# Patient Record
Sex: FEMALE | Race: WHITE | Employment: UNEMPLOYED | URBAN - METROPOLITAN AREA
[De-identification: names, ages, dates, MRNs, and addresses within clinical notes are randomized per-mention and may not be internally consistent; named-entity substitution may affect disease eponyms.]

---

## 2017-01-03 ENCOUNTER — TELEPHONE (OUTPATIENT)
Dept: CARDIOLOGY CLINIC | Age: 42
End: 2017-01-03

## 2017-01-12 ENCOUNTER — HOSPITAL ENCOUNTER (OUTPATIENT)
Dept: CARDIOLOGY | Facility: CLINIC | Age: 42
Discharge: HOME | End: 2017-01-12
Attending: INTERNAL MEDICINE | Admitting: INTERNAL MEDICINE

## 2017-01-12 LAB
LV EF: 55 %
LVEF MODALITY: NORMAL

## 2017-01-13 ENCOUNTER — TELEPHONE (OUTPATIENT)
Dept: CARDIOLOGY CLINIC | Age: 42
End: 2017-01-13

## 2017-01-16 ENCOUNTER — OFFICE VISIT (OUTPATIENT)
Dept: FAMILY MEDICINE CLINIC | Age: 42
End: 2017-01-16

## 2017-01-16 VITALS
WEIGHT: 239.2 LBS | HEIGHT: 65 IN | TEMPERATURE: 99.2 F | SYSTOLIC BLOOD PRESSURE: 122 MMHG | OXYGEN SATURATION: 97 % | BODY MASS INDEX: 39.85 KG/M2 | DIASTOLIC BLOOD PRESSURE: 80 MMHG | HEART RATE: 80 BPM

## 2017-01-16 DIAGNOSIS — M54.6 ACUTE MIDLINE THORACIC BACK PAIN: ICD-10-CM

## 2017-01-16 DIAGNOSIS — Z01.818 PREOPERATIVE CLEARANCE: Primary | ICD-10-CM

## 2017-01-16 DIAGNOSIS — E66.01 MORBID OBESITY WITH BMI OF 40.0-44.9, ADULT (HCC): ICD-10-CM

## 2017-01-16 PROCEDURE — 99244 OFF/OP CNSLTJ NEW/EST MOD 40: CPT | Performed by: FAMILY MEDICINE

## 2017-01-16 RX ORDER — VORTIOXETINE 10 MG/1
TABLET, FILM COATED ORAL
Refills: 0 | COMMUNITY
Start: 2016-12-02 | End: 2017-05-12 | Stop reason: ALTCHOICE

## 2017-01-16 ASSESSMENT — ENCOUNTER SYMPTOMS
VISUAL CHANGE: 0
SWOLLEN GLANDS: 0
SORE THROAT: 0
ABDOMINAL PAIN: 0

## 2017-01-17 RX ORDER — ALPRAZOLAM 1 MG/1
TABLET ORAL
Qty: 120 TABLET | Refills: 0 | Status: SHIPPED | OUTPATIENT
Start: 2017-01-17 | End: 2017-02-24 | Stop reason: SDUPTHER

## 2017-01-18 RX ORDER — DICYCLOMINE HCL 20 MG
TABLET ORAL
Qty: 180 TABLET | Refills: 3 | Status: SHIPPED | OUTPATIENT
Start: 2017-01-18

## 2017-01-18 RX ORDER — RABEPRAZOLE SODIUM 20 MG/1
TABLET, DELAYED RELEASE ORAL
Qty: 60 TABLET | Refills: 3 | Status: SHIPPED | OUTPATIENT
Start: 2017-01-18 | End: 2018-12-16 | Stop reason: SDUPTHER

## 2017-01-18 RX ORDER — HYDROCHLOROTHIAZIDE 25 MG/1
TABLET ORAL
Qty: 30 TABLET | Refills: 3 | Status: SHIPPED | OUTPATIENT
Start: 2017-01-18 | End: 2018-07-16

## 2017-01-19 ENCOUNTER — TELEPHONE (OUTPATIENT)
Dept: ORTHOPEDIC SURGERY | Age: 42
End: 2017-01-19

## 2017-01-20 ENCOUNTER — HOSPITAL ENCOUNTER (OUTPATIENT)
Dept: OTHER | Age: 42
Discharge: HOME | End: 2017-01-20
Attending: FAMILY MEDICINE | Admitting: FAMILY MEDICINE

## 2017-01-20 DIAGNOSIS — M54.6 ACUTE MIDLINE THORACIC BACK PAIN: ICD-10-CM

## 2017-01-30 ENCOUNTER — HOSPITAL ENCOUNTER (OUTPATIENT)
Dept: SURGERY | Age: 42
Discharge: HOME | End: 2017-01-30
Attending: ORTHOPAEDIC SURGERY | Admitting: ORTHOPAEDIC SURGERY

## 2017-01-30 VITALS
WEIGHT: 239 LBS | RESPIRATION RATE: 20 BRPM | BODY MASS INDEX: 39.82 KG/M2 | TEMPERATURE: 97.8 F | OXYGEN SATURATION: 96 % | HEIGHT: 65 IN | HEART RATE: 66 BPM | SYSTOLIC BLOOD PRESSURE: 119 MMHG | DIASTOLIC BLOOD PRESSURE: 58 MMHG

## 2017-01-30 RX ORDER — MORPHINE SULFATE 10 MG/ML
2 INJECTION, SOLUTION INTRAMUSCULAR; INTRAVENOUS EVERY 5 MIN PRN
Status: DISCONTINUED | OUTPATIENT
Start: 2017-01-30 | End: 2017-01-31 | Stop reason: HOSPADM

## 2017-01-30 RX ORDER — LIDOCAINE HYDROCHLORIDE 10 MG/ML
1 INJECTION, SOLUTION EPIDURAL; INFILTRATION; INTRACAUDAL; PERINEURAL
Status: ACTIVE | OUTPATIENT
Start: 2017-01-30 | End: 2017-01-30

## 2017-01-30 RX ORDER — OXYCODONE HYDROCHLORIDE AND ACETAMINOPHEN 5; 325 MG/1; MG/1
2 TABLET ORAL PRN
Status: ACTIVE | OUTPATIENT
Start: 2017-01-30 | End: 2017-01-30

## 2017-01-30 RX ORDER — OXYCODONE HYDROCHLORIDE AND ACETAMINOPHEN 5; 325 MG/1; MG/1
1 TABLET ORAL PRN
Status: ACTIVE | OUTPATIENT
Start: 2017-01-30 | End: 2017-01-30

## 2017-01-30 RX ORDER — LABETALOL HYDROCHLORIDE 5 MG/ML
5 INJECTION, SOLUTION INTRAVENOUS EVERY 10 MIN PRN
Status: DISCONTINUED | OUTPATIENT
Start: 2017-01-30 | End: 2017-01-31 | Stop reason: HOSPADM

## 2017-01-30 RX ORDER — SODIUM CHLORIDE, SODIUM LACTATE, POTASSIUM CHLORIDE, CALCIUM CHLORIDE 600; 310; 30; 20 MG/100ML; MG/100ML; MG/100ML; MG/100ML
INJECTION, SOLUTION INTRAVENOUS CONTINUOUS
Status: DISCONTINUED | OUTPATIENT
Start: 2017-01-30 | End: 2017-01-31 | Stop reason: HOSPADM

## 2017-01-30 RX ORDER — ONDANSETRON 2 MG/ML
4 INJECTION INTRAMUSCULAR; INTRAVENOUS
Status: ACTIVE | OUTPATIENT
Start: 2017-01-30 | End: 2017-01-30

## 2017-01-30 RX ORDER — SODIUM CHLORIDE 0.9 % (FLUSH) 0.9 %
10 SYRINGE (ML) INJECTION PRN
Status: DISCONTINUED | OUTPATIENT
Start: 2017-01-30 | End: 2017-01-31 | Stop reason: HOSPADM

## 2017-01-30 RX ORDER — MEPERIDINE HYDROCHLORIDE 50 MG/ML
12.5 INJECTION INTRAMUSCULAR; INTRAVENOUS; SUBCUTANEOUS EVERY 5 MIN PRN
Status: DISCONTINUED | OUTPATIENT
Start: 2017-01-30 | End: 2017-01-31 | Stop reason: HOSPADM

## 2017-01-30 RX ORDER — HYDRALAZINE HYDROCHLORIDE 20 MG/ML
5 INJECTION INTRAMUSCULAR; INTRAVENOUS
Status: DISCONTINUED | OUTPATIENT
Start: 2017-01-30 | End: 2017-01-31 | Stop reason: HOSPADM

## 2017-01-30 RX ORDER — MORPHINE SULFATE 2 MG/ML
1 INJECTION, SOLUTION INTRAMUSCULAR; INTRAVENOUS EVERY 5 MIN PRN
Status: DISCONTINUED | OUTPATIENT
Start: 2017-01-30 | End: 2017-01-31 | Stop reason: HOSPADM

## 2017-01-30 RX ORDER — CEPHALEXIN 500 MG/1
500 CAPSULE ORAL 4 TIMES DAILY
Qty: 8 CAPSULE | Refills: 0 | Status: SHIPPED | OUTPATIENT
Start: 2017-01-30 | End: 2017-05-12 | Stop reason: ALTCHOICE

## 2017-01-30 RX ORDER — SODIUM CHLORIDE 0.9 % (FLUSH) 0.9 %
10 SYRINGE (ML) INJECTION EVERY 12 HOURS SCHEDULED
Status: DISCONTINUED | OUTPATIENT
Start: 2017-01-30 | End: 2017-01-31 | Stop reason: HOSPADM

## 2017-01-30 RX ADMIN — SODIUM CHLORIDE, SODIUM LACTATE, POTASSIUM CHLORIDE, CALCIUM CHLORIDE: 600; 310; 30; 20 INJECTION, SOLUTION INTRAVENOUS at 06:52

## 2017-01-30 RX ADMIN — Medication 0.5 MG: at 08:20

## 2017-01-30 RX ADMIN — Medication 0.5 MG: at 08:28

## 2017-01-30 ASSESSMENT — PAIN SCALES - GENERAL
PAINLEVEL_OUTOF10: 3
PAINLEVEL_OUTOF10: 7
PAINLEVEL_OUTOF10: 5
PAINLEVEL_OUTOF10: 6
PAINLEVEL_OUTOF10: 8

## 2017-01-30 ASSESSMENT — PAIN - FUNCTIONAL ASSESSMENT: PAIN_FUNCTIONAL_ASSESSMENT: 0-10

## 2017-01-30 ASSESSMENT — PAIN DESCRIPTION - DESCRIPTORS: DESCRIPTORS: CONSTANT

## 2017-01-31 ENCOUNTER — TELEPHONE (OUTPATIENT)
Dept: ORTHOPEDIC SURGERY | Age: 42
End: 2017-01-31

## 2017-02-01 ENCOUNTER — TELEPHONE (OUTPATIENT)
Dept: ORTHOPEDIC SURGERY | Age: 42
End: 2017-02-01

## 2017-02-03 ENCOUNTER — TELEPHONE (OUTPATIENT)
Dept: BARIATRICS/WEIGHT MGMT | Age: 42
End: 2017-02-03

## 2017-02-10 ENCOUNTER — OFFICE VISIT (OUTPATIENT)
Dept: ORTHOPEDIC SURGERY | Age: 42
End: 2017-02-10

## 2017-02-10 VITALS
DIASTOLIC BLOOD PRESSURE: 73 MMHG | WEIGHT: 239 LBS | HEART RATE: 99 BPM | SYSTOLIC BLOOD PRESSURE: 107 MMHG | HEIGHT: 65 IN | BODY MASS INDEX: 39.82 KG/M2

## 2017-02-10 DIAGNOSIS — G57.61 MORTON'S NEUROMA OF RIGHT FOOT: Primary | ICD-10-CM

## 2017-02-10 PROCEDURE — 99024 POSTOP FOLLOW-UP VISIT: CPT | Performed by: ORTHOPAEDIC SURGERY

## 2017-02-13 DIAGNOSIS — E55.9 VITAMIN D DEFICIENCY: Primary | ICD-10-CM

## 2017-02-13 RX ORDER — DULOXETIN HYDROCHLORIDE 60 MG/1
CAPSULE, DELAYED RELEASE ORAL
Qty: 60 CAPSULE | Refills: 0 | Status: SHIPPED | OUTPATIENT
Start: 2017-02-13 | End: 2019-12-05

## 2017-02-17 ENCOUNTER — TELEPHONE (OUTPATIENT)
Dept: ORTHOPEDIC SURGERY | Age: 42
End: 2017-02-17

## 2017-02-24 ENCOUNTER — OFFICE VISIT (OUTPATIENT)
Dept: ORTHOPEDIC SURGERY | Age: 42
End: 2017-02-24

## 2017-02-24 VITALS
BODY MASS INDEX: 39.82 KG/M2 | HEIGHT: 65 IN | HEART RATE: 71 BPM | WEIGHT: 239 LBS | SYSTOLIC BLOOD PRESSURE: 114 MMHG | DIASTOLIC BLOOD PRESSURE: 72 MMHG

## 2017-02-24 DIAGNOSIS — G57.61 MORTON'S NEUROMA OF RIGHT FOOT: Primary | ICD-10-CM

## 2017-02-24 PROCEDURE — 99024 POSTOP FOLLOW-UP VISIT: CPT | Performed by: ORTHOPAEDIC SURGERY

## 2017-03-14 ENCOUNTER — OFFICE VISIT (OUTPATIENT)
Dept: ORTHOPEDIC SURGERY | Age: 42
End: 2017-03-14

## 2017-03-14 VITALS
SYSTOLIC BLOOD PRESSURE: 108 MMHG | WEIGHT: 238.98 LBS | HEIGHT: 65 IN | DIASTOLIC BLOOD PRESSURE: 69 MMHG | HEART RATE: 102 BPM | BODY MASS INDEX: 39.82 KG/M2

## 2017-03-14 DIAGNOSIS — G57.61 MORTON'S NEUROMA OF RIGHT FOOT: Primary | ICD-10-CM

## 2017-03-14 PROCEDURE — 99024 POSTOP FOLLOW-UP VISIT: CPT | Performed by: ORTHOPAEDIC SURGERY

## 2017-03-28 ENCOUNTER — TELEPHONE (OUTPATIENT)
Dept: BARIATRICS/WEIGHT MGMT | Age: 42
End: 2017-03-28

## 2017-04-05 ENCOUNTER — HOSPITAL ENCOUNTER (OUTPATIENT)
Dept: OTHER | Age: 42
Discharge: HOME | End: 2017-04-05
Attending: PHYSICIAN ASSISTANT | Admitting: PHYSICIAN ASSISTANT

## 2017-04-05 DIAGNOSIS — R19.7 DIARRHEA, UNSPECIFIED TYPE: ICD-10-CM

## 2017-04-05 LAB — C DIFFICILE TOXIN, EIA: ABNORMAL

## 2017-04-06 ENCOUNTER — TELEPHONE (OUTPATIENT)
Dept: FAMILY MEDICINE CLINIC | Age: 42
End: 2017-04-06

## 2017-04-06 DIAGNOSIS — A04.72 C. DIFFICILE COLITIS: Primary | ICD-10-CM

## 2017-04-06 LAB — GI BACTERIAL PATHOGENS BY PCR: NORMAL

## 2017-04-25 ENCOUNTER — OFFICE VISIT (OUTPATIENT)
Dept: ORTHOPEDIC SURGERY | Age: 42
End: 2017-04-25

## 2017-04-25 ENCOUNTER — TELEPHONE (OUTPATIENT)
Dept: BARIATRICS/WEIGHT MGMT | Age: 42
End: 2017-04-25

## 2017-04-25 VITALS
BODY MASS INDEX: 39.82 KG/M2 | SYSTOLIC BLOOD PRESSURE: 96 MMHG | HEIGHT: 65 IN | HEART RATE: 71 BPM | WEIGHT: 238.98 LBS | DIASTOLIC BLOOD PRESSURE: 65 MMHG

## 2017-04-25 DIAGNOSIS — G57.61 MORTON'S NEUROMA OF RIGHT FOOT: Primary | ICD-10-CM

## 2017-04-25 PROCEDURE — 99024 POSTOP FOLLOW-UP VISIT: CPT | Performed by: ORTHOPAEDIC SURGERY

## 2017-05-09 ENCOUNTER — OFFICE VISIT (OUTPATIENT)
Dept: PULMONOLOGY | Age: 42
End: 2017-05-09

## 2017-05-09 VITALS
TEMPERATURE: 97.4 F | SYSTOLIC BLOOD PRESSURE: 97 MMHG | DIASTOLIC BLOOD PRESSURE: 64 MMHG | RESPIRATION RATE: 16 BRPM | HEIGHT: 65 IN | BODY MASS INDEX: 40.35 KG/M2 | OXYGEN SATURATION: 97 % | HEART RATE: 64 BPM | WEIGHT: 242.2 LBS

## 2017-05-09 DIAGNOSIS — R06.83 SNORING: ICD-10-CM

## 2017-05-09 DIAGNOSIS — F17.200 CURRENT SMOKER: ICD-10-CM

## 2017-05-09 DIAGNOSIS — Z01.818 PRE-OPERATIVE CLEARANCE: ICD-10-CM

## 2017-05-09 DIAGNOSIS — J44.9 MODERATE COPD (CHRONIC OBSTRUCTIVE PULMONARY DISEASE) (HCC): ICD-10-CM

## 2017-05-09 DIAGNOSIS — E66.01 MORBID OBESITY, UNSPECIFIED OBESITY TYPE (HCC): ICD-10-CM

## 2017-05-09 DIAGNOSIS — R53.83 OTHER FATIGUE: Primary | ICD-10-CM

## 2017-05-09 PROCEDURE — 99215 OFFICE O/P EST HI 40 MIN: CPT | Performed by: INTERNAL MEDICINE

## 2017-05-09 RX ORDER — ALBUTEROL SULFATE 90 UG/1
2 AEROSOL, METERED RESPIRATORY (INHALATION) EVERY 6 HOURS PRN
Qty: 1 INHALER | Refills: 6 | Status: SHIPPED | OUTPATIENT
Start: 2017-05-09 | End: 2018-07-13 | Stop reason: SDUPTHER

## 2017-05-09 ASSESSMENT — SLEEP AND FATIGUE QUESTIONNAIRES
HOW LIKELY ARE YOU TO NOD OFF OR FALL ASLEEP WHILE SITTING AND TALKING TO SOMEONE: 0
HOW LIKELY ARE YOU TO NOD OFF OR FALL ASLEEP WHEN YOU ARE A PASSENGER IN A CAR FOR AN HOUR WITHOUT A BREAK: 0
NECK CIRCUMFERENCE (INCHES): 14.75
HOW LIKELY ARE YOU TO NOD OFF OR FALL ASLEEP IN A CAR, WHILE STOPPED FOR A FEW MINUTES IN TRAFFIC: 0
HOW LIKELY ARE YOU TO NOD OFF OR FALL ASLEEP WHILE WATCHING TV: 3
HOW LIKELY ARE YOU TO NOD OFF OR FALL ASLEEP WHILE SITTING INACTIVE IN A PUBLIC PLACE: 2
HOW LIKELY ARE YOU TO NOD OFF OR FALL ASLEEP WHILE LYING DOWN TO REST IN THE AFTERNOON WHEN CIRCUMSTANCES PERMIT: 3
HOW LIKELY ARE YOU TO NOD OFF OR FALL ASLEEP WHILE SITTING QUIETLY AFTER LUNCH WITHOUT ALCOHOL: 0
HOW LIKELY ARE YOU TO NOD OFF OR FALL ASLEEP WHILE SITTING AND READING: 0
ESS TOTAL SCORE: 8

## 2017-05-12 ENCOUNTER — HOSPITAL ENCOUNTER (OUTPATIENT)
Dept: ENDOSCOPY | Age: 42
Discharge: HOME | End: 2017-05-12
Attending: SURGERY | Admitting: SURGERY

## 2017-05-12 VITALS
RESPIRATION RATE: 16 BRPM | OXYGEN SATURATION: 97 % | TEMPERATURE: 98 F | WEIGHT: 238.06 LBS | BODY MASS INDEX: 38.26 KG/M2 | HEIGHT: 66 IN | DIASTOLIC BLOOD PRESSURE: 72 MMHG | HEART RATE: 61 BPM | SYSTOLIC BLOOD PRESSURE: 122 MMHG

## 2017-05-12 DIAGNOSIS — K44.9 HIATAL HERNIA: ICD-10-CM

## 2017-05-12 PROCEDURE — 43239 EGD BIOPSY SINGLE/MULTIPLE: CPT | Performed by: SURGERY

## 2017-05-12 RX ORDER — SODIUM CHLORIDE 9 MG/ML
INJECTION, SOLUTION INTRAVENOUS CONTINUOUS
Status: DISCONTINUED | OUTPATIENT
Start: 2017-05-12 | End: 2017-05-13 | Stop reason: HOSPADM

## 2017-05-12 RX ORDER — LIDOCAINE HYDROCHLORIDE 10 MG/ML
1 INJECTION, SOLUTION EPIDURAL; INFILTRATION; INTRACAUDAL; PERINEURAL
Status: ACTIVE | OUTPATIENT
Start: 2017-05-12 | End: 2017-05-12

## 2017-05-12 RX ADMIN — SODIUM CHLORIDE: 9 INJECTION, SOLUTION INTRAVENOUS at 11:09

## 2017-05-12 ASSESSMENT — PAIN - FUNCTIONAL ASSESSMENT: PAIN_FUNCTIONAL_ASSESSMENT: 0-10

## 2017-05-15 ENCOUNTER — HOSPITAL ENCOUNTER (OUTPATIENT)
Dept: PULMONOLOGY | Age: 42
Discharge: HOME | End: 2017-05-15
Attending: INTERNAL MEDICINE | Admitting: INTERNAL MEDICINE

## 2017-05-15 DIAGNOSIS — Z01.818 PRE-OPERATIVE CLEARANCE: ICD-10-CM

## 2017-05-15 DIAGNOSIS — J44.9 CHRONIC OBSTRUCTIVE PULMONARY DISEASE (HCC): ICD-10-CM

## 2017-05-15 DIAGNOSIS — J44.9 CHRONIC OBSTRUCTIVE PULMONARY DISEASE, UNSPECIFIED COPD TYPE (HCC): ICD-10-CM

## 2017-05-15 RX ORDER — ALBUTEROL SULFATE 90 UG/1
6 AEROSOL, METERED RESPIRATORY (INHALATION) ONCE
Status: COMPLETED | OUTPATIENT
Start: 2017-05-15 | End: 2017-05-15

## 2017-05-15 RX ORDER — ALBUTEROL SULFATE 90 UG/1
2 AEROSOL, METERED RESPIRATORY (INHALATION) EVERY 6 HOURS PRN
Status: DISCONTINUED | OUTPATIENT
Start: 2017-05-15 | End: 2017-05-15

## 2017-05-15 RX ADMIN — ALBUTEROL SULFATE 6 PUFF: 90 AEROSOL, METERED RESPIRATORY (INHALATION) at 15:27

## 2017-05-16 ENCOUNTER — TELEPHONE (OUTPATIENT)
Dept: BARIATRICS/WEIGHT MGMT | Age: 42
End: 2017-05-16

## 2017-05-16 PROBLEM — K85.90 ACUTE PANCREATITIS: Status: ACTIVE | Noted: 2017-05-16

## 2017-05-23 ENCOUNTER — OFFICE VISIT (OUTPATIENT)
Dept: BARIATRICS/WEIGHT MGMT | Age: 42
End: 2017-05-23

## 2017-05-23 VITALS
SYSTOLIC BLOOD PRESSURE: 104 MMHG | DIASTOLIC BLOOD PRESSURE: 65 MMHG | BODY MASS INDEX: 40.15 KG/M2 | HEART RATE: 78 BPM | WEIGHT: 241 LBS | HEIGHT: 65 IN | RESPIRATION RATE: 15 BRPM

## 2017-05-23 DIAGNOSIS — K21.9 CHRONIC GERD: ICD-10-CM

## 2017-05-23 DIAGNOSIS — E66.01 MORBID OBESITY WITH BMI OF 40.0-44.9, ADULT (HCC): Primary | ICD-10-CM

## 2017-05-23 DIAGNOSIS — K44.9 HIATAL HERNIA: ICD-10-CM

## 2017-05-23 PROCEDURE — 99213 OFFICE O/P EST LOW 20 MIN: CPT | Performed by: SURGERY

## 2017-05-23 RX ORDER — HYDROCODONE BITARTRATE AND ACETAMINOPHEN 5; 325 MG/1; MG/1
1 TABLET ORAL EVERY 6 HOURS PRN
COMMUNITY
End: 2018-10-01 | Stop reason: ALTCHOICE

## 2017-06-02 ENCOUNTER — HOSPITAL ENCOUNTER (OUTPATIENT)
Dept: SLEEP MEDICINE | Age: 42
Discharge: HOME | End: 2017-06-03
Attending: INTERNAL MEDICINE | Admitting: INTERNAL MEDICINE

## 2017-06-02 DIAGNOSIS — R06.83 SNORING: ICD-10-CM

## 2017-06-02 DIAGNOSIS — R53.83 OTHER FATIGUE: ICD-10-CM

## 2017-06-06 ENCOUNTER — OFFICE VISIT (OUTPATIENT)
Dept: ORTHOPEDIC SURGERY | Age: 42
End: 2017-06-06

## 2017-06-06 VITALS
SYSTOLIC BLOOD PRESSURE: 121 MMHG | DIASTOLIC BLOOD PRESSURE: 75 MMHG | WEIGHT: 234.35 LBS | BODY MASS INDEX: 39.04 KG/M2 | HEIGHT: 65 IN | HEART RATE: 75 BPM

## 2017-06-06 DIAGNOSIS — G57.61 MORTON'S NEUROMA OF RIGHT FOOT: ICD-10-CM

## 2017-06-06 DIAGNOSIS — M79.671 FOOT PAIN, RIGHT: Primary | ICD-10-CM

## 2017-06-06 PROCEDURE — 73630 X-RAY EXAM OF FOOT: CPT | Performed by: ORTHOPAEDIC SURGERY

## 2017-06-06 PROCEDURE — 99212 OFFICE O/P EST SF 10 MIN: CPT | Performed by: ORTHOPAEDIC SURGERY

## 2017-06-06 RX ORDER — DEXAMETHASONE SODIUM PHOSPHATE 4 MG/ML
4 INJECTION, SOLUTION INTRA-ARTICULAR; INTRALESIONAL; INTRAMUSCULAR; INTRAVENOUS; SOFT TISSUE SEE ADMIN INSTRUCTIONS
Qty: 30 ML | Refills: 0 | Status: SHIPPED | OUTPATIENT
Start: 2017-06-06 | End: 2017-08-15 | Stop reason: ALTCHOICE

## 2017-06-13 ENCOUNTER — HOSPITAL ENCOUNTER (OUTPATIENT)
Dept: PHYSICAL THERAPY | Age: 42
Discharge: HOME | End: 2017-06-30
Admitting: ORTHOPAEDIC SURGERY

## 2017-06-16 ENCOUNTER — HOSPITAL ENCOUNTER (OUTPATIENT)
Dept: PHYSICAL THERAPY | Age: 42
Discharge: HOME | End: 2017-06-16
Admitting: ORTHOPAEDIC SURGERY

## 2017-06-19 ENCOUNTER — HOSPITAL ENCOUNTER (OUTPATIENT)
Dept: PHYSICAL THERAPY | Age: 42
Discharge: HOME | End: 2017-06-19
Admitting: ORTHOPAEDIC SURGERY

## 2017-06-21 ENCOUNTER — HOSPITAL ENCOUNTER (OUTPATIENT)
Dept: PHYSICAL THERAPY | Age: 42
Discharge: HOME | End: 2017-06-21
Admitting: ORTHOPAEDIC SURGERY

## 2017-06-23 ENCOUNTER — HOSPITAL ENCOUNTER (OUTPATIENT)
Dept: PHYSICAL THERAPY | Age: 42
Discharge: HOME | End: 2017-06-23
Admitting: ORTHOPAEDIC SURGERY

## 2017-06-30 ENCOUNTER — HOSPITAL ENCOUNTER (OUTPATIENT)
Dept: PHYSICAL THERAPY | Age: 42
Discharge: HOME | End: 2017-06-30
Admitting: ORTHOPAEDIC SURGERY

## 2017-07-07 ENCOUNTER — OFFICE VISIT (OUTPATIENT)
Dept: ORTHOPEDIC SURGERY | Age: 42
End: 2017-07-07

## 2017-07-07 DIAGNOSIS — G57.81 NEUROMA DIGITAL NERVE, RIGHT: Primary | ICD-10-CM

## 2017-07-07 PROCEDURE — 99212 OFFICE O/P EST SF 10 MIN: CPT | Performed by: ORTHOPAEDIC SURGERY

## 2017-07-24 ENCOUNTER — OFFICE VISIT (OUTPATIENT)
Dept: BARIATRICS/WEIGHT MGMT | Age: 42
End: 2017-07-24

## 2017-07-24 VITALS
HEART RATE: 70 BPM | BODY MASS INDEX: 39.22 KG/M2 | WEIGHT: 235.4 LBS | HEIGHT: 65 IN | DIASTOLIC BLOOD PRESSURE: 78 MMHG | SYSTOLIC BLOOD PRESSURE: 112 MMHG

## 2017-07-24 DIAGNOSIS — K21.9 CHRONIC GERD: Primary | ICD-10-CM

## 2017-07-24 DIAGNOSIS — E66.9 OBESITY (BMI 30-39.9): ICD-10-CM

## 2017-07-24 DIAGNOSIS — R06.83 SNORING: ICD-10-CM

## 2017-07-24 PROCEDURE — 99213 OFFICE O/P EST LOW 20 MIN: CPT | Performed by: NURSE PRACTITIONER

## 2017-07-24 RX ORDER — VILAZODONE HYDROCHLORIDE 40 MG/1
TABLET ORAL
Refills: 0 | COMMUNITY
Start: 2017-07-18 | End: 2019-03-07

## 2017-07-24 ASSESSMENT — ENCOUNTER SYMPTOMS
BACK PAIN: 1
GASTROINTESTINAL NEGATIVE: 1
ALLERGIC/IMMUNOLOGIC NEGATIVE: 1
RESPIRATORY NEGATIVE: 1
EYES NEGATIVE: 1

## 2017-07-26 ENCOUNTER — OFFICE VISIT (OUTPATIENT)
Dept: ORTHOPEDIC SURGERY | Age: 42
End: 2017-07-26

## 2017-07-26 VITALS
DIASTOLIC BLOOD PRESSURE: 62 MMHG | HEIGHT: 65 IN | BODY MASS INDEX: 39.23 KG/M2 | WEIGHT: 235.45 LBS | HEART RATE: 67 BPM | SYSTOLIC BLOOD PRESSURE: 110 MMHG

## 2017-07-26 DIAGNOSIS — M25.562 ACUTE PAIN OF BOTH KNEES: Primary | ICD-10-CM

## 2017-07-26 DIAGNOSIS — M22.8X2 PATELLAR MALTRACKING, LEFT: ICD-10-CM

## 2017-07-26 DIAGNOSIS — M22.42 CHONDROMALACIA OF PATELLOFEMORAL JOINT, LEFT: ICD-10-CM

## 2017-07-26 DIAGNOSIS — M22.41 CHONDROMALACIA OF PATELLOFEMORAL JOINT, RIGHT: ICD-10-CM

## 2017-07-26 DIAGNOSIS — M22.8X1 PATELLAR MALTRACKING, RIGHT: ICD-10-CM

## 2017-07-26 DIAGNOSIS — M25.561 ACUTE PAIN OF BOTH KNEES: Primary | ICD-10-CM

## 2017-07-26 PROBLEM — M22.8X9 PATELLAR MALTRACKING: Status: ACTIVE | Noted: 2017-07-26

## 2017-07-26 PROBLEM — M22.40 CHONDROMALACIA OF PATELLOFEMORAL JOINT: Status: ACTIVE | Noted: 2017-07-26

## 2017-07-26 PROCEDURE — 20610 DRAIN/INJ JOINT/BURSA W/O US: CPT | Performed by: ORTHOPAEDIC SURGERY

## 2017-07-26 PROCEDURE — 99213 OFFICE O/P EST LOW 20 MIN: CPT | Performed by: ORTHOPAEDIC SURGERY

## 2017-07-26 PROCEDURE — 73562 X-RAY EXAM OF KNEE 3: CPT | Performed by: ORTHOPAEDIC SURGERY

## 2017-07-28 ENCOUNTER — HOSPITAL ENCOUNTER (OUTPATIENT)
Dept: MRI IMAGING | Age: 42
Discharge: HOME | End: 2017-07-28

## 2017-07-28 DIAGNOSIS — M54.16 LUMBAR RADICULOPATHY: ICD-10-CM

## 2017-07-28 DIAGNOSIS — M79.671 RIGHT FOOT PAIN: ICD-10-CM

## 2017-08-02 ENCOUNTER — HOSPITAL ENCOUNTER (OUTPATIENT)
Dept: PHYSICAL THERAPY | Age: 42
Discharge: HOME | End: 2017-07-31
Admitting: ORTHOPAEDIC SURGERY

## 2017-08-02 ENCOUNTER — HOSPITAL ENCOUNTER (OUTPATIENT)
Dept: PHYSICAL THERAPY | Age: 42
Discharge: HOME | End: 2017-08-02
Admitting: ORTHOPAEDIC SURGERY

## 2017-08-11 ENCOUNTER — HOSPITAL ENCOUNTER (OUTPATIENT)
Dept: PHYSICAL THERAPY | Age: 42
Discharge: HOME | End: 2017-08-11
Admitting: ORTHOPAEDIC SURGERY

## 2017-08-14 ENCOUNTER — HOSPITAL ENCOUNTER (OUTPATIENT)
Dept: PHYSICAL THERAPY | Age: 42
Discharge: HOME | End: 2017-08-14
Admitting: ORTHOPAEDIC SURGERY

## 2017-08-15 ENCOUNTER — OFFICE VISIT (OUTPATIENT)
Dept: PULMONOLOGY | Age: 42
End: 2017-08-15

## 2017-08-15 VITALS
OXYGEN SATURATION: 98 % | DIASTOLIC BLOOD PRESSURE: 72 MMHG | HEIGHT: 65 IN | HEART RATE: 76 BPM | WEIGHT: 234 LBS | BODY MASS INDEX: 38.99 KG/M2 | SYSTOLIC BLOOD PRESSURE: 106 MMHG

## 2017-08-15 DIAGNOSIS — Z01.818 PRE-OPERATIVE CLEARANCE: ICD-10-CM

## 2017-08-15 DIAGNOSIS — J44.9 MODERATE COPD (CHRONIC OBSTRUCTIVE PULMONARY DISEASE) (HCC): ICD-10-CM

## 2017-08-15 DIAGNOSIS — F17.200 CURRENT SMOKER: ICD-10-CM

## 2017-08-15 DIAGNOSIS — R53.83 FATIGUE, UNSPECIFIED TYPE: Primary | ICD-10-CM

## 2017-08-15 DIAGNOSIS — R06.83 SNORING: ICD-10-CM

## 2017-08-15 PROCEDURE — 99214 OFFICE O/P EST MOD 30 MIN: CPT | Performed by: INTERNAL MEDICINE

## 2017-08-15 RX ORDER — ZALEPLON 10 MG/1
10 CAPSULE ORAL ONCE
Qty: 1 CAPSULE | Refills: 0 | Status: SHIPPED | OUTPATIENT
Start: 2017-08-15 | End: 2017-08-15

## 2017-08-15 RX ORDER — NICOTINE 21 MG/24HR
1 PATCH, TRANSDERMAL 24 HOURS TRANSDERMAL EVERY 24 HOURS
Qty: 42 PATCH | Refills: 0 | Status: ON HOLD | OUTPATIENT
Start: 2017-08-15 | End: 2017-12-31 | Stop reason: HOSPADM

## 2017-08-15 RX ORDER — NICOTINE 21 MG/24HR
1 PATCH, TRANSDERMAL 24 HOURS TRANSDERMAL EVERY 24 HOURS
Qty: 14 PATCH | Refills: 0 | Status: SHIPPED | OUTPATIENT
Start: 2017-08-15 | End: 2018-06-05 | Stop reason: ALTCHOICE

## 2017-08-15 ASSESSMENT — SLEEP AND FATIGUE QUESTIONNAIRES
HOW LIKELY ARE YOU TO NOD OFF OR FALL ASLEEP WHILE SITTING QUIETLY AFTER LUNCH WITHOUT ALCOHOL: 3
ESS TOTAL SCORE: 13
HOW LIKELY ARE YOU TO NOD OFF OR FALL ASLEEP IN A CAR, WHILE STOPPED FOR A FEW MINUTES IN TRAFFIC: 0
HOW LIKELY ARE YOU TO NOD OFF OR FALL ASLEEP WHILE SITTING INACTIVE IN A PUBLIC PLACE: 0
NECK CIRCUMFERENCE (INCHES): 15
HOW LIKELY ARE YOU TO NOD OFF OR FALL ASLEEP WHILE WATCHING TV: 1
HOW LIKELY ARE YOU TO NOD OFF OR FALL ASLEEP WHEN YOU ARE A PASSENGER IN A CAR FOR AN HOUR WITHOUT A BREAK: 3
HOW LIKELY ARE YOU TO NOD OFF OR FALL ASLEEP WHILE LYING DOWN TO REST IN THE AFTERNOON WHEN CIRCUMSTANCES PERMIT: 3
HOW LIKELY ARE YOU TO NOD OFF OR FALL ASLEEP WHILE SITTING AND READING: 3
HOW LIKELY ARE YOU TO NOD OFF OR FALL ASLEEP WHILE SITTING AND TALKING TO SOMEONE: 0

## 2017-08-17 ENCOUNTER — HOSPITAL ENCOUNTER (OUTPATIENT)
Dept: PHYSICAL THERAPY | Age: 42
Discharge: HOME | End: 2017-08-17
Admitting: ORTHOPAEDIC SURGERY

## 2017-08-24 ENCOUNTER — OFFICE VISIT (OUTPATIENT)
Dept: ORTHOPEDIC SURGERY | Age: 42
End: 2017-08-24

## 2017-08-24 VITALS
HEART RATE: 69 BPM | HEIGHT: 65 IN | DIASTOLIC BLOOD PRESSURE: 66 MMHG | BODY MASS INDEX: 38.97 KG/M2 | SYSTOLIC BLOOD PRESSURE: 102 MMHG | WEIGHT: 233.91 LBS

## 2017-08-24 DIAGNOSIS — M17.10 LOCALIZED OSTEOARTHROSIS, LOWER LEG: Primary | ICD-10-CM

## 2017-08-24 PROCEDURE — 99213 OFFICE O/P EST LOW 20 MIN: CPT | Performed by: ORTHOPAEDIC SURGERY

## 2017-08-28 ENCOUNTER — HOSPITAL ENCOUNTER (OUTPATIENT)
Dept: MAMMOGRAPHY | Age: 42
Discharge: HOME | End: 2017-08-28
Attending: FAMILY MEDICINE | Admitting: FAMILY MEDICINE

## 2017-08-28 DIAGNOSIS — Z12.31 ENCOUNTER FOR SCREENING MAMMOGRAM FOR BREAST CANCER: ICD-10-CM

## 2017-09-07 ENCOUNTER — HOSPITAL ENCOUNTER (OUTPATIENT)
Dept: PHYSICAL THERAPY | Age: 42
Discharge: HOME | End: 2017-09-30
Admitting: ORTHOPAEDIC SURGERY

## 2017-09-07 NOTE — PROGRESS NOTES
Physical Therapy  Initial Assessment  Date: 2017  Patient Name: Nicole Hart  MRN: [id number]  : 1975             Subjective   General  Chart Reviewed: Yes  Patient assessed for rehabilitation services?: Yes  Additional Pertinent Hx: obesity, FM, anxiety, neuroma with Sx in , 2016, LBP with B radiculopathy, latex allergy. Currently in PT at [facility] for R foot neuroma. Family / Caregiver Present: No  Referring Practitioner: Kadi Munoz MD  Referral Date : 17  Diagnosis: M17.10 localized OA, lower leg (B knee CMP)  General Comment  Comments: PLOF:  chronic B knee and R foot pain  PT Visit Information  Onset Date: 16  PT Insurance Information: [employer] -- 12 of 30 visits used this year, with 2 additional visits scheduled at [facility] next week  Subjective  Subjective: Pt c/o several years of B knee pain with exacerbation last November. Rates pain as 5/10 at best and 9/10 at worst.  States her standing tolerance is 20-30 mins. Not working but is looking into disability. Lives in a quad-level home and tries to avoid using stairs, but is able to amb with reciprocating gt. Had clinic-based PT until a week ago without change in pain.        Objective     Observation/Palpation  Palpation: tender to B patellar ligament and medial patella border    AROM RLE (degrees)  R Hip External Rotation 0-45: 20  R Hip Internal Rotation 0-45: 20  R Knee Flexion 0-145: WNL  R Knee Extension 0: 5  AROM LLE (degrees)  L Hip External Rotation 0-45: 20  L Hip Internal Rotation 0-45: 33  L Knee Flexion 0-145: WNL  L Knee Extension 0: 3    Strength RLE  R Hip Flexion: 4+/5  R Hip ABduction: 4+/5  R Hip Internal Rotation: 5/5  R Hip External Rotation: 5/5  R Knee Flexion: 5/5  R Knee Extension: 5/5  R Ankle Dorsiflexion: 4+/5  R Ankle Plantar flexion: 5/5  Strength LLE  L Hip Flexion: 4+/5  L Hip ABduction: 4+/5  L Hip Internal Rotation: 5/5  L Hip External Rotation: 5/5  L Knee Flexion: 5/5  L Knee Extension: 5/5  L Ankle Dorsiflexion: 5/5  L Ankle Plantar Flexion: 5/5  Strength Other  Other: PGM 4/5 B     Additional Measures  Special Tests: (-) Trell  Other: B genu valgus        Transfers  Sit to Stand: Independent  Stand to sit: Independent  Ambulation  Ambulation?: Yes  Ambulation 1  Device: No Device  Quality of Gait: normal stride length, no limp, no arm swing, femoral ambulator                            Assessment   Conditions Requiring Skilled Therapeutic Intervention  Body structures, Functions, Activity limitations: Decreased functional mobility ; Decreased ADL status; Decreased ROM; Decreased strength  Assessment: chronic B knee pain limiting ADLs, land-based PT without relief. Prognosis: Fair  Decision Making: Low Complexity  REQUIRES PT FOLLOW UP: Yes  Activity Tolerance  Activity Tolerance: Patient Tolerated treatment well         Plan   Plan  Times per week: 2x/wk x4 wks  Current Treatment Recommendations: Strengthening, Gait Training, Home Exercise Program  Plan Comment: aquatic PT    G-Code  PT G-Codes  Functional Assessment Tool Used: LEFS  Score: 12/80 (85% disability)  Functional Limitation: Mobility: Walking and moving around  Mobility: Walking and Moving Around Current Status (): At least 80 percent but less than 100 percent impaired, limited or restricted  Mobility: Walking and Moving Around Goal Status ():  At least 60 percent but less than 80 percent impaired, limited or restricted                                                     Goals  Short term goals  Time Frame for Short term goals: 2 wks  Short term goal 1: Pt will decrease pain by 50% in frequency or intensity  Short term goal 2: Pt will efren 30 mins of aquatic PT without increased pain  Long term goals  Time Frame for Long term goals : 4 wks  Long term goal 1: Pt will decrease pain by % in frequency or intensity  Long term goal 2: Pt will amb with normal gt pattern  Long term goal 3: Pt will increase knee ext to 0 degrees B  Long term goal 4: Pt will increase BLE strength to 5/5 grossly             Felizardo Blood, PT

## 2017-09-07 NOTE — FLOWSHEET NOTE
Goals:    Short term goals  Time Frame for Short term goals: 2 wks  Short term goal 1: Pt will decrease pain by 50% in frequency or intensity  Short term goal 2: Pt will efren 30 mins of aquatic PT without increased pain           Plan: [] Continue per plan of care [] Alter current plan (see comments)   [x] Plan of care initiated [] Hold pending MD visit [] Discharge      Plan for Next Session:  Aquatic PT    Re-Certification Due Date:         Signature:  Freida Fernandez PT

## 2017-09-07 NOTE — FLOWSHEET NOTE
initiated [] Hold pending MD visit [] Discharge    See Weekly Progress Note: [] Yes  [x] No  Next due:

## 2017-09-15 ENCOUNTER — HOSPITAL ENCOUNTER (OUTPATIENT)
Dept: OTHER | Age: 42
Discharge: HOME | End: 2017-09-16
Attending: INTERNAL MEDICINE | Admitting: INTERNAL MEDICINE

## 2017-09-21 ENCOUNTER — OFFICE VISIT (OUTPATIENT)
Dept: BARIATRICS/WEIGHT MGMT | Age: 42
End: 2017-09-21

## 2017-09-21 VITALS
SYSTOLIC BLOOD PRESSURE: 105 MMHG | HEART RATE: 69 BPM | WEIGHT: 239 LBS | RESPIRATION RATE: 16 BRPM | HEIGHT: 65 IN | DIASTOLIC BLOOD PRESSURE: 69 MMHG | BODY MASS INDEX: 39.82 KG/M2

## 2017-09-21 DIAGNOSIS — E66.01 MORBID OBESITY WITH BMI OF 40.0-44.9, ADULT (HCC): Primary | ICD-10-CM

## 2017-09-21 DIAGNOSIS — K44.9 HIATAL HERNIA: ICD-10-CM

## 2017-09-21 DIAGNOSIS — K21.9 CHRONIC GERD: ICD-10-CM

## 2017-09-21 PROCEDURE — 99213 OFFICE O/P EST LOW 20 MIN: CPT | Performed by: SURGERY

## 2017-10-06 ENCOUNTER — TELEPHONE (OUTPATIENT)
Dept: PULMONOLOGY | Age: 42
End: 2017-10-06

## 2017-10-06 DIAGNOSIS — G47.33 OSA (OBSTRUCTIVE SLEEP APNEA): Primary | ICD-10-CM

## 2017-10-26 ENCOUNTER — OFFICE VISIT (OUTPATIENT)
Dept: BARIATRICS/WEIGHT MGMT | Age: 42
End: 2017-10-26

## 2017-10-26 VITALS
BODY MASS INDEX: 40.48 KG/M2 | HEART RATE: 64 BPM | HEIGHT: 65 IN | DIASTOLIC BLOOD PRESSURE: 82 MMHG | WEIGHT: 243 LBS | SYSTOLIC BLOOD PRESSURE: 126 MMHG

## 2017-10-26 DIAGNOSIS — E66.01 MORBID OBESITY WITH BMI OF 40.0-44.9, ADULT (HCC): Primary | ICD-10-CM

## 2017-10-26 DIAGNOSIS — Z71.3 DIETARY COUNSELING AND SURVEILLANCE: ICD-10-CM

## 2017-10-26 PROCEDURE — G8417 CALC BMI ABV UP PARAM F/U: HCPCS | Performed by: FAMILY MEDICINE

## 2017-10-26 PROCEDURE — G8484 FLU IMMUNIZE NO ADMIN: HCPCS | Performed by: FAMILY MEDICINE

## 2017-10-26 PROCEDURE — 99214 OFFICE O/P EST MOD 30 MIN: CPT | Performed by: FAMILY MEDICINE

## 2017-10-26 PROCEDURE — 4004F PT TOBACCO SCREEN RCVD TLK: CPT | Performed by: FAMILY MEDICINE

## 2017-10-26 PROCEDURE — G8427 DOCREV CUR MEDS BY ELIG CLIN: HCPCS | Performed by: FAMILY MEDICINE

## 2017-10-26 RX ORDER — RANITIDINE 300 MG/1
300 TABLET ORAL DAILY
Refills: 3 | COMMUNITY
Start: 2017-10-20 | End: 2021-07-18 | Stop reason: ALTCHOICE

## 2017-10-26 RX ORDER — HYDROCODONE BITARTRATE 40 MG/1
1 TABLET, EXTENDED RELEASE ORAL DAILY
Refills: 0 | COMMUNITY
Start: 2017-09-26 | End: 2019-02-07

## 2017-10-26 NOTE — PROGRESS NOTES
Disorder Father     Depression Father     Other Father      PTSD    Cancer Father      Throat        Review of Systems   HENT: Negative. Eyes: Negative. Respiratory: Negative. Cardiovascular: Negative. Gastrointestinal: Negative. Endocrine: Negative. Musculoskeletal: Negative. Neurological: Negative. Psychiatric/Behavioral: Negative. Physical Exam   Constitutional: She is oriented to person, place, and time. She appears well-developed and well-nourished. Neck: Neck supple. No thyromegaly present. Cardiovascular: Normal rate, regular rhythm and normal heart sounds. Exam reveals no gallop and no friction rub. No murmur heard. Pulmonary/Chest: Effort normal and breath sounds normal. No respiratory distress. She has no wheezes. She has no rales. Abdominal: Soft. There is no tenderness. Musculoskeletal: She exhibits no edema. Lymphadenopathy:     She has no cervical adenopathy. Neurological: She is alert and oriented to person, place, and time. Skin: Skin is warm and dry. Psychiatric: She has a normal mood and affect.  Her behavior is normal.       Admission on 05/16/2017, Discharged on 05/18/2017   Component Date Value Ref Range Status    WBC 05/16/2017 8.2  4.0 - 11.0 K/uL Final    RBC 05/16/2017 4.84  4.00 - 5.20 M/uL Final    Hemoglobin 05/16/2017 12.4  12.0 - 16.0 g/dL Final    Hematocrit 05/16/2017 37.3  36.0 - 48.0 % Final    MCV 05/16/2017 77.0* 80.0 - 100.0 fL Final    MCH 05/16/2017 25.7* 26.0 - 34.0 pg Final    MCHC 05/16/2017 33.4  31.0 - 36.0 g/dL Final    RDW 05/16/2017 17.1* 12.4 - 15.4 % Final    Platelets 39/26/0216 318  135 - 450 K/uL Final    MPV 05/16/2017 8.7  5.0 - 10.5 fL Final    Neutrophils % 05/16/2017 64.9  % Final    Lymphocytes % 05/16/2017 28.6  % Final    Monocytes % 05/16/2017 4.2  % Final    Eosinophils % 05/16/2017 1.8  % Final    Basophils % 05/16/2017 0.5  % Final    Neutrophils # 05/16/2017 5.3  1.7 - 7.7 K/uL Final  Lymphocytes # 05/16/2017 2.3  1.0 - 5.1 K/uL Final    Monocytes # 05/16/2017 0.3  0.0 - 1.3 K/uL Final    Eosinophils # 05/16/2017 0.2  0.0 - 0.6 K/uL Final    Basophils # 05/16/2017 0.0  0.0 - 0.2 K/uL Final    Lipase 05/16/2017 169.0* 13.0 - 60.0 U/L Final    Sodium 05/16/2017 139  136 - 145 mmol/L Final    Potassium 05/16/2017 3.4* 3.5 - 5.1 mmol/L Final    Chloride 05/16/2017 102  99 - 110 mmol/L Final    CO2 05/16/2017 20* 21 - 32 mmol/L Final    Anion Gap 05/16/2017 17* 3 - 16 Final    Glucose 05/16/2017 107* 70 - 99 mg/dL Final    BUN 05/16/2017 7  7 - 20 mg/dL Final    CREATININE 05/16/2017 0.8  0.6 - 1.1 mg/dL Final    GFR Non- 05/16/2017 >60  >60 Final    Comment: >60 mL/min/1.73m2 EGFR, calc. for ages 25 and older using the  MDRD formula (not corrected for weight), is valid for stable  renal function.  GFR  05/16/2017 >60  >60 Final    Comment: Chronic Kidney Disease: less than 60 ml/min/1.73 sq.m. Kidney Failure: less than 15 ml/min/1.73 sq.m. Results valid for patients 18 years and older.       Calcium 05/16/2017 9.2  8.3 - 10.6 mg/dL Final    Total Protein 05/16/2017 7.1  6.4 - 8.2 g/dL Final    Alb 05/16/2017 4.1  3.4 - 5.0 g/dL Final    Albumin/Globulin Ratio 05/16/2017 1.4  1.1 - 2.2 Final    Total Bilirubin 05/16/2017 0.3  0.0 - 1.0 mg/dL Final    Alkaline Phosphatase 05/16/2017 75  40 - 129 U/L Final    ALT 05/16/2017 8* 10 - 40 U/L Final    AST 05/16/2017 10* 15 - 37 U/L Final    Globulin 05/16/2017 3.0  g/dL Final    Color, UA 05/16/2017 Straw  Straw/Yellow Final    Clarity, UA 05/16/2017 Clear  Clear Final    Glucose, Ur 05/16/2017 Negative  Negative mg/dL Final    Bilirubin Urine 05/16/2017 Negative  Negative Final    Ketones, Urine 05/16/2017 Negative  Negative mg/dL Final    Specific Gravity, UA 05/16/2017 <=1.005  1.005 - 1.030 Final    Blood, Urine 05/16/2017 Negative  Negative Final    pH, UA 05/16/2017 6.5 5.0 - 8.0 Final    Protein, UA 05/16/2017 Negative  Negative mg/dL Final    Urobilinogen, Urine 05/16/2017 0.2  <2.0 E.U./dL Final    Nitrite, Urine 05/16/2017 Negative  Negative Final    Leukocyte Esterase, Urine 05/16/2017 Negative  Negative Final    Microscopic Examination 05/16/2017 Not Indicated   Final    Urine Type 05/16/2017 Not Specified   Final    C difficile Toxin, EIA 05/16/2017 Negative for Clostridium difficile antigen and toxinNormal Range: Negative   Final    Ethanol Lvl 05/16/2017 None Detected  mg/dL Final    Comment:    None Detected  Conversion factor:  100 mg/dl = .100 g/dl  For Medical Purposes Only      Sodium 05/17/2017 143  136 - 145 mmol/L Final    Potassium 05/17/2017 3.7  3.5 - 5.1 mmol/L Final    Chloride 05/17/2017 108  99 - 110 mmol/L Final    CO2 05/17/2017 25  21 - 32 mmol/L Final    Anion Gap 05/17/2017 10  3 - 16 Final    Glucose 05/17/2017 87  70 - 99 mg/dL Final    BUN 05/17/2017 6* 7 - 20 mg/dL Final    CREATININE 05/17/2017 0.9  0.6 - 1.1 mg/dL Final    GFR Non- 05/17/2017 >60  >60 Final    Comment: >60 mL/min/1.73m2 EGFR, calc. for ages 25 and older using the  MDRD formula (not corrected for weight), is valid for stable  renal function.  GFR  05/17/2017 >60  >60 Final    Comment: Chronic Kidney Disease: less than 60 ml/min/1.73 sq.m. Kidney Failure: less than 15 ml/min/1.73 sq.m. Results valid for patients 18 years and older.       Calcium 05/17/2017 8.4  8.3 - 10.6 mg/dL Final    Phosphorus 05/17/2017 3.7  2.5 - 4.9 mg/dL Final    Alb 05/17/2017 3.4  3.4 - 5.0 g/dL Final    Magnesium 05/17/2017 1.80  1.80 - 2.40 mg/dL Final    WBC 05/17/2017 5.7  4.0 - 11.0 K/uL Final    RBC 05/17/2017 4.12  4.00 - 5.20 M/uL Final    Hemoglobin 05/17/2017 10.5* 12.0 - 16.0 g/dL Final    Hematocrit 05/17/2017 31.8* 36.0 - 48.0 % Final    MCV 05/17/2017 77.1* 80.0 - 100.0 fL Final    MCH 05/17/2017 25.4* 26.0 - 34.0 pg Final    MCHC 05/17/2017 32.9  31.0 - 36.0 g/dL Final    RDW 05/17/2017 17.5* 12.4 - 15.4 % Final    Platelets 70/08/4238 239  135 - 450 K/uL Final    MPV 05/17/2017 9.1  5.0 - 10.5 fL Final    Neutrophils % 05/17/2017 51.8  % Final    Lymphocytes % 05/17/2017 34.0  % Final    Monocytes % 05/17/2017 7.8  % Final    Eosinophils % 05/17/2017 5.0  % Final    Basophils % 05/17/2017 1.4  % Final    Neutrophils # 05/17/2017 2.9  1.7 - 7.7 K/uL Final    Lymphocytes # 05/17/2017 1.9  1.0 - 5.1 K/uL Final    Monocytes # 05/17/2017 0.4  0.0 - 1.3 K/uL Final    Eosinophils # 05/17/2017 0.3  0.0 - 0.6 K/uL Final    Basophils # 05/17/2017 0.1  0.0 - 0.2 K/uL Final    Total Protein 05/17/2017 5.6* 6.4 - 8.2 g/dL Final    Alkaline Phosphatase 05/17/2017 61  40 - 129 U/L Final    ALT 05/17/2017 <5* 10 - 40 U/L Final    AST 05/17/2017 8* 15 - 37 U/L Final    Total Bilirubin 05/17/2017 0.3  0.0 - 1.0 mg/dL Final    Bilirubin, Direct 05/17/2017 <0.2  0.0 - 0.3 mg/dL Final    Bilirubin, Indirect 05/17/2017 see below  0.0 - 1.0 mg/dL Final    Comment: Indirect Bilirubin cannot be calculated since Total Bilirubin  and/or Direct Bilirubin is below measurable range.  Lipase 05/17/2017 97.0* 13.0 - 60.0 U/L Final    Lactic Acid 05/17/2017 0.7  0.4 - 2.0 mmol/L Final    Magnesium 05/17/2017 1.80  1.80 - 2.40 mg/dL Final    Troponin 05/17/2017 <0.01  <0.01 ng/mL Final    aPTT 05/17/2017 27.0  21.0 - 31.8 sec Final    Comment: Therapeutic range: 41.8 - 80.2 sec    Effective 02-15-17 9:00am EST  Please note reference ranges have  changed for PTT Testing.  Protime 05/17/2017 12.6  9.6 - 13.0 sec Final    Comment: Effective 02-15-17 9:00am EST  Please note reference ranges have  changed for PT and INR Testing.  INR 05/17/2017 1.09  0.85 - 1.15 Final    Comment: Effective 02/15/2017 at 9am EST    Normal: 0.85 - 1.15  Therapeutic: 2.0 - 3.0  Pros.  Valve: 2.5 - 3.5  AMI: 2.0 - 3.0      Color, UA 05/17/2017

## 2017-10-26 NOTE — PATIENT INSTRUCTIONS
Patient Education        Learning About Obesity  What is obesity? Obesity means having so much body fat that your health is in danger. Having too much body fat can lead to type 2 diabetes, heart disease, high blood pressure, arthritis, sleep apnea, and stroke. Even if you don't feel bad now, think about these health risks. Do they seem like a good reason to start on a new path toward a healthier weight? Or do you have another personal, powerful reason for wanting to lose weight? Whatever it is, keep it in mind. It can be hard to change eating habits and exercise habits. But with your own reason and plan, you can do it. How do you know if your weight is in the obesity range? To know if your weight is in the obesity range, your doctor looks at your body mass index (BMI) and waist size. Your BMI is a number that is calculated from your weight and your height. To figure your BMI for yourself, get a BMI table from your doctor or use an online tool, such as http://www.matson.com/ on the [employer]. What causes obesity? When you take in more calories than you burn off, you gain weight. How you eat, how active you are, and other things affect how your body uses calories and whether you gain weight. If you have family members who have too much body fat, you may have inherited a tendency to gain weight. And your family also helps form your eating and lifestyle habits, which can lead to obesity. Also, our busy lives make it harder to plan and cook healthy meals. For many of us, it's easier to reach for prepared foods, go out to eat, or go to the drive-through. But these foods are often high in saturated fat and calories. Portions are often too large. What can you do to reach a healthy weight? Focus on health, not diets. Diets are hard to stay on and don't work in the long run.  It is very hard to stay with a diet that includes lots of big changes in your eating habits. Instead of a diet, focus on lifestyle changes that will improve your health and achieve the right balance of energy and calories. To lose weight, you need to burn more calories than you take in. You can do it by eating healthy foods in reasonable amounts and becoming more active, even a little bit every day. Making small changes over time can add up to a lot. Make a plan for change. Many people have found that naming their reasons for change and staying focused on their plan can make a big difference. Work with your doctor to create a plan that is right for you. · Ask yourself: Ivan Dart are my personal, most powerful reasons for wanting this change? What will my life look like when I've made the change? \"  · Set your long-term goal. Make it specific, such as \"I will lose x pounds. \"  · Break your long-term goal into smaller, short-term goals. Make these small steps specific and within your reach, things you know you can do. These steps are what keep you going from day to day. How can you stay on your plan for change? Be ready. Choose to start during a time when there are few events that might trigger slip-ups, like holidays, social events, and high-stress periods. Decide on your first few steps. Most people have more success when they make small changes, one step at a time. For example, you might switch a daily candy bar to a piece of fruit, walk 10 minutes more, or add more vegetables to a meal.  Line up your support people. Make sure you're not going to be alone as you make this change. Connect with people who understand how important it is to you. Ask family members and friends for help in keeping with your plan. And think about who could make it harder for you, and how to handle them. Try tracking. People who keep track of what they eat, feel, and do are better at losing weight. Try writing down things like:  · What and how much you eat.   · How you feel before and after each

## 2017-10-27 ASSESSMENT — ENCOUNTER SYMPTOMS
RESPIRATORY NEGATIVE: 1
EYES NEGATIVE: 1
GASTROINTESTINAL NEGATIVE: 1

## 2017-11-07 ENCOUNTER — TELEPHONE (OUTPATIENT)
Dept: PULMONOLOGY | Age: 42
End: 2017-11-07

## 2017-11-07 NOTE — TELEPHONE ENCOUNTER
Patient cancelled appointment on 11/14/17 with Dr. Kiera Miguel for sleep f/u. Reason: pt having other health problems (c-diff) and is not able to have sleep study on 11/9/17. Patient did not reschedule appointment. Pt states that she will r/s her sleep study then call back to r/s appt with Dr. Kiera Miguel. Pt is not going to be able to get testing for a while due to current medical problems. Last OV 8/15/17:    Assessment:       · Snoring- negative PSG   · Fatigue   · Morbid obesity   · Moderate COPD   · Preoperative clearance for bariatric surgery   · 28 pack year smoking        Plan:       · Recent negative PSG could be falsely negative given hypersomnia symptoms and family complaints of her snoring. Will repeat PSG  · Sonata 10 mg at time of in lab sleep tetsing- patient is worried might not be able to fall a sleep during the study    · Sleep hygiene  · Avoid sedatives, alcohol and caffeinated drinks at bed time  · No driving motorized vehicles or operating heavy machinery while fatigue, drowsy or sleepy  · Albuterol 2 puffs Q4-6 hrs PRN  · Smoking cessation counseling  · Nicotine patches- declined chantix      Upon examination and discussion I have determined that, from a pulmonary standpoint, patient is clear for surgery. Patient has intermediate preoperative pulmonary risk with 50.8% pulmonary complication rate based on tobacco use, COPD and MARINA. Perioperative pulmonary complications were discussed with the patient including: Atelectasis, infection, prolonged mechanical ventilation, and exacerbation of underlying chronic lung disease.  To minimize the perioperative pulmonary complications I recommend:   · Smoking cessation for 6- 8 weeks before surgery   · Inhaled bronchodilators perioperatively    · Delay elective surgery and antibiotics if respiratory infection present    · Deep breathing exercises or incentive spirometry  · Minimize duration of anesthesia    · Early ambulation and DVT prophylaxis. · Airway extubation (if patient requires intubation for surgery) performed only when the patient is fully awake and alert. · Removal of the endotracheal tube should take place in the operating room or PACU.   · The patient should be maintained in the semiupright or lateral, not supine, position   · Systemic opioids should be used cautiously because of their ability to depress the respiratory drive  · Benzodiazepines should be avoided because of their negative effects on the respiratory control and upper airway musculature.

## 2017-11-29 NOTE — TELEPHONE ENCOUNTER
Spoke to patient offered to reschedule appointment and patient declined. States not feeling well at is time and health issue. Will follow up with patient again in a few weeks if does not reschedule.

## 2017-12-28 PROBLEM — I26.99 ACUTE MASSIVE PULMONARY EMBOLISM (HCC): Status: ACTIVE | Noted: 2017-12-28

## 2017-12-28 PROBLEM — I26.99 PULMONARY EMBOLISM (HCC): Status: ACTIVE | Noted: 2017-12-28

## 2018-01-03 ENCOUNTER — OFFICE VISIT (OUTPATIENT)
Dept: PULMONOLOGY | Age: 43
End: 2018-01-03

## 2018-01-03 VITALS
HEIGHT: 65 IN | WEIGHT: 253 LBS | OXYGEN SATURATION: 92 % | TEMPERATURE: 97.5 F | SYSTOLIC BLOOD PRESSURE: 106 MMHG | DIASTOLIC BLOOD PRESSURE: 68 MMHG | RESPIRATION RATE: 16 BRPM | BODY MASS INDEX: 42.15 KG/M2 | HEART RATE: 75 BPM

## 2018-01-03 DIAGNOSIS — I26.99 OTHER ACUTE PULMONARY EMBOLISM WITHOUT ACUTE COR PULMONALE (HCC): ICD-10-CM

## 2018-01-03 DIAGNOSIS — G47.33 OSA (OBSTRUCTIVE SLEEP APNEA): Primary | ICD-10-CM

## 2018-01-03 DIAGNOSIS — I27.20 PULMONARY HYPERTENSION (HCC): ICD-10-CM

## 2018-01-03 PROCEDURE — G8427 DOCREV CUR MEDS BY ELIG CLIN: HCPCS | Performed by: INTERNAL MEDICINE

## 2018-01-03 PROCEDURE — 1111F DSCHRG MED/CURRENT MED MERGE: CPT | Performed by: INTERNAL MEDICINE

## 2018-01-03 PROCEDURE — 1036F TOBACCO NON-USER: CPT | Performed by: INTERNAL MEDICINE

## 2018-01-03 PROCEDURE — 99214 OFFICE O/P EST MOD 30 MIN: CPT | Performed by: INTERNAL MEDICINE

## 2018-01-03 PROCEDURE — G8417 CALC BMI ABV UP PARAM F/U: HCPCS | Performed by: INTERNAL MEDICINE

## 2018-01-03 PROCEDURE — G8484 FLU IMMUNIZE NO ADMIN: HCPCS | Performed by: INTERNAL MEDICINE

## 2018-01-03 NOTE — PATIENT INSTRUCTIONS
Please keep all of your future appointments scheduled by [facility] Imer GARCIA [facility] Pulmonary office. Sleep Hygiene. .. Tips for better sleep. .. Avoid naps. This will ensure you are sleepy at bedtime. If you have to take a nap, sleep less than 1 hour, before 3 pm.  Sleep only when sleepy; this reduces the time you are awake in bed. Regular exercise is recommended to help you deepen your sleep, but not within 4-6 hours of your bedtime. Timing of exercise is important, aim to exercise early in the morning or early afternoon. A light snack may help you fall asleep. Warm milk and foods high in the amino acid tryptophan, such as bananas, may help you to sleep  Be sure to avoid heavy, spicy or sugary foods 4-6 hours before bedtime and avoid at snack time. Stay away from stimulants such as caffeine and nicotine for at least 4-6 hours before bed. Stimulants can interfere with your ability to fall asleep. Caffeine is found in tea, cola, coffee, cocoa and chocolate and is best avoided at bedtime. Nicotine is found in tobacco products. Avoid alcohol 4-6 hours before bedtime. Alcohol has an immediate sleep-inducing effect, after a few hours when alcohol levels fall there is a stimulant or wake-up effect and will cause fragmented sleep. Sleep rituals are important. Give your body clues it is time to slow down and sleep. Examples include; yoga, deep breathing, listen to relaxing music, a hot bath or a few minutes of reading. Have a fixed bedtime and awakening time, Even on weekends! You will feel better keeping a regular sleep cycle, even if you are retired or not working. Get into your favorite sleep position. If not asleep in 30 minutes, get up and do something boring until you feel sleepy. Remember not to expose yourself to bright lights such as TV, phone or tablet screens. Only use your bed for sleeping. Do not use your bed as an office, workroom or recreation room. Use comfortable bedding.

## 2018-01-03 NOTE — COMMUNICATION BODY
Assessment:       · Mild MARINA   · Moderate COPD   · 28 pack year smoking -quit last week   · Bilateral pulmonary embolism 12/29/2017-Likely provoked by hormonal therapy   · Severe pulmonary hypertension - likely due to acute PE.   Sleep apnea might be contributing        Plan:       · CPAP titration   · Sleep hygiene  · Avoid sedatives, alcohol and caffeinated drinks at bed time  · No driving motorized vehicles or operating heavy machinery while fatigue, drowsy or sleepy  · Albuterol 2 puffs Q4-6 hrs PRN  · Advised to continue with smoking cessation  · Anticoagulation for 3 months- Will need hypercoagulable workup once off Xarelto    · Off hormonal therapy   · Repeat Echo in 3 months- further testing will follow if not improved

## 2018-01-03 NOTE — LETTER
[facility] Pulmonary, Critical Care, and Sleep  [address], [address] [place] [address]  Phone: [phone]  Fax: [phone]    January 3, 2018     Patient: Tj Manuel   MR Number: [id number]   YOB: 1975   Date of Visit: 1/3/2018     Dear Dr. Bogdan Sterling: Thank you for the request for consultation for Loria Lesches to me for the evaluation of sleep apnea. Below are the relevant portions of my assessment and plan of care. Assessment:       · Mild MARINA   · Moderate COPD   · 28 pack year smoking -quit last week   · Bilateral pulmonary embolism 12/29/2017-Likely provoked by hormonal therapy   · Severe pulmonary hypertension - likely due to acute PE. Sleep apnea might be contributing    Plan:       · CPAP titration   · Sleep hygiene  · Avoid sedatives, alcohol and caffeinated drinks at bed time  · No driving motorized vehicles or operating heavy machinery while fatigue, drowsy or sleepy  · Albuterol 2 puffs Q4-6 hrs PRN  · Advised to continue with smoking cessation  · Anticoagulation for 3 months- Will need hypercoagulable workup once off Xarelto    · Off hormonal therapy   · Repeat Echo in 3 months- further testing will follow if not improved     If you have questions, please do not hesitate to call me. I look forward to following Tete Isbell along with you.     Sincerely,      Diana Mcfarlane MD

## 2018-01-03 NOTE — PROGRESS NOTES
extended release capsule, TAKE 1 CAPSULE BY MOUTH TWICE DAILY, Disp: 60 capsule, Rfl: 0    dicyclomine (BENTYL) 20 MG tablet, TAKE 2 TABLETS BY MOUTH THREE TIMES DAILY, Disp: 180 tablet, Rfl: 3    RABEprazole (ACIPHEX) 20 MG tablet, TAKE 2 TABLETS BY MOUTH DAILY (Patient taking differently: TAKE 1 TABLETS BY MOUTH BID), Disp: 60 tablet, Rfl: 3    calcium carbonate (TUMS) 500 MG chewable tablet, Take 2 tablets by mouth as needed Indications: for reflux , Disp: , Rfl:     cetirizine (ZYRTEC) 10 MG tablet, TAKE 1 TABLET BY MOUTH DAILY, Disp: 30 tablet, Rfl: 5    metoprolol tartrate (LOPRESSOR) 50 MG tablet, Take 50 mg by mouth daily, Disp: , Rfl:     hydrOXYzine (ATARAX) 25 MG tablet, Take 25-50 mg by mouth 2 times daily , Disp: , Rfl:     varenicline (CHANTIX STARTING MONTH JOSE) 0.5 MG X 11 & 1 MG X 42 tablet, By mouth., Disp: 1 each, Rfl: 0    scopolamine (TRANSDERM-SCOP) transdermal patch, Place 1 patch onto the skin every 72 hours (Patient taking differently: Place 1 patch onto the skin every 72 hours as needed ), Disp: 4 patch, Rfl: 3    promethazine (PHENERGAN) 25 MG tablet, TAKE 1 TABLET BY MOUTH EVERY 8 HOURS AS NEEDED FOR NAUSEA, Disp: 60 tablet, Rfl: 6    pregabalin (LYRICA) 25 MG capsule, Take 150 mg by mouth 2 times daily , Disp: , Rfl:     baclofen (LIORESAL) 10 MG tablet, Take 20 mg by mouth 3 times daily , Disp: , Rfl:     tamsulosin (FLOMAX) 0.4 MG capsule, Take 0.4 mg by mouth daily, Disp: , Rfl:     topiramate (TOPAMAX) 25 MG tablet, Take 100 mg by mouth 2 times daily , Disp: , Rfl:     diphenhydrAMINE (BENADRYL) 50 MG capsule, Take 50 mg by mouth nightly as needed for Allergies. , Disp: , Rfl:     [START ON 1/21/2018] rivaroxaban (XARELTO) 20 MG TABS tablet, Take 1 tablet by mouth daily (with breakfast), Disp: 30 tablet, Rfl: 3    HYDROcodone-acetaminophen (NORCO) 5-325 MG per tablet, Take 1 tablet by mouth every 6 hours as needed for Pain ., Disp: , Rfl:     hydrochlorothiazide (HYDRODIURIL) 25 MG tablet, TAKE 1 TABLET BY MOUTH DAILY, Disp: 30 tablet, Rfl: 3      Objective:   PHYSICAL EXAM:    Blood pressure 106/68, pulse 75, temperature 97.5 °F (36.4 °C), temperature source Oral, resp. rate 16, height 5' 5\" (1.651 m), weight 253 lb (114.8 kg), last menstrual period 07/31/2011, SpO2 92 %, not currently breastfeeding.' on RA  Gen: No distress. Obese. BMI of 42.10  Eyes: PERRL. No sclera icterus. No conjunctival injection. ENT: No discharge. Pharynx clear. Mallampati class IV. Neck: Trachea midline. No obvious mass. Neck circumference 15\"  Resp: No accessory muscle use. No crackles. No wheezes. No rhonchi. No dullness on percussion. Good air entry. CV: Regular rate. Regular rhythm. No murmur or rub. No edema. GI: Non-tender. Non-distended. No hernia. Skin: Warm and dry. No nodule on exposed extremities. Lymph: No cervical LAD. No supraclavicular LAD. M/S: No cyanosis. No joint deformity. No clubbing. Neuro: Awake. Alert. Moves all four extremities. Psych: Oriented x 3. No anxiety. DATA reviewed by me:   PFTs 05/15/2017 FEV1 1.82(59%) FEV1/FVC 55% TLC 4.94(91%) DLCO 15.55(61%)   PFTs 11/21/2016 FEV1 1.74(57%) FEV1/FVC 51% TLC 4.97(91%) DLCO 16.13(63%)   TSH 1.31     PSG 6/2/2017 AHI 3.5   PSG 9/14/2017 AHI 6.7   CXR 5/15/2017 no acute cardiopulmonary disease       CTPA 12/28/2017  images reviewed by me and showed:   Bilateral pulmonary emboli as detailed above. Mild flattening of the intraventricular septum appears to be present suggesting possible right heart strain. Echo 12/29/17  Left ventricular systolic function is normal with the EF 55%. Systolic and diastolic septal flattening consistent with right ventricular pressure and volume overload. Right ventricle is enlarged and hypokinetic. Right atrial size is mildly dilated . Moderate tricuspid regurgitation.   Systolic pulmonary artery pressure (SPAP) estimated at 64 mmHg consistent  with severe pulmonary hypertension (RA pressure 3 mmHg). Assessment:       · Mild MARINA   · Moderate COPD   · 28 pack year smoking -quit last week   · Bilateral pulmonary embolism 12/29/2017-Likely provoked by hormonal therapy   · Severe pulmonary hypertension - likely due to acute PE.   Sleep apnea might be contributing        Plan:       · CPAP titration   · Sleep hygiene  · Avoid sedatives, alcohol and caffeinated drinks at bed time  · No driving motorized vehicles or operating heavy machinery while fatigue, drowsy or sleepy  · Albuterol 2 puffs Q4-6 hrs PRN  · Advised to continue with smoking cessation  · Anticoagulation for 3 months- Will need hypercoagulable workup once off Xarelto    · Off hormonal therapy   · Repeat Echo in 3 months- further testing will follow if not improved

## 2018-01-17 ENCOUNTER — HOSPITAL ENCOUNTER (OUTPATIENT)
Dept: SLEEP MEDICINE | Age: 43
Discharge: HOME | End: 2018-01-17
Attending: INTERNAL MEDICINE | Admitting: INTERNAL MEDICINE

## 2018-01-17 DIAGNOSIS — G47.33 OSA (OBSTRUCTIVE SLEEP APNEA): ICD-10-CM

## 2018-01-22 DIAGNOSIS — G47.33 OSA (OBSTRUCTIVE SLEEP APNEA): Primary | ICD-10-CM

## 2018-03-22 ENCOUNTER — OFFICE VISIT (OUTPATIENT)
Dept: BARIATRICS/WEIGHT MGMT | Age: 43
End: 2018-03-22

## 2018-03-22 ENCOUNTER — OFFICE VISIT (OUTPATIENT)
Dept: CARDIOLOGY CLINIC | Age: 43
End: 2018-03-22

## 2018-03-22 VITALS
SYSTOLIC BLOOD PRESSURE: 90 MMHG | HEIGHT: 65 IN | DIASTOLIC BLOOD PRESSURE: 60 MMHG | WEIGHT: 243 LBS | HEART RATE: 74 BPM | BODY MASS INDEX: 40.48 KG/M2

## 2018-03-22 VITALS
OXYGEN SATURATION: 97 % | HEART RATE: 68 BPM | SYSTOLIC BLOOD PRESSURE: 86 MMHG | DIASTOLIC BLOOD PRESSURE: 62 MMHG | WEIGHT: 243 LBS | HEIGHT: 65 IN | BODY MASS INDEX: 40.48 KG/M2

## 2018-03-22 DIAGNOSIS — E66.01 MORBID OBESITY WITH BMI OF 40.0-44.9, ADULT (HCC): Primary | ICD-10-CM

## 2018-03-22 DIAGNOSIS — I26.09 OTHER CHRONIC PULMONARY EMBOLISM WITH ACUTE COR PULMONALE (HCC): Primary | ICD-10-CM

## 2018-03-22 DIAGNOSIS — I26.99 ACUTE MASSIVE PULMONARY EMBOLISM (HCC): ICD-10-CM

## 2018-03-22 DIAGNOSIS — I27.82 OTHER CHRONIC PULMONARY EMBOLISM WITH ACUTE COR PULMONALE (HCC): Primary | ICD-10-CM

## 2018-03-22 DIAGNOSIS — G47.33 OSA (OBSTRUCTIVE SLEEP APNEA): ICD-10-CM

## 2018-03-22 DIAGNOSIS — E66.01 MORBID OBESITY (HCC): ICD-10-CM

## 2018-03-22 DIAGNOSIS — I27.20 PULMONARY HYPERTENSION (HCC): ICD-10-CM

## 2018-03-22 PROCEDURE — G8417 CALC BMI ABV UP PARAM F/U: HCPCS | Performed by: INTERNAL MEDICINE

## 2018-03-22 PROCEDURE — 1036F TOBACCO NON-USER: CPT | Performed by: INTERNAL MEDICINE

## 2018-03-22 PROCEDURE — G8427 DOCREV CUR MEDS BY ELIG CLIN: HCPCS | Performed by: NURSE PRACTITIONER

## 2018-03-22 PROCEDURE — 99213 OFFICE O/P EST LOW 20 MIN: CPT | Performed by: NURSE PRACTITIONER

## 2018-03-22 PROCEDURE — G8484 FLU IMMUNIZE NO ADMIN: HCPCS | Performed by: NURSE PRACTITIONER

## 2018-03-22 PROCEDURE — G8484 FLU IMMUNIZE NO ADMIN: HCPCS | Performed by: INTERNAL MEDICINE

## 2018-03-22 PROCEDURE — G8427 DOCREV CUR MEDS BY ELIG CLIN: HCPCS | Performed by: INTERNAL MEDICINE

## 2018-03-22 PROCEDURE — G8417 CALC BMI ABV UP PARAM F/U: HCPCS | Performed by: NURSE PRACTITIONER

## 2018-03-22 PROCEDURE — 99214 OFFICE O/P EST MOD 30 MIN: CPT | Performed by: INTERNAL MEDICINE

## 2018-03-22 PROCEDURE — 1036F TOBACCO NON-USER: CPT | Performed by: NURSE PRACTITIONER

## 2018-03-22 ASSESSMENT — ENCOUNTER SYMPTOMS
RESPIRATORY NEGATIVE: 1
GASTROINTESTINAL NEGATIVE: 1
EYES NEGATIVE: 1

## 2018-03-22 NOTE — PROGRESS NOTES
[address] [facility]   Cardiovascular Evaluation    PATIENT: Pauline Juarez  DATE: 3/22/2018  MRN: [id number]  CSN: [id number]  : 1975      Primary Care Doctor: Pamella Pascal MD  Reason for evaluation:   Follow-Up from Hospital      Subjective:   History of present illness on initial date of evaluation:   Pauline Juarez is a 38 y.o. patient who presents for hospital follow up of PE, right heart strain and severe pulmonary hypertension. She presented to the hospital on 18 with complaints of chest pain. She was found to have bilateral PE's. PAP pressure of 64mmHg suggests some degree of chronic Right heart dysfunction as acute PE would not generate pressures to that degree   Today she reports feeling well overall. She denies chest pain, edema, SOB or syncope. Patient Active Problem List   Diagnosis    Depression with anxiety    SIRENA (generalized anxiety disorder)    Hypothyroidism    Vitamin D deficiency    IBS (irritable bowel syndrome)    Rebound headache    Low back pain radiating to both legs    Low back pain    Hip pain    Insomnia    Arthralgia    C. difficile diarrhea    Abdominal pain, other specified site    Myalgia    Pelvic pain in female    Chronic abdominal pain    Breast nodule    Chronic nausea    Recurrent Clostridium difficile diarrhea    Primary osteoarthritis of right knee    Smoking    Morbid obesity with BMI of 40.0-44.9, adult (HCC)    Chronic GERD    Snoring    Localized osteoarthrosis, lower leg    Neuroma digital nerve    Pre-op exam    Abnormal EKG    Mitchell's neuroma of right foot    Hiatal hernia    Acute pancreatitis    Obesity (BMI 30-39. 9)    Chondromalacia of patellofemoral joint    Patellar maltracking    Pulmonary embolus (HCC)    Acute massive pulmonary embolism (HCC)    Pulmonary hypertension         Past Medical History:   has a past medical history of Abdominal pain, other specified site;  Anemia; Anxiety; Chronic GERD; Chronic nausea; Clostridium difficile diarrhea; Depression; Fibromyalgia; GERD (gastroesophageal reflux disease); Morbid obesity with BMI of 40.0-44.9, adult ([facility] [address] Neuroma digital nerve; PONV (postoperative nausea and vomiting); Primary osteoarthritis of right knee; and Thyroid disease. Surgical History:   has a past surgical history that includes  section; Cholecystectomy; hernia repair; laparoscopy; Hysterectomy; Colonoscopy (13); Dilation and curettage of uterus; other surgical history; Bunionectomy (Right); Cystoscopy; Foot surgery; Hemorrhoid surgery; Foot surgery (Right, 2016); and Upper gastrointestinal endoscopy (2017). Social History:   reports that she has quit smoking. Her smoking use included Cigarettes. She quit after 28.00 years of use. She has never used smokeless tobacco. She reports that she does not drink alcohol or use drugs. Family History:  No evidence for sudden cardiac death or premature CAD    Home Medications:  Reviewed and are listed in nursing record. and/or listed below  Current Outpatient Prescriptions   Medication Sig Dispense Refill    rivaroxaban (XARELTO) 20 MG TABS tablet Take 1 tablet by mouth daily (with breakfast) 30 tablet 3    HYSINGLA ER 40 MG extended release tablet Take 1 tablet by mouth daily . 0    ranitidine (ZANTAC) 300 MG tablet Take 300 mg by mouth daily  3    VILAZODONE HCL 40 MG Tablet TK 1 T PO  QD  0    HYDROcodone-acetaminophen (NORCO) 5-325 MG per tablet Take 1 tablet by mouth every 6 hours as needed for Pain .       albuterol sulfate HFA (VENTOLIN HFA) 108 (90 BASE) MCG/ACT inhaler Inhale 2 puffs into the lungs every 6 hours as needed for Wheezing or Shortness of Breath 1 Inhaler 6    ALPRAZolam (XANAX) 1 MG tablet TAKE 1 TABLET BY MOUTH UP TO FOUR TIMES DAILY AS NEEDED (Patient taking differently: Take 1 tablet bid) 120 tablet 0    ondansetron (ZOFRAN ODT) 4 MG disintegrating tablet Take 1 tablet by mouth every 8 hours as needed for Nausea or Vomiting 15 tablet 0    DULoxetine (CYMBALTA) 60 MG extended release capsule TAKE 1 CAPSULE BY MOUTH TWICE DAILY 60 capsule 0    dicyclomine (BENTYL) 20 MG tablet TAKE 2 TABLETS BY MOUTH THREE TIMES DAILY 180 tablet 3    RABEprazole (ACIPHEX) 20 MG tablet TAKE 2 TABLETS BY MOUTH DAILY (Patient taking differently: TAKE 1 TABLETS BY MOUTH BID) 60 tablet 3    calcium carbonate (TUMS) 500 MG chewable tablet Take 2 tablets by mouth as needed Indications: for reflux       cetirizine (ZYRTEC) 10 MG tablet TAKE 1 TABLET BY MOUTH DAILY 30 tablet 5    metoprolol tartrate (LOPRESSOR) 50 MG tablet Take 50 mg by mouth daily      hydrOXYzine (ATARAX) 25 MG tablet Take 50 mg by mouth 3 times daily       scopolamine (TRANSDERM-SCOP) transdermal patch Place 1 patch onto the skin every 72 hours (Patient taking differently: Place 1 patch onto the skin every 72 hours as needed ) 4 patch 3    promethazine (PHENERGAN) 25 MG tablet TAKE 1 TABLET BY MOUTH EVERY 8 HOURS AS NEEDED FOR NAUSEA 60 tablet 6    pregabalin (LYRICA) 25 MG capsule Take 150 mg by mouth 2 times daily       baclofen (LIORESAL) 10 MG tablet Take 20 mg by mouth 3 times daily       tamsulosin (FLOMAX) 0.4 MG capsule Take 0.4 mg by mouth daily      topiramate (TOPAMAX) 25 MG tablet Take 100 mg by mouth 2 times daily       diphenhydrAMINE (BENADRYL) 50 MG capsule Take 50 mg by mouth nightly as needed for Allergies.  rivaroxaban (XARELTO) 15 MG TABS tablet Take 1 tablet by mouth 2 times daily (with meals) for 20 days 40 tablet 0    nicotine (NICODERM CQ) 14 MG/24HR Place 1 patch onto the skin every 24 hours 14 patch 0    brexpiprazole (REXULTI) 3 MG TABS tablet Take 4 mg by mouth daily       hydrochlorothiazide (HYDRODIURIL) 25 MG tablet TAKE 1 TABLET BY MOUTH DAILY 30 tablet 3    varenicline (CHANTIX STARTING MONTH PAK) 0.5 MG X 11 & 1 MG X 42 tablet By mouth.  1 each 0     No current Results   Component Value Date     2017    K 4.3 2017     2017    CO2 24 2017    BUN 17 2017    CREATININE 1.0 2017    GFRAA >60 2017    GFRAA >60 2013    AGRATIO 1.3 2017    LABGLOM >60 2017    GLUCOSE 91 2017    PROT 7.7 2017    PROT 6.6 2012    CALCIUM 8.4 2017    BILITOT 0.6 2017    ALKPHOS 113 2017    AST 15 2017    ALT 11 2017     PT/INR:   No results found for: PTINR  Liver:  No components found for: CHLPL  Lab Results   Component Value Date    ALT 11 2017    AST 15 2017    ALKPHOS 113 2017    BILITOT 0.6 2017     Lab Results   Component Value Date    LABA1C 5.3 2017     Lipids:         Lab Results   Component Value Date    TRIG 266 (H) 2017    TRIG 161 (H) 2016    TRIG 114 2015            Lab Results   Component Value Date    HDL 38 (L) 2017    HDL 59 2016    HDL 63 (H) 2015            Lab Results   Component Value Date    LDLCALC 136 (H) 2017    LDLCALC 128 (H) 2016    LDLCALC 113 (H) 2015            Lab Results   Component Value Date    LABVLDL 53 2017    LABVLDL 32 2016    LABVLDL 23 2015         CARDIAC DATA   EK2017  Sinus tachycardia   Nonspecific T wave abnormality   Abnormal ECG        Echo 18  Left ventricular systolic function is normal with the EF 55%. Systolic and diastolic septal flattening consistent with right ventricular  pressure and volume overload. Right ventricle is enlarged and hypokinetic. Right atrial size is mildly dilated . Moderate tricuspid regurgitation. Systolic pulmonary artery pressure (SPAP) estimated at 64 mmHg consistent  with severe pulmonary hypertension (RA pressure 3 mmHg).       STRESS TEST:    CARDIAC CATH:    VASCULAR/OTHER IMAGING:      Assessment and Plan   Javier Barber is a 38 y.o. female who presents today for the

## 2018-03-22 NOTE — PATIENT INSTRUCTIONS
Key Low Carb, High Healthy Fat Dietary Points:    - Meats (preferably organic or grass fed) are great sources of protein and are low in carbohydrates. Ahmed [place] with coconut, olive, avocado, or almond oils. - When buying dairy, choose regular or full fat options. - Choose vegetables that grow above ground as they are generally lower in carbohydrates. - Avoid bread, rice, potatoes, pasta and all sources of simple sugars (desserts, soda, breakfast cereals). - Choose beverages that are calorie and sugar free, such as water or flavored sosa. - When eating fruit, choose berries as a snack option a few times a week.

## 2018-03-22 NOTE — PROGRESS NOTES
[Pseudoephedrine Hcl]     Sulfa Antibiotics     Ultram [Tramadol]      Can take percocet , morphine     Influenza Vaccines     Vancomycin Rash         Current Outpatient Prescriptions:     rivaroxaban (XARELTO) 20 MG TABS tablet, Take 1 tablet by mouth daily (with breakfast), Disp: 30 tablet, Rfl: 3    HYSINGLA ER 40 MG extended release tablet, Take 1 tablet by mouth daily . , Disp: , Rfl: 0    ranitidine (ZANTAC) 300 MG tablet, Take 300 mg by mouth daily, Disp: , Rfl: 3    nicotine (NICODERM CQ) 14 MG/24HR, Place 1 patch onto the skin every 24 hours, Disp: 14 patch, Rfl: 0    VILAZODONE HCL 40 MG Tablet, TK 1 T PO  QD, Disp: , Rfl: 0    HYDROcodone-acetaminophen (NORCO) 5-325 MG per tablet, Take 1 tablet by mouth every 6 hours as needed for Pain ., Disp: , Rfl:     brexpiprazole (REXULTI) 3 MG TABS tablet, Take 4 mg by mouth daily , Disp: , Rfl:     albuterol sulfate HFA (VENTOLIN HFA) 108 (90 BASE) MCG/ACT inhaler, Inhale 2 puffs into the lungs every 6 hours as needed for Wheezing or Shortness of Breath, Disp: 1 Inhaler, Rfl: 6    ALPRAZolam (XANAX) 1 MG tablet, TAKE 1 TABLET BY MOUTH UP TO FOUR TIMES DAILY AS NEEDED (Patient taking differently: Take 1 tablet bid), Disp: 120 tablet, Rfl: 0    ondansetron (ZOFRAN ODT) 4 MG disintegrating tablet, Take 1 tablet by mouth every 8 hours as needed for Nausea or Vomiting, Disp: 15 tablet, Rfl: 0    DULoxetine (CYMBALTA) 60 MG extended release capsule, TAKE 1 CAPSULE BY MOUTH TWICE DAILY, Disp: 60 capsule, Rfl: 0    hydrochlorothiazide (HYDRODIURIL) 25 MG tablet, TAKE 1 TABLET BY MOUTH DAILY, Disp: 30 tablet, Rfl: 3    dicyclomine (BENTYL) 20 MG tablet, TAKE 2 TABLETS BY MOUTH THREE TIMES DAILY, Disp: 180 tablet, Rfl: 3    RABEprazole (ACIPHEX) 20 MG tablet, TAKE 2 TABLETS BY MOUTH DAILY (Patient taking differently: TAKE 1 TABLETS BY MOUTH BID), Disp: 60 tablet, Rfl: 3    calcium carbonate (TUMS) 500 MG chewable tablet, Take 2 tablets by mouth as needed Indications: for reflux , Disp: , Rfl:     cetirizine (ZYRTEC) 10 MG tablet, TAKE 1 TABLET BY MOUTH DAILY, Disp: 30 tablet, Rfl: 5    metoprolol tartrate (LOPRESSOR) 50 MG tablet, Take 50 mg by mouth daily, Disp: , Rfl:     hydrOXYzine (ATARAX) 25 MG tablet, Take 50 mg by mouth 3 times daily , Disp: , Rfl:     varenicline (CHANTIX STARTING MONTH PAK) 0.5 MG X 11 & 1 MG X 42 tablet, By mouth., Disp: 1 each, Rfl: 0    scopolamine (TRANSDERM-SCOP) transdermal patch, Place 1 patch onto the skin every 72 hours (Patient taking differently: Place 1 patch onto the skin every 72 hours as needed ), Disp: 4 patch, Rfl: 3    promethazine (PHENERGAN) 25 MG tablet, TAKE 1 TABLET BY MOUTH EVERY 8 HOURS AS NEEDED FOR NAUSEA, Disp: 60 tablet, Rfl: 6    pregabalin (LYRICA) 25 MG capsule, Take 150 mg by mouth 2 times daily , Disp: , Rfl:     baclofen (LIORESAL) 10 MG tablet, Take 20 mg by mouth 3 times daily , Disp: , Rfl:     tamsulosin (FLOMAX) 0.4 MG capsule, Take 0.4 mg by mouth daily, Disp: , Rfl:     topiramate (TOPAMAX) 25 MG tablet, Take 100 mg by mouth 2 times daily , Disp: , Rfl:     diphenhydrAMINE (BENADRYL) 50 MG capsule, Take 50 mg by mouth nightly as needed for Allergies. , Disp: , Rfl:     rivaroxaban (XARELTO) 15 MG TABS tablet, Take 1 tablet by mouth 2 times daily (with meals) for 20 days, Disp: 40 tablet, Rfl: 0    Patient Active Problem List   Diagnosis    Depression with anxiety    SIRENA (generalized anxiety disorder)    Hypothyroidism    Vitamin D deficiency    IBS (irritable bowel syndrome)    Rebound headache    Low back pain radiating to both legs    Low back pain    Hip pain    Insomnia    Arthralgia    C. difficile diarrhea    Abdominal pain, other specified site    Myalgia    Pelvic pain in female    Chronic abdominal pain    Breast nodule    Chronic nausea    Recurrent Clostridium difficile diarrhea    Primary osteoarthritis of right knee    Smoking    Morbid obesity with BMI of 40.0-44.9, adult ([facility] [address]    Chronic GERD    Snoring    Localized osteoarthrosis, lower leg    Neuroma digital nerve    Pre-op exam    Abnormal EKG    Mitchell's neuroma of right foot    Hiatal hernia    Acute pancreatitis    Obesity (BMI 30-39. 9)    Chondromalacia of patellofemoral joint    Patellar maltracking    Pulmonary embolus (HCC)    Acute massive pulmonary embolism (HCC)    Pulmonary hypertension       Review of Systems   Constitutional: Negative. HENT: Negative. Eyes: Negative. Respiratory: Negative. Cardiovascular: Negative. Gastrointestinal: Negative. Skin: Negative. Neurological: Negative. Physical Exam   Constitutional: She is oriented to person, place, and time. She appears well-developed and well-nourished. HENT:   Head: Normocephalic and atraumatic. Eyes: Pupils are equal, round, and reactive to light. Neck: Normal range of motion. Cardiovascular: Normal rate. Pulmonary/Chest: Effort normal.   Musculoskeletal: Normal range of motion. Neurological: She is alert and oriented to person, place, and time. Psychiatric: She has a normal mood and affect.  Her behavior is normal. Judgment and thought content normal.       Admission on 12/28/2017, Discharged on 12/31/2017   Component Date Value Ref Range Status    WBC 12/28/2017 11.6* 4.0 - 11.0 K/uL Final    RBC 12/28/2017 5.19  4.00 - 5.20 M/uL Final    Hemoglobin 12/28/2017 12.6  12.0 - 16.0 g/dL Final    Hematocrit 12/28/2017 40.0  36.0 - 48.0 % Final    MCV 12/28/2017 77.1* 80.0 - 100.0 fL Final    MCH 12/28/2017 24.4* 26.0 - 34.0 pg Final    MCHC 12/28/2017 31.6  31.0 - 36.0 g/dL Final    RDW 12/28/2017 15.8* 12.4 - 15.4 % Final    Platelets 66/46/1153 329  135 - 450 K/uL Final    MPV 12/28/2017 8.5  5.0 - 10.5 fL Final    Neutrophils % 12/28/2017 73.4  % Final    Lymphocytes % 12/28/2017 18.8  % Final    Monocytes % 12/28/2017 5.4  % Final    Eosinophils % 12/28/2017 1.1  % Final    Basophils % 12/28/2017 1.3  % Final    Neutrophils # 12/28/2017 8.5* 1.7 - 7.7 K/uL Final    Lymphocytes # 12/28/2017 2.2  1.0 - 5.1 K/uL Final    Monocytes # 12/28/2017 0.6  0.0 - 1.3 K/uL Final    Eosinophils # 12/28/2017 0.1  0.0 - 0.6 K/uL Final    Basophils # 12/28/2017 0.2  0.0 - 0.2 K/uL Final    Sodium 12/28/2017 141  136 - 145 mmol/L Final    Potassium 12/28/2017 4.1  3.5 - 5.1 mmol/L Final    Chloride 12/28/2017 104  99 - 110 mmol/L Final    CO2 12/28/2017 21  21 - 32 mmol/L Final    Anion Gap 12/28/2017 16  3 - 16 Final    Glucose 12/28/2017 129* 70 - 99 mg/dL Final    BUN 12/28/2017 19  7 - 20 mg/dL Final    CREATININE 12/28/2017 1.2* 0.6 - 1.1 mg/dL Final    GFR Non- 12/28/2017 49* >60 Final    Comment: >60 mL/min/1.73m2 EGFR, calc. for ages 25 and older using the  MDRD formula (not corrected for weight), is valid for stable  renal function.  GFR  12/28/2017 59* >60 Final    Comment: Chronic Kidney Disease: less than 60 ml/min/1.73 sq.m. Kidney Failure: less than 15 ml/min/1.73 sq.m. Results valid for patients 18 years and older.  Calcium 12/28/2017 9.4  8.3 - 10.6 mg/dL Final    Total Protein 12/28/2017 7.7  6.4 - 8.2 g/dL Final    Alb 12/28/2017 4.3  3.4 - 5.0 g/dL Final    Albumin/Globulin Ratio 12/28/2017 1.3  1.1 - 2.2 Final    Total Bilirubin 12/28/2017 0.6  0.0 - 1.0 mg/dL Final    Alkaline Phosphatase 12/28/2017 113  40 - 129 U/L Final    ALT 12/28/2017 11  10 - 40 U/L Final    AST 12/28/2017 15  15 - 37 U/L Final    Globulin 12/28/2017 3.4  g/dL Final    Protime 12/28/2017 13.6* 9.6 - 13.0 sec Final    Comment: Effective 02-15-17 9:00am EST  Please note reference ranges have  changed for PT and INR Testing.  INR 12/28/2017 1.20* 0.85 - 1.15 Final    Comment: Effective 08/01/2017 at 3pm EST    Normal: 0.86 - 1.14  Therapeutic: 2.0 - 3.0  Pros.  Valve: 2.5 - 3.5  AMI: 2.0 - 3.0      Troponin 12/28/2017 0.01  <0.01

## 2018-04-02 ENCOUNTER — HOSPITAL ENCOUNTER (OUTPATIENT)
Dept: NON INVASIVE DIAGNOSTICS | Age: 43
Discharge: HOME | End: 2018-04-02
Attending: INTERNAL MEDICINE | Admitting: INTERNAL MEDICINE

## 2018-04-02 DIAGNOSIS — I26.99 OTHER PULMONARY EMBOLISM WITHOUT ACUTE COR PULMONALE (HCC): ICD-10-CM

## 2018-04-02 LAB
LV EF: 58 %
LVEF MODALITY: NORMAL

## 2018-04-03 ENCOUNTER — TELEPHONE (OUTPATIENT)
Dept: CARDIOLOGY CLINIC | Age: 43
End: 2018-04-03

## 2018-04-24 ENCOUNTER — TELEPHONE (OUTPATIENT)
Dept: PULMONOLOGY | Age: 43
End: 2018-04-24

## 2018-05-03 ENCOUNTER — OFFICE VISIT (OUTPATIENT)
Dept: BARIATRICS/WEIGHT MGMT | Age: 43
End: 2018-05-03

## 2018-05-03 ENCOUNTER — HOSPITAL ENCOUNTER (OUTPATIENT)
Dept: MRI IMAGING | Age: 43
Discharge: HOME | End: 2018-05-03

## 2018-05-03 VITALS
HEART RATE: 68 BPM | RESPIRATION RATE: 16 BRPM | DIASTOLIC BLOOD PRESSURE: 70 MMHG | SYSTOLIC BLOOD PRESSURE: 106 MMHG | HEIGHT: 65 IN | WEIGHT: 249 LBS | BODY MASS INDEX: 41.48 KG/M2

## 2018-05-03 DIAGNOSIS — E66.01 MORBID OBESITY WITH BMI OF 40.0-44.9, ADULT (HCC): Primary | ICD-10-CM

## 2018-05-03 DIAGNOSIS — M54.16 LUMBAR RADICULOPATHY: ICD-10-CM

## 2018-05-03 PROCEDURE — 99213 OFFICE O/P EST LOW 20 MIN: CPT | Performed by: NURSE PRACTITIONER

## 2018-05-03 PROCEDURE — G8427 DOCREV CUR MEDS BY ELIG CLIN: HCPCS | Performed by: NURSE PRACTITIONER

## 2018-05-03 PROCEDURE — G8417 CALC BMI ABV UP PARAM F/U: HCPCS | Performed by: NURSE PRACTITIONER

## 2018-05-03 PROCEDURE — 1036F TOBACCO NON-USER: CPT | Performed by: NURSE PRACTITIONER

## 2018-05-03 RX ORDER — BUSPIRONE HYDROCHLORIDE 5 MG/1
TABLET ORAL
Refills: 0 | COMMUNITY
Start: 2018-04-06 | End: 2019-02-07

## 2018-05-03 ASSESSMENT — ENCOUNTER SYMPTOMS
RESPIRATORY NEGATIVE: 1
EYES NEGATIVE: 1
GASTROINTESTINAL NEGATIVE: 1

## 2018-06-05 ENCOUNTER — OFFICE VISIT (OUTPATIENT)
Dept: PULMONOLOGY | Age: 43
End: 2018-06-05

## 2018-06-05 VITALS
DIASTOLIC BLOOD PRESSURE: 68 MMHG | RESPIRATION RATE: 16 BRPM | WEIGHT: 250 LBS | BODY MASS INDEX: 41.65 KG/M2 | TEMPERATURE: 97.5 F | HEART RATE: 64 BPM | OXYGEN SATURATION: 98 % | SYSTOLIC BLOOD PRESSURE: 96 MMHG | HEIGHT: 65 IN

## 2018-06-05 DIAGNOSIS — I27.20 PULMONARY HTN (HCC): ICD-10-CM

## 2018-06-05 DIAGNOSIS — G47.33 MILD OBSTRUCTIVE SLEEP APNEA: Primary | ICD-10-CM

## 2018-06-05 DIAGNOSIS — J44.9 MODERATE COPD (CHRONIC OBSTRUCTIVE PULMONARY DISEASE) (HCC): ICD-10-CM

## 2018-06-05 DIAGNOSIS — R60.0 LEG EDEMA: ICD-10-CM

## 2018-06-05 DIAGNOSIS — Z78.9 INTOLERANCE OF CONTINUOUS POSITIVE AIRWAY PRESSURE (CPAP) VENTILATION: ICD-10-CM

## 2018-06-05 PROCEDURE — G8427 DOCREV CUR MEDS BY ELIG CLIN: HCPCS | Performed by: INTERNAL MEDICINE

## 2018-06-05 PROCEDURE — 1036F TOBACCO NON-USER: CPT | Performed by: INTERNAL MEDICINE

## 2018-06-05 PROCEDURE — 99214 OFFICE O/P EST MOD 30 MIN: CPT | Performed by: INTERNAL MEDICINE

## 2018-06-05 PROCEDURE — G8926 SPIRO NO PERF OR DOC: HCPCS | Performed by: INTERNAL MEDICINE

## 2018-06-05 PROCEDURE — G8417 CALC BMI ABV UP PARAM F/U: HCPCS | Performed by: INTERNAL MEDICINE

## 2018-06-05 PROCEDURE — 3023F SPIROM DOC REV: CPT | Performed by: INTERNAL MEDICINE

## 2018-06-05 ASSESSMENT — SLEEP AND FATIGUE QUESTIONNAIRES
HOW LIKELY ARE YOU TO NOD OFF OR FALL ASLEEP WHILE WATCHING TV: 0
HOW LIKELY ARE YOU TO NOD OFF OR FALL ASLEEP WHILE SITTING AND TALKING TO SOMEONE: 0
HOW LIKELY ARE YOU TO NOD OFF OR FALL ASLEEP WHILE SITTING AND READING: 3
HOW LIKELY ARE YOU TO NOD OFF OR FALL ASLEEP WHEN YOU ARE A PASSENGER IN A CAR FOR AN HOUR WITHOUT A BREAK: 0
HOW LIKELY ARE YOU TO NOD OFF OR FALL ASLEEP WHILE SITTING QUIETLY AFTER LUNCH WITHOUT ALCOHOL: 0
HOW LIKELY ARE YOU TO NOD OFF OR FALL ASLEEP WHILE SITTING INACTIVE IN A PUBLIC PLACE: 0
HOW LIKELY ARE YOU TO NOD OFF OR FALL ASLEEP IN A CAR, WHILE STOPPED FOR A FEW MINUTES IN TRAFFIC: 0
ESS TOTAL SCORE: 6
HOW LIKELY ARE YOU TO NOD OFF OR FALL ASLEEP WHILE LYING DOWN TO REST IN THE AFTERNOON WHEN CIRCUMSTANCES PERMIT: 3
NECK CIRCUMFERENCE (INCHES): 14.75

## 2018-06-06 ENCOUNTER — HOSPITAL ENCOUNTER (OUTPATIENT)
Dept: VASCULAR LAB | Age: 43
Discharge: HOME | End: 2018-06-06
Attending: INTERNAL MEDICINE | Admitting: INTERNAL MEDICINE

## 2018-06-06 ENCOUNTER — TELEPHONE (OUTPATIENT)
Dept: PULMONOLOGY | Age: 43
End: 2018-06-06

## 2018-06-06 DIAGNOSIS — R60.0 LOCALIZED EDEMA: ICD-10-CM

## 2018-06-26 ENCOUNTER — TELEPHONE (OUTPATIENT)
Dept: ORTHOPEDIC SURGERY | Age: 43
End: 2018-06-26

## 2018-07-12 ENCOUNTER — HOSPITAL ENCOUNTER (OUTPATIENT)
Dept: SLEEP MEDICINE | Age: 43
Discharge: HOME | End: 2018-07-13
Attending: INTERNAL MEDICINE | Admitting: INTERNAL MEDICINE

## 2018-07-12 DIAGNOSIS — Z78.9 INTOLERANCE OF CONTINUOUS POSITIVE AIRWAY PRESSURE (CPAP) VENTILATION: ICD-10-CM

## 2018-07-12 DIAGNOSIS — G47.33 MILD OBSTRUCTIVE SLEEP APNEA: ICD-10-CM

## 2018-07-13 ENCOUNTER — TELEPHONE (OUTPATIENT)
Dept: PULMONOLOGY | Age: 43
End: 2018-07-13

## 2018-07-13 NOTE — TELEPHONE ENCOUNTER
Patient called stating that during sleep study 7/12/18 she  was placed on a Bipap machine and now her lungs hurt. Denies SOB, Wheezing, or any other symptoms. Please advise. Assessment: 6/5/18      · Mild MARINA. CPAP 10 cmH2O. Poor compliance upon review today  · Intolerance to CPAP    · Led edema with mild calf tenderness- rule out DVT and Xolair failure  · Moderate COPD   · 28 pack year smoking - 12/2018  · Bilateral pulmonary embolism 12/29/2017-Likely provoked by hormonal therapy   · Severe pulmonary hypertension - likely due to acute PE. Sleep apnea might be contributing.  Much improved on repeat - normal PAP          Plan:       · Full night BiPAP titration   · Lower extremity Doppler rule out DVT  · Sleep hygiene  · Avoid sedatives, alcohol and caffeinated drinks at bed time  · No driving motorized vehicles or operating heavy machinery while fatigue, drowsy or sleepy  · Albuterol 2 puffs Q4-6 hrs PRN  · Advised to continue with smoking cessation  · Anticoagulation per PCP- consider hypercoagulable workup once off Xarelto    · Off hormonal therapy

## 2018-07-13 NOTE — TELEPHONE ENCOUNTER
Patient returned call and was informed of  message. Patient did not understand how the Bipap did not cause the pain in her lungs, since it started after the Bipap was put on, but Patient did say that she would go to ER with worsening symptoms.

## 2018-07-16 ENCOUNTER — OFFICE VISIT (OUTPATIENT)
Dept: FAMILY MEDICINE CLINIC | Age: 43
End: 2018-07-16

## 2018-07-16 VITALS
BODY MASS INDEX: 41.42 KG/M2 | DIASTOLIC BLOOD PRESSURE: 62 MMHG | OXYGEN SATURATION: 98 % | WEIGHT: 248.6 LBS | HEIGHT: 65 IN | SYSTOLIC BLOOD PRESSURE: 116 MMHG | HEART RATE: 58 BPM

## 2018-07-16 DIAGNOSIS — I26.99 OTHER ACUTE PULMONARY EMBOLISM WITHOUT ACUTE COR PULMONALE (HCC): ICD-10-CM

## 2018-07-16 DIAGNOSIS — G43.009 MIGRAINE WITHOUT AURA AND WITHOUT STATUS MIGRAINOSUS, NOT INTRACTABLE: ICD-10-CM

## 2018-07-16 DIAGNOSIS — G89.4 CHRONIC PAIN SYNDROME: ICD-10-CM

## 2018-07-16 DIAGNOSIS — E03.4 HYPOTHYROIDISM DUE TO ACQUIRED ATROPHY OF THYROID: Primary | ICD-10-CM

## 2018-07-16 DIAGNOSIS — F41.8 DEPRESSION WITH ANXIETY: ICD-10-CM

## 2018-07-16 DIAGNOSIS — M25.50 ARTHRALGIA, UNSPECIFIED JOINT: ICD-10-CM

## 2018-07-16 PROBLEM — G47.33 OSA TREATED WITH BIPAP: Status: ACTIVE | Noted: 2018-07-16

## 2018-07-16 PROBLEM — E66.9 OBESITY (BMI 30-39.9): Status: RESOLVED | Noted: 2017-07-24 | Resolved: 2018-07-16

## 2018-07-16 PROCEDURE — G8427 DOCREV CUR MEDS BY ELIG CLIN: HCPCS | Performed by: FAMILY MEDICINE

## 2018-07-16 PROCEDURE — 99203 OFFICE O/P NEW LOW 30 MIN: CPT | Performed by: FAMILY MEDICINE

## 2018-07-16 PROCEDURE — 36415 COLL VENOUS BLD VENIPUNCTURE: CPT | Performed by: FAMILY MEDICINE

## 2018-07-16 PROCEDURE — G8417 CALC BMI ABV UP PARAM F/U: HCPCS | Performed by: FAMILY MEDICINE

## 2018-07-16 PROCEDURE — 1036F TOBACCO NON-USER: CPT | Performed by: FAMILY MEDICINE

## 2018-07-16 RX ORDER — MEMANTINE HYDROCHLORIDE 10 MG/1
10 TABLET ORAL DAILY
COMMUNITY
Start: 2018-06-19 | End: 2019-04-12 | Stop reason: ALTCHOICE

## 2018-07-16 ASSESSMENT — ENCOUNTER SYMPTOMS
WHEEZING: 0
DIARRHEA: 0
VOMITING: 0
CONSTIPATION: 0
EYE DISCHARGE: 0
CHEST TIGHTNESS: 0
NAUSEA: 0
SHORTNESS OF BREATH: 0
RHINORRHEA: 0
BLOOD IN STOOL: 0
SINUS PRESSURE: 0
COUGH: 0
EYE REDNESS: 0
EYE PAIN: 0
ABDOMINAL PAIN: 0

## 2018-07-16 ASSESSMENT — PATIENT HEALTH QUESTIONNAIRE - PHQ9
7. TROUBLE CONCENTRATING ON THINGS, SUCH AS READING THE NEWSPAPER OR WATCHING TELEVISION: 3
2. FEELING DOWN, DEPRESSED OR HOPELESS: 2
3. TROUBLE FALLING OR STAYING ASLEEP: 3
1. LITTLE INTEREST OR PLEASURE IN DOING THINGS: 3
SUM OF ALL RESPONSES TO PHQ9 QUESTIONS 1 & 2: 5
SUM OF ALL RESPONSES TO PHQ QUESTIONS 1-9: 19
10. IF YOU CHECKED OFF ANY PROBLEMS, HOW DIFFICULT HAVE THESE PROBLEMS MADE IT FOR YOU TO DO YOUR WORK, TAKE CARE OF THINGS AT HOME, OR GET ALONG WITH OTHER PEOPLE: 2
6. FEELING BAD ABOUT YOURSELF - OR THAT YOU ARE A FAILURE OR HAVE LET YOURSELF OR YOUR FAMILY DOWN: 3
4. FEELING TIRED OR HAVING LITTLE ENERGY: 2
5. POOR APPETITE OR OVEREATING: 2
8. MOVING OR SPEAKING SO SLOWLY THAT OTHER PEOPLE COULD HAVE NOTICED. OR THE OPPOSITE, BEING SO FIGETY OR RESTLESS THAT YOU HAVE BEEN MOVING AROUND A LOT MORE THAN USUAL: 1
9. THOUGHTS THAT YOU WOULD BE BETTER OFF DEAD, OR OF HURTING YOURSELF: 0

## 2018-07-16 NOTE — PROGRESS NOTES
Subjective:      Patient ID: Alex Guzmán is a 37 y.o. female. HPI  Chief Complaint   Patient presents with    New Patient     Patient is here to establish care with Dr. Hector Cali as a new patient. She says a nurse practitioner at her pain managment office suggested to her she may have lupus.  Discuss Medications     She is still on xarelto from a PE back in December of 2017 and does not know how long she is to be on this. Here to establish care. Non smoker. Switched PCP due to insurance PMH includes hypothyroid, migraines, chronic pain syndrome, anxiety/depression and recent PE. Sees pain management and psychiatry. Was diagnosed with PE in dec 2017. Has been on xarelto since. Not sure when she will be able to stop. Has not seen hematology. Unclear what cause of the PE is.  Has seen her pain specialist and concerned may have lupus- has not been tested  Alex Guzmán is a 37 y.o. female with the following history as recorded in EpicCare:  Patient Active Problem List    Diagnosis Date Noted    Pulmonary hypertension      Priority: High    MARINA treated with BiPAP 07/16/2018    Migraine without aura 07/16/2018    Moderate COPD (chronic obstructive pulmonary disease) ([facility] [address] 06/05/2018    Acute massive pulmonary embolism ([address] 12/28/2017    Chondromalacia of patellofemoral joint 07/26/2017    Patellar maltracking 07/26/2017    Acute pancreatitis 05/16/2017    Hiatal hernia 05/12/2017    Mitchell's neuroma of right foot 02/10/2017    Pre-op exam 12/23/2016    Abnormal EKG 12/23/2016    Neuroma digital nerve 12/16/2016    Chronic GERD 11/17/2016    Morbid obesity with BMI of 40.0-44.9, adult ([address] 10/12/2016    Primary osteoarthritis of right knee 02/10/2016    Chronic nausea 02/12/2014    Breast nodule 10/02/2013    Pelvic pain in female 04/25/2013    Insomnia 07/30/2012    IBS (irritable bowel syndrome) 07/26/2012    Hypothyroidism 08/02/2011    Vitamin D deficiency 08/02/2011    UTERUS      FOOT SURGERY      neuroma and lesion excision    FOOT SURGERY Right 01/30/2016    HEMORRHOID SURGERY      HERNIA REPAIR      bilateral inguinal X3    HYSTERECTOMY      LAPAROSCOPY      OTHER SURGICAL HISTORY      Fecal transplant    UPPER GASTROINTESTINAL ENDOSCOPY  05/12/2017     Family History   Problem Relation Age of Onset    High Blood Pressure Mother     Elevated Lipids Mother     Diabetes Mother     Other Mother         Melanoma    Depression Mother     Anxiety Disorder Mother     Diabetes Father     Heart Disease Father     Anxiety Disorder Father     Depression Father     Other Father         PTSD    Cancer Father         Throat      Social History   Substance Use Topics    Smoking status: Former Smoker     Years: 28.00     Types: Cigarettes    Smokeless tobacco: Never Used    Alcohol use No      Comment: occasionally     Vitals:    07/16/18 1524   Weight: 248 lb 9.6 oz (112.8 kg)   Height: 5' 5\" (1.651 m)     Body mass index is 41.37 kg/m². Wt Readings from Last 3 Encounters:   07/16/18 248 lb 9.6 oz (112.8 kg)   06/05/18 250 lb (113.4 kg)   06/05/18 250 lb (113.4 kg)     BP Readings from Last 3 Encounters:   06/05/18 104/60   06/05/18 96/68   05/03/18 106/70        Review of Systems   Constitutional: Negative for chills, fatigue, fever and unexpected weight change. HENT: Negative for ear discharge, ear pain, hearing loss, rhinorrhea, sinus pressure and tinnitus. Eyes: Negative for pain, discharge, redness and visual disturbance. Respiratory: Negative for cough, chest tightness, shortness of breath and wheezing. Cardiovascular: Negative for chest pain and palpitations. Gastrointestinal: Negative for abdominal pain, blood in stool, constipation, diarrhea, nausea and vomiting. Genitourinary: Negative for difficulty urinating, dysuria and hematuria. Musculoskeletal: Positive for arthralgias and back pain. Skin: Negative for color change and rash. Neurological: Negative for dizziness, seizures, syncope and headaches. Psychiatric/Behavioral: Negative for dysphoric mood and sleep disturbance. The patient is nervous/anxious. Objective:   Physical Exam   Constitutional: She is oriented to person, place, and time. She appears well-developed and well-nourished. No distress. HENT:   Head: Normocephalic. Right Ear: External ear normal.   Nose: Nose normal.   Mouth/Throat: Oropharynx is clear and moist. No oropharyngeal exudate. Eyes: Conjunctivae and EOM are normal. Pupils are equal, round, and reactive to light. No scleral icterus. Neck: Neck supple. No thyromegaly present. Cardiovascular: Normal rate, regular rhythm, normal heart sounds and intact distal pulses. No murmur heard. Pulmonary/Chest: Effort normal and breath sounds normal. She has no wheezes. Abdominal: Soft. Bowel sounds are normal. She exhibits no distension. There is no tenderness. There is no rebound and no guarding. Musculoskeletal: Normal range of motion. She exhibits no edema or tenderness. Lymphadenopathy:     She has no cervical adenopathy. Neurological: She is alert and oriented to person, place, and time. No cranial nerve deficit. Coordination normal.   Skin: Skin is warm and dry. No erythema. Psychiatric: She has a normal mood and affect.  Her behavior is normal. Judgment and thought content normal.       Assessment:      Hypothyroid- check tsh  PE- on xarelto, has not seen hematology- referral given for   Arthralgia- check asia,tsh  Migraines- sees  neurology  Lumbar DDD- see pain management management  Anxiety- sees psych      Plan:       Records from previous PCP  Orders Placed This Encounter   Procedures    TSH with Reflex    ASIA     rto 3 months

## 2018-07-16 NOTE — PATIENT INSTRUCTIONS

## 2018-07-17 LAB
ANA INTERPRETATION: NORMAL
ANTI-NUCLEAR ANTIBODY (ANA): NEGATIVE
TSH REFLEX: 0.92 UIU/ML (ref 0.27–4.2)

## 2018-07-18 DIAGNOSIS — G47.33 OSA (OBSTRUCTIVE SLEEP APNEA): Primary | ICD-10-CM

## 2018-07-23 PROBLEM — G89.4 CHRONIC PAIN SYNDROME: Status: ACTIVE | Noted: 2018-07-23

## 2018-07-29 ASSESSMENT — ENCOUNTER SYMPTOMS
COLOR CHANGE: 0
BACK PAIN: 1

## 2018-08-03 ENCOUNTER — HOSPITAL ENCOUNTER (OUTPATIENT)
Dept: MRI IMAGING | Age: 43
Discharge: HOME | End: 2018-08-03
Payer: COMMERCIAL

## 2018-08-03 DIAGNOSIS — R93.7 MUSCULOSKELETAL SYSTEM IMAGING ABNORMALITY: ICD-10-CM

## 2018-08-03 PROCEDURE — 6360000004 HC RX CONTRAST MEDICATION: Performed by: NURSE PRACTITIONER

## 2018-08-03 PROCEDURE — A9579 GAD-BASE MR CONTRAST NOS,1ML: HCPCS | Performed by: NURSE PRACTITIONER

## 2018-08-03 PROCEDURE — 70553 MRI BRAIN STEM W/O & W/DYE: CPT

## 2018-08-03 RX ADMIN — GADOTERIDOL 23 ML: 279.3 INJECTION, SOLUTION INTRAVENOUS at 16:39

## 2018-08-06 ENCOUNTER — HOSPITAL ENCOUNTER (OUTPATIENT)
Dept: MRI IMAGING | Age: 43
Discharge: HOME | End: 2018-08-06
Payer: COMMERCIAL

## 2018-08-06 DIAGNOSIS — R93.7 MUSCULOSKELETAL SYSTEM IMAGING ABNORMALITY: ICD-10-CM

## 2018-08-06 PROCEDURE — 6360000004 HC RX CONTRAST MEDICATION: Performed by: NURSE PRACTITIONER

## 2018-08-06 PROCEDURE — 72156 MRI NECK SPINE W/O & W/DYE: CPT

## 2018-08-06 PROCEDURE — A9579 GAD-BASE MR CONTRAST NOS,1ML: HCPCS | Performed by: NURSE PRACTITIONER

## 2018-08-06 RX ADMIN — GADOTERIDOL 23 ML: 279.3 INJECTION, SOLUTION INTRAVENOUS at 16:03

## 2018-08-28 ENCOUNTER — HOSPITAL ENCOUNTER (OUTPATIENT)
Dept: WOMENS IMAGING | Age: 43
Discharge: HOME | End: 2018-08-28
Payer: COMMERCIAL

## 2018-08-28 DIAGNOSIS — Z12.31 VISIT FOR SCREENING MAMMOGRAM: ICD-10-CM

## 2018-08-28 PROCEDURE — 77063 BREAST TOMOSYNTHESIS BI: CPT

## 2018-08-30 ENCOUNTER — TELEPHONE (OUTPATIENT)
Dept: FAMILY MEDICINE CLINIC | Age: 43
End: 2018-08-30

## 2018-09-05 ENCOUNTER — TELEPHONE (OUTPATIENT)
Dept: PULMONOLOGY | Age: 43
End: 2018-09-05

## 2018-09-05 DIAGNOSIS — G47.33 OSA (OBSTRUCTIVE SLEEP APNEA): Primary | ICD-10-CM

## 2018-09-05 NOTE — TELEPHONE ENCOUNTER
Order faxed to [facility]. [place] with [facility] will have to bring to office modem is not working. Can bring to [facility] by 2pm can fix there. Patient called with message left for patient to call back to office.

## 2018-09-06 ENCOUNTER — OFFICE VISIT (OUTPATIENT)
Dept: BARIATRICS/WEIGHT MGMT | Age: 43
End: 2018-09-06

## 2018-09-06 VITALS
BODY MASS INDEX: 42.82 KG/M2 | DIASTOLIC BLOOD PRESSURE: 70 MMHG | HEART RATE: 71 BPM | HEIGHT: 65 IN | SYSTOLIC BLOOD PRESSURE: 112 MMHG | WEIGHT: 257 LBS

## 2018-09-06 DIAGNOSIS — E66.01 MORBID OBESITY WITH BMI OF 40.0-44.9, ADULT (HCC): Primary | ICD-10-CM

## 2018-09-06 PROCEDURE — G8427 DOCREV CUR MEDS BY ELIG CLIN: HCPCS | Performed by: NURSE PRACTITIONER

## 2018-09-06 PROCEDURE — 1036F TOBACCO NON-USER: CPT | Performed by: NURSE PRACTITIONER

## 2018-09-06 PROCEDURE — G8417 CALC BMI ABV UP PARAM F/U: HCPCS | Performed by: NURSE PRACTITIONER

## 2018-09-06 PROCEDURE — 99213 OFFICE O/P EST LOW 20 MIN: CPT | Performed by: NURSE PRACTITIONER

## 2018-09-06 ASSESSMENT — ENCOUNTER SYMPTOMS
RESPIRATORY NEGATIVE: 1
GASTROINTESTINAL NEGATIVE: 1
EYES NEGATIVE: 1
BACK PAIN: 1

## 2018-09-06 NOTE — PROGRESS NOTES
tartrate (LOPRESSOR) 50 MG tablet, Take 50 mg by mouth daily, Disp: , Rfl:     hydrOXYzine (ATARAX) 25 MG tablet, Take 50 mg by mouth 3 times daily , Disp: , Rfl:     scopolamine (TRANSDERM-SCOP) transdermal patch, Place 1 patch onto the skin every 72 hours (Patient taking differently: Place 1 patch onto the skin every 72 hours as needed ), Disp: 4 patch, Rfl: 3    promethazine (PHENERGAN) 25 MG tablet, TAKE 1 TABLET BY MOUTH EVERY 8 HOURS AS NEEDED FOR NAUSEA, Disp: 60 tablet, Rfl: 6    pregabalin (LYRICA) 25 MG capsule, Take 150 mg by mouth 2 times daily , Disp: , Rfl:     baclofen (LIORESAL) 10 MG tablet, Take 20 mg by mouth 3 times daily , Disp: , Rfl:     tamsulosin (FLOMAX) 0.4 MG capsule, Take 0.4 mg by mouth daily, Disp: , Rfl:     topiramate (TOPAMAX) 25 MG tablet, Take 100 mg by mouth 2 times daily , Disp: , Rfl:     diphenhydrAMINE (BENADRYL) 50 MG capsule, Take 50 mg by mouth nightly as needed for Allergies. , Disp: , Rfl:     Patient Active Problem List   Diagnosis    Depression with anxiety    Hypothyroidism    Vitamin D deficiency    IBS (irritable bowel syndrome)    Arthralgia    Morbid obesity with BMI of 40.0-44.9, adult ([address]    Chronic GERD    Neuroma digital nerve    Mitchell's neuroma of right foot    Hiatal hernia    Acute pancreatitis    Chondromalacia of patellofemoral joint    Patellar maltracking    Pulmonary embolus ([address]    Acute massive pulmonary embolism (HCC)    Pulmonary hypertension (HCC)    Moderate COPD (chronic obstructive pulmonary disease) ([address]    MARINA treated with BiPAP    Migraine without aura    Chronic pain syndrome       Review of Systems   Constitutional: Negative. HENT: Negative. Eyes: Negative. Respiratory: Negative. Cardiovascular: Negative. Gastrointestinal: Negative. Musculoskeletal: Positive for back pain. Chronic, generalized pain   Skin: Negative. Neurological: Negative.         Physical Exam   Constitutional: She is oriented to person, place, and time. She appears well-developed and well-nourished. HENT:   Head: Normocephalic and atraumatic. Eyes: Pupils are equal, round, and reactive to light. Neck: Normal range of motion. Cardiovascular: Normal rate. Pulmonary/Chest: Effort normal.   Musculoskeletal: Normal range of motion. Neurological: She is alert and oriented to person, place, and time. Psychiatric: She has a normal mood and affect. Her behavior is normal. Judgment and thought content normal.       Office Visit on 07/16/2018   Component Date Value Ref Range Status    TSH 07/16/2018 0.92  0.27 - 4.20 uIU/mL Final    DACIA 07/16/2018 Negative  Negative Final    DACIA Interpretation 07/16/2018 see below   Final    Comment: DACIA specimens are screened using enzyme-linked immunosorbent  assay (EMILY) methodology. All MEILY results reported as  positive are further tested by indirect fluorescent assay  (IFA) using Hep-2 substrate with an IgG-specific conjugate. The DACIA EMILY screen is designed to detect antibodies against  dsDNA, histone, SS-A (Ro), SS-B (La), Fenton (Sm), SmRNP,  Scl-70, Francisca-1, and centromere, along with sera positive for  IFA-Hep-2 ANAs. Assessment and Plan:    ICD-10-CM ICD-9-CM    1. Morbid obesity with BMI of 40.0-44.9, adult ([facility] E66.01 278.01 Discussed taking a break from the [facility] program and returning when she feels she can better focus on dietary change and her weight loss. She is agreeable to this plan. She does feel she will be more successful with her weight loss once her other medical conditions are more stabilized. She will try to maintain healthy food choices and stay as active as possible in the meantime.        Z68.41 V85.41        Nutrition plan: [] LCHF/Ketogenic   [] Modified low-calorie diet (low carb/low-amos)               [] Low-calorie diet    [x]Maintenance       []Other    Exercise: []Cardio     []Resistance/strength training                       []ACSM recommendations (150 minutes/week in active weight loss)                              Behavior: [x]Motivational interviewing performed    [] Referral for counseling                         []Discussed strategies to overcome habits/challenges for focus         [] Stress management   [] Stimulus control                    [] Sleep hygiene    Reviewed:  [x] Nutrition and the importance of regular protein intake  [x] Hidden carbohydrate sources  [x] Alcohol use  [x] Tobacco use   [x] Importance of exercise and reducing sedentary time      Total weight loss: +14.9 pounds      No orders of the defined types were placed in this encounter.       Follow up when she is ready to rejoin the [facility] program

## 2018-10-01 ENCOUNTER — OFFICE VISIT (OUTPATIENT)
Dept: FAMILY MEDICINE CLINIC | Age: 43
End: 2018-10-01
Payer: COMMERCIAL

## 2018-10-01 VITALS
RESPIRATION RATE: 20 BRPM | TEMPERATURE: 97.5 F | DIASTOLIC BLOOD PRESSURE: 64 MMHG | BODY MASS INDEX: 41.09 KG/M2 | WEIGHT: 246.6 LBS | HEART RATE: 74 BPM | HEIGHT: 65 IN | SYSTOLIC BLOOD PRESSURE: 110 MMHG | OXYGEN SATURATION: 97 %

## 2018-10-01 DIAGNOSIS — Z01.818 PREOP EXAMINATION: Primary | ICD-10-CM

## 2018-10-01 DIAGNOSIS — N32.81 OAB (OVERACTIVE BLADDER): ICD-10-CM

## 2018-10-01 PROCEDURE — G8484 FLU IMMUNIZE NO ADMIN: HCPCS | Performed by: FAMILY MEDICINE

## 2018-10-01 PROCEDURE — G8417 CALC BMI ABV UP PARAM F/U: HCPCS | Performed by: FAMILY MEDICINE

## 2018-10-01 PROCEDURE — 99242 OFF/OP CONSLTJ NEW/EST SF 20: CPT | Performed by: FAMILY MEDICINE

## 2018-10-01 PROCEDURE — G8427 DOCREV CUR MEDS BY ELIG CLIN: HCPCS | Performed by: FAMILY MEDICINE

## 2018-10-01 RX ORDER — ONDANSETRON 4 MG/1
4 TABLET, ORALLY DISINTEGRATING ORAL EVERY 8 HOURS PRN
Qty: 15 TABLET | Refills: 1 | Status: CANCELLED | OUTPATIENT
Start: 2018-10-01

## 2018-10-01 RX ORDER — PROMETHAZINE HYDROCHLORIDE 25 MG/1
TABLET ORAL
Qty: 60 TABLET | Refills: 1 | Status: CANCELLED | OUTPATIENT
Start: 2018-10-01

## 2018-10-01 ASSESSMENT — ENCOUNTER SYMPTOMS
COLOR CHANGE: 0
RHINORRHEA: 0
COUGH: 0
VOMITING: 0
EYE PAIN: 0
WHEEZING: 0
CHEST TIGHTNESS: 0
SINUS PRESSURE: 0
DIARRHEA: 0
EYE DISCHARGE: 0
CONSTIPATION: 0
EYE REDNESS: 0
SHORTNESS OF BREATH: 0
NAUSEA: 0
ABDOMINAL PAIN: 0
BLOOD IN STOOL: 0

## 2018-10-08 ENCOUNTER — ANESTHESIA EVENT (OUTPATIENT)
Dept: OPERATING ROOM | Age: 43
End: 2018-10-08
Payer: COMMERCIAL

## 2018-10-10 ENCOUNTER — ANESTHESIA (OUTPATIENT)
Dept: OPERATING ROOM | Age: 43
End: 2018-10-10
Payer: COMMERCIAL

## 2018-10-10 ENCOUNTER — HOSPITAL ENCOUNTER (OUTPATIENT)
Age: 43
Setting detail: OUTPATIENT SURGERY
Discharge: HOME | End: 2018-10-10
Attending: UROLOGY | Admitting: UROLOGY
Payer: COMMERCIAL

## 2018-10-10 VITALS
HEART RATE: 53 BPM | SYSTOLIC BLOOD PRESSURE: 132 MMHG | RESPIRATION RATE: 16 BRPM | OXYGEN SATURATION: 100 % | DIASTOLIC BLOOD PRESSURE: 79 MMHG | BODY MASS INDEX: 40.98 KG/M2 | HEIGHT: 65 IN | WEIGHT: 246 LBS | TEMPERATURE: 98 F

## 2018-10-10 VITALS — OXYGEN SATURATION: 97 % | SYSTOLIC BLOOD PRESSURE: 108 MMHG | DIASTOLIC BLOOD PRESSURE: 49 MMHG

## 2018-10-10 PROCEDURE — 3700000000 HC ANESTHESIA ATTENDED CARE: Performed by: UROLOGY

## 2018-10-10 PROCEDURE — 7100000011 HC PHASE II RECOVERY - ADDTL 15 MIN: Performed by: UROLOGY

## 2018-10-10 PROCEDURE — 6360000002 HC RX W HCPCS: Performed by: NURSE ANESTHETIST, CERTIFIED REGISTERED

## 2018-10-10 PROCEDURE — 7100000010 HC PHASE II RECOVERY - FIRST 15 MIN: Performed by: UROLOGY

## 2018-10-10 PROCEDURE — 3700000001 HC ADD 15 MINUTES (ANESTHESIA): Performed by: UROLOGY

## 2018-10-10 PROCEDURE — 2709999900 HC NON-CHARGEABLE SUPPLY: Performed by: UROLOGY

## 2018-10-10 PROCEDURE — 6370000000 HC RX 637 (ALT 250 FOR IP): Performed by: UROLOGY

## 2018-10-10 PROCEDURE — 3600000004 HC SURGERY LEVEL 4 BASE: Performed by: UROLOGY

## 2018-10-10 PROCEDURE — 6360000002 HC RX W HCPCS: Performed by: UROLOGY

## 2018-10-10 PROCEDURE — 2580000003 HC RX 258: Performed by: UROLOGY

## 2018-10-10 PROCEDURE — 3600000014 HC SURGERY LEVEL 4 ADDTL 15MIN: Performed by: UROLOGY

## 2018-10-10 RX ORDER — ONDANSETRON 2 MG/ML
4 INJECTION INTRAMUSCULAR; INTRAVENOUS EVERY 30 MIN PRN
Status: DISCONTINUED | OUTPATIENT
Start: 2018-10-10 | End: 2018-10-10 | Stop reason: HOSPADM

## 2018-10-10 RX ORDER — NITROFURANTOIN 25; 75 MG/1; MG/1
100 CAPSULE ORAL 2 TIMES DAILY
Qty: 8 CAPSULE | Refills: 0 | Status: SHIPPED | OUTPATIENT
Start: 2018-10-10 | End: 2018-10-14

## 2018-10-10 RX ORDER — PHENAZOPYRIDINE HYDROCHLORIDE 200 MG/1
200 TABLET, FILM COATED ORAL 3 TIMES DAILY PRN
Qty: 15 TABLET | Refills: 0 | Status: SHIPPED | OUTPATIENT
Start: 2018-10-10 | End: 2018-10-15

## 2018-10-10 RX ORDER — LIDOCAINE HYDROCHLORIDE 10 MG/ML
1 INJECTION, SOLUTION EPIDURAL; INFILTRATION; INTRACAUDAL; PERINEURAL
Status: DISCONTINUED | OUTPATIENT
Start: 2018-10-10 | End: 2018-10-10 | Stop reason: HOSPADM

## 2018-10-10 RX ORDER — SODIUM CHLORIDE, SODIUM LACTATE, POTASSIUM CHLORIDE, CALCIUM CHLORIDE 600; 310; 30; 20 MG/100ML; MG/100ML; MG/100ML; MG/100ML
INJECTION, SOLUTION INTRAVENOUS CONTINUOUS
Status: DISCONTINUED | OUTPATIENT
Start: 2018-10-10 | End: 2018-10-10 | Stop reason: HOSPADM

## 2018-10-10 RX ORDER — MEPERIDINE HYDROCHLORIDE 25 MG/ML
12.5 INJECTION INTRAMUSCULAR; INTRAVENOUS; SUBCUTANEOUS EVERY 5 MIN PRN
Status: DISCONTINUED | OUTPATIENT
Start: 2018-10-10 | End: 2018-10-10 | Stop reason: HOSPADM

## 2018-10-10 RX ORDER — HYDRALAZINE HYDROCHLORIDE 20 MG/ML
5 INJECTION INTRAMUSCULAR; INTRAVENOUS EVERY 30 MIN PRN
Status: DISCONTINUED | OUTPATIENT
Start: 2018-10-10 | End: 2018-10-10 | Stop reason: HOSPADM

## 2018-10-10 RX ORDER — LABETALOL HYDROCHLORIDE 5 MG/ML
5 INJECTION, SOLUTION INTRAVENOUS
Status: DISCONTINUED | OUTPATIENT
Start: 2018-10-10 | End: 2018-10-10 | Stop reason: HOSPADM

## 2018-10-10 RX ORDER — DIPHENHYDRAMINE HYDROCHLORIDE 50 MG/ML
6.25 INJECTION INTRAMUSCULAR; INTRAVENOUS
Status: DISCONTINUED | OUTPATIENT
Start: 2018-10-10 | End: 2018-10-10 | Stop reason: HOSPADM

## 2018-10-10 RX ORDER — FENTANYL CITRATE 50 UG/ML
INJECTION, SOLUTION INTRAMUSCULAR; INTRAVENOUS PRN
Status: DISCONTINUED | OUTPATIENT
Start: 2018-10-10 | End: 2018-10-10 | Stop reason: SDUPTHER

## 2018-10-10 RX ORDER — MAGNESIUM HYDROXIDE 1200 MG/15ML
LIQUID ORAL PRN
Status: DISCONTINUED | OUTPATIENT
Start: 2018-10-10 | End: 2018-10-10 | Stop reason: HOSPADM

## 2018-10-10 RX ORDER — PROPOFOL 10 MG/ML
INJECTION, EMULSION INTRAVENOUS PRN
Status: DISCONTINUED | OUTPATIENT
Start: 2018-10-10 | End: 2018-10-10 | Stop reason: SDUPTHER

## 2018-10-10 RX ADMIN — PROPOFOL 250 MG: 10 INJECTION, EMULSION INTRAVENOUS at 14:07

## 2018-10-10 RX ADMIN — FENTANYL CITRATE 100 MCG: 50 INJECTION INTRAMUSCULAR; INTRAVENOUS at 14:04

## 2018-10-10 RX ADMIN — Medication 2 G: at 13:58

## 2018-10-10 ASSESSMENT — PULMONARY FUNCTION TESTS
PIF_VALUE: 1

## 2018-10-10 ASSESSMENT — PAIN SCALES - GENERAL
PAINLEVEL_OUTOF10: 0
PAINLEVEL_OUTOF10: 0

## 2018-10-10 ASSESSMENT — PAIN - FUNCTIONAL ASSESSMENT: PAIN_FUNCTIONAL_ASSESSMENT: 0-10

## 2018-10-10 NOTE — BRIEF OP NOTE
Brief Postoperative Note  ______________________________________________________________    Patient: Bev Purvis  YOB: 1975  MRN: [id number]  Date of Procedure: 10/10/2018    Pre-Op Diagnosis: URETHRAL STRICTURE    Post-Op Diagnosis: Same       Procedure(s):  CYSTOSCOPY, URETHRAL DILATATION    Anesthesia: General    Surgeon(s):  Farideh Baker MD    Staff:  Scrub Person First: Dinora De La Paz     Estimated Blood Loss: * No values recorded between 10/10/2018  1:57 PM and 10/10/2018  2:12 PM * None    Complications: None    Specimens:   * No specimens in log *    Implants:  * No implants in log *      Drains:      Findings: urethral stenosis, no cancer    Gabi Mirza MD  Date: 10/10/2018  Time: 2:44 PM

## 2018-10-10 NOTE — PROGRESS NOTES
Pt is alert and oriented. Verbalized understanding of procedure, consent form signed, iv started, pt tolerated well. Will call family to bedside, call light within reach.
surgery. 14. If you have a Living Will and Durable Power of  for Healthcare, please bring in a copy. 13. Notify your Surgeon if you develop any illness between now and surgery  time, cough, cold, fever, sore throat, nausea, vomiting, etc.  Please notify your surgeon if you experience dizziness, shortness of breath or blurred vision between now & the time of your surgery   16. DO NOT shave your operative site 96 hours prior to surgery. For face & neck surgery, men may use an electric razor 48 hours prior to surgery. 17. Shower with _x__Antibacterial soap (x_chlorhexidine for total joint  Pt's) shower two times before surgery.(the morning of and the night before. 18. To provide excellent care visitors will be limited to one in the room at any given time.   Please call pre admission testing if you any further questions [phone] or [phone]

## 2018-10-10 NOTE — ANESTHESIA PRE PROCEDURE
[Chlorpheniramine-Pseudoeph]     Sudafed [Pseudoephedrine Hcl]     Sulfa Antibiotics     Ultram [Tramadol]      Can take percocet , morphine     Influenza Vaccines     Vancomycin Rash       Problem List:    Patient Active Problem List   Diagnosis Code    Depression with anxiety F41.8    Hypothyroidism E03.9    Vitamin D deficiency E55.9    IBS (irritable bowel syndrome) K58.9    Arthralgia M25.50    Morbid obesity with BMI of 40.0-44.9, adult ([facility]) E66.01, Z68.41    Chronic GERD K21.9    Neuroma digital nerve G57.60    Mitchell's neuroma of right foot G57.61    Hiatal hernia K44.9    Acute pancreatitis K85.90    Chondromalacia of patellofemoral joint M22.40    Patellar maltracking M22.8X9    Pulmonary embolus ([facility]) I26.99    Acute massive pulmonary embolism ([facility]) I26.99    Pulmonary hypertension ([facility]) I27.20    Moderate COPD (chronic obstructive pulmonary disease) ([facility]) J44.9    MARINA treated with BiPAP G47.33    Migraine without aura G43.009    Chronic pain syndrome G89.4       Past Medical History:        Diagnosis Date    Abdominal pain, other specified site 2013    Anemia     Anxiety     Chronic GERD 2016    Chronic nausea     Clostridium difficile diarrhea 2013    4 positive tests in     Depression     Fibromyalgia     GERD (gastroesophageal reflux disease)     Morbid obesity with BMI of 40.0-44.9, adult ([facility])     Neuroma digital nerve 2016    PONV (postoperative nausea and vomiting)     Primary osteoarthritis of right knee 2/10/2016    Sleep apnea     Thyroid disease     hypothyroidism       Past Surgical History:        Procedure Laterality Date    BUNIONECTOMY Right      SECTION      x 2    CHOLECYSTECTOMY      COLONOSCOPY  13    NOT COMPLETE    CYSTOSCOPY      DILATION AND CURETTAGE OF UTERUS      FOOT SURGERY      neuroma and lesion excision    FOOT SURGERY Right 2016    HEMORRHOID SURGERY      HERNIA REPAIR

## 2018-10-10 NOTE — ANESTHESIA POSTPROCEDURE EVALUATION
12                  06/05/2018 07:26 PM        CREATININE               0.9                 06/05/2018 07:26 PM        GLUCOSE                  104 (H)             06/05/2018 07:26 PM   COAGS  Lab Results       Component                Value               Date/Time                  PROTIME                  13.6 (H)            12/28/2017 12:43 PM        INR                      1.20 (H)            12/28/2017 12:43 PM        APTT                     38.7 (H)            12/31/2017 06:41 AM     Intake & Output: In: 600 (P.O.:50; I.V.:550)  Out: -     Nausea & Vomiting:  No    Level of Consciousness:  Awake    Pain Assessment:  Adequate analgesia    Anesthesia Complications:  No apparent anesthetic complications    SUMMARY      Vital signs stable  OK to discharge from Stage I post anesthesia care.   Care transferred from Anesthesiology department on discharge from perioperative area

## 2018-10-16 ENCOUNTER — TELEPHONE (OUTPATIENT)
Dept: ENDOCRINOLOGY | Age: 43
End: 2018-10-16

## 2018-10-18 ENCOUNTER — OFFICE VISIT (OUTPATIENT)
Dept: CARDIOLOGY CLINIC | Age: 43
End: 2018-10-18
Payer: COMMERCIAL

## 2018-10-18 VITALS
HEART RATE: 72 BPM | BODY MASS INDEX: 41.32 KG/M2 | SYSTOLIC BLOOD PRESSURE: 90 MMHG | OXYGEN SATURATION: 99 % | HEIGHT: 65 IN | WEIGHT: 248 LBS | DIASTOLIC BLOOD PRESSURE: 56 MMHG

## 2018-10-18 DIAGNOSIS — I27.20 PULMONARY HYPERTENSION (HCC): Primary | ICD-10-CM

## 2018-10-18 DIAGNOSIS — G47.33 OSA TREATED WITH BIPAP: ICD-10-CM

## 2018-10-18 PROCEDURE — G8417 CALC BMI ABV UP PARAM F/U: HCPCS | Performed by: INTERNAL MEDICINE

## 2018-10-18 PROCEDURE — G8427 DOCREV CUR MEDS BY ELIG CLIN: HCPCS | Performed by: INTERNAL MEDICINE

## 2018-10-18 PROCEDURE — 1036F TOBACCO NON-USER: CPT | Performed by: INTERNAL MEDICINE

## 2018-10-18 PROCEDURE — 99213 OFFICE O/P EST LOW 20 MIN: CPT | Performed by: INTERNAL MEDICINE

## 2018-10-18 PROCEDURE — G8484 FLU IMMUNIZE NO ADMIN: HCPCS | Performed by: INTERNAL MEDICINE

## 2018-10-18 RX ORDER — TIZANIDINE 4 MG/1
4 TABLET ORAL EVERY 8 HOURS PRN
COMMUNITY
End: 2019-07-12

## 2018-10-18 NOTE — PROGRESS NOTES
History:   has a past surgical history that includes  section; Cholecystectomy; hernia repair; laparoscopy; Hysterectomy; Colonoscopy (13); Dilation and curettage of uterus; other surgical history; Bunionectomy (Right); Cystoscopy; Foot surgery; Hemorrhoid surgery; Foot surgery (Right, 2016); Upper gastrointestinal endoscopy (2017); other surgical history (10/10/2018); and pr cystoscopy,dil urethral stricture (N/A, 10/10/2018). Social History:   reports that she quit smoking about 10 months ago. Her smoking use included Cigarettes. She has a 28.00 pack-year smoking history. She has never used smokeless tobacco. She reports that she does not drink alcohol or use drugs. Family History:  No evidence for sudden cardiac death or premature CAD    Home Medications:  Reviewed and are listed in nursing record. and/or listed below  Current Outpatient Prescriptions   Medication Sig Dispense Refill    tiZANidine (ZANAFLEX) 4 MG tablet Take 4 mg by mouth every 8 hours as needed      XARELTO 20 MG TABS tablet TAKE 1 TABLET BY MOUTH DAILY WITH BREAKFAST 30 tablet 5    VENTOLIN  (90 Base) MCG/ACT inhaler INHALE 2 PUFFS INTO THE LUNGS EVERY 6 HOURS AS NEEDED FOR WHEEZING OR SHORTNESS OF BREATH 1 Inhaler 6    memantine (NAMENDA) 10 MG tablet Take 10 mg by mouth daily      busPIRone (BUSPAR) 5 MG tablet 30mg TID  0    HYSINGLA ER 40 MG extended release tablet Take 1 tablet by mouth daily .   0    ranitidine (ZANTAC) 300 MG tablet Take 300 mg by mouth daily  3    VILAZODONE HCL 40 MG Tablet TK 1 T PO  QD  0    ondansetron (ZOFRAN ODT) 4 MG disintegrating tablet Take 1 tablet by mouth every 8 hours as needed for Nausea or Vomiting 15 tablet 0    DULoxetine (CYMBALTA) 60 MG extended release capsule TAKE 1 CAPSULE BY MOUTH TWICE DAILY 60 capsule 0    dicyclomine (BENTYL) 20 MG tablet TAKE 2 TABLETS BY MOUTH THREE TIMES DAILY (Patient taking differently: take 2 tablets four times daily) cooperative, no distress, appears stated age, obese   Head:  Normocephalic, atraumatic   Eyes:  PERRL, conjunctiva/corneas clear   Nose: Nares normal, no drainage or sinus tenderness   Throat: Lips, mucosa, and tongue normal   Neck: Supple, symmetrical, trachea midline, NL thyroid no carotid bruit or JVD   Lungs:   CTAB, respirations unlabored   Chest Wall:  No tenderness or deformity   Heart:  Regular rhythm and normal rate; S1, S2 are normal;   no murmur noted; no rub or gallop   Abdomen:   Soft, non-tender, +BS x 4, no masses, no organomegaly   Extremities: Extremities normal, atraumatic, no cyanosis or edema   Pulses: 2+ and symmetric   Skin: Skin color, texture, turgor normal, no rashes or lesions   Pysch: Normal mood and affect   Neurologic: Normal gross motor and sensory exam.         LABS   CBC:      Lab Results   Component Value Date    WBC 6.4 06/05/2018    RBC 4.62 06/05/2018    HGB 11.5 06/05/2018    HCT 34.9 06/05/2018    MCV 75.6 06/05/2018    RDW 15.3 06/05/2018     06/05/2018     CMP:  Lab Results   Component Value Date     06/05/2018    K 3.7 06/05/2018     06/05/2018    CO2 25 06/05/2018    BUN 12 06/05/2018    CREATININE 0.9 06/05/2018    GFRAA >60 06/05/2018    GFRAA >60 05/08/2013    AGRATIO 1.4 06/05/2018    LABGLOM >60 06/05/2018    GLUCOSE 104 06/05/2018    PROT 6.9 06/05/2018    PROT 6.6 11/14/2012    CALCIUM 8.9 06/05/2018    BILITOT 0.3 06/05/2018    ALKPHOS 82 06/05/2018    AST 14 06/05/2018    ALT 12 06/05/2018     PT/INR:   No results found for: PTINR  Liver:  No components found for: CHLPL  Lab Results   Component Value Date    ALT 12 06/05/2018    AST 14 (L) 06/05/2018    ALKPHOS 82 06/05/2018    BILITOT 0.3 06/05/2018     Lab Results   Component Value Date    LABA1C 5.3 05/17/2017     Lipids:         Lab Results   Component Value Date    TRIG 266 (H) 05/17/2017    TRIG 161 (H) 11/17/2016    TRIG 114 09/22/2015            Lab Results   Component Value Date    HDL 38

## 2018-10-18 NOTE — LETTER
 Chronic pain syndrome         Past Medical History:   has a past medical history of Abdominal pain, other specified site; Anemia; Anxiety; Chronic GERD; Chronic nausea; Clostridium difficile diarrhea; Depression; Fibromyalgia; GERD (gastroesophageal reflux disease); Morbid obesity with BMI of 40.0-44.9, adult ([facility] [address] Neuroma digital nerve; PONV (postoperative nausea and vomiting); Primary osteoarthritis of right knee; Sleep apnea; and Thyroid disease. Surgical History:   has a past surgical history that includes  section; Cholecystectomy; hernia repair; laparoscopy; Hysterectomy; Colonoscopy (13); Dilation and curettage of uterus; other surgical history; Bunionectomy (Right); Cystoscopy; Foot surgery; Hemorrhoid surgery; Foot surgery (Right, 2016); Upper gastrointestinal endoscopy (2017); other surgical history (10/10/2018); and pr cystoscopy,dil urethral stricture (N/A, 10/10/2018). Social History:   reports that she quit smoking about 10 months ago. Her smoking use included Cigarettes. She has a 28.00 pack-year smoking history. She has never used smokeless tobacco. She reports that she does not drink alcohol or use drugs. Family History:  No evidence for sudden cardiac death or premature CAD    Home Medications:  Reviewed and are listed in nursing record. and/or listed below  Current Outpatient Prescriptions   Medication Sig Dispense Refill    tiZANidine (ZANAFLEX) 4 MG tablet Take 4 mg by mouth every 8 hours as needed      XARELTO 20 MG TABS tablet TAKE 1 TABLET BY MOUTH DAILY WITH BREAKFAST 30 tablet 5    VENTOLIN  (90 Base) MCG/ACT inhaler INHALE 2 PUFFS INTO THE LUNGS EVERY 6 HOURS AS NEEDED FOR WHEEZING OR SHORTNESS OF BREATH 1 Inhaler 6    memantine (NAMENDA) 10 MG tablet Take 10 mg by mouth daily      busPIRone (BUSPAR) 5 MG tablet 30mg TID  0    HYSINGLA ER 40 MG extended release tablet Take 1 tablet by mouth daily .   0

## 2018-10-18 NOTE — COMMUNICATION BODY
180 tablet 3    RABEprazole (ACIPHEX) 20 MG tablet TAKE 2 TABLETS BY MOUTH DAILY (Patient taking differently: TAKE 1 TABLETS BY MOUTH BID) 60 tablet 3    calcium carbonate (TUMS) 500 MG chewable tablet Take 2 tablets by mouth as needed Indications: for reflux       cetirizine (ZYRTEC) 10 MG tablet TAKE 1 TABLET BY MOUTH DAILY 30 tablet 5    metoprolol tartrate (LOPRESSOR) 50 MG tablet Take 50 mg by mouth daily      hydrOXYzine (ATARAX) 25 MG tablet Take 50 mg by mouth 3 times daily       scopolamine (TRANSDERM-SCOP) transdermal patch Place 1 patch onto the skin every 72 hours (Patient taking differently: Place 1 patch onto the skin every 72 hours as needed ) 4 patch 3    pregabalin (LYRICA) 25 MG capsule Take 150 mg by mouth 2 times daily       tamsulosin (FLOMAX) 0.4 MG capsule Take 0.4 mg by mouth daily      topiramate (TOPAMAX) 25 MG tablet Take 100 mg by mouth       diphenhydrAMINE (BENADRYL) 50 MG capsule Take 50 mg by mouth nightly as needed for Allergies. No current facility-administered medications for this visit. Allergies:  Latex; Clindamycin; Serzone [nefazodone]; Butrans [buprenorphine]; Adhesive tape; Aripiprazole; Avelox [moxifloxacin hcl in nacl]; Flagyl [metronidazole]; Indocin [indometacin sodium]; Indomethacin; Ana Rosa Blase hcl]; Moxifloxacin; Neurontin [gabapentin]; Pseudephedrine plus [chlorpheniramine-pseudoeph]; Sudafed [pseudoephedrine hcl]; Sulfa antibiotics; Ultram [tramadol]; Influenza vaccines; and Vancomycin     Review of Systems:   A 14 point review of symptoms completed. Pertinent positives identified in the HPI, all other review of symptoms negative as below.     Objective:   PHYSICAL EXAM:    Vitals:    10/18/18 1304   BP: (!) 90/56   Pulse: 72   SpO2: 99%    Weight: 248 lb (112.5 kg)     Wt Readings from Last 3 Encounters:   10/18/18 248 lb (112.5 kg)   10/10/18 246 lb (111.6 kg)   10/01/18 246 lb 9.6 oz (111.9 kg)       General Appearance:  Alert, cooperative, no distress, appears stated age, obese   Head:  Normocephalic, atraumatic   Eyes:  PERRL, conjunctiva/corneas clear   Nose: Nares normal, no drainage or sinus tenderness   Throat: Lips, mucosa, and tongue normal   Neck: Supple, symmetrical, trachea midline, NL thyroid no carotid bruit or JVD   Lungs:   CTAB, respirations unlabored   Chest Wall:  No tenderness or deformity   Heart:  Regular rhythm and normal rate; S1, S2 are normal;   no murmur noted; no rub or gallop   Abdomen:   Soft, non-tender, +BS x 4, no masses, no organomegaly   Extremities: Extremities normal, atraumatic, no cyanosis or edema   Pulses: 2+ and symmetric   Skin: Skin color, texture, turgor normal, no rashes or lesions   Pysch: Normal mood and affect   Neurologic: Normal gross motor and sensory exam.         LABS   CBC:      Lab Results   Component Value Date    WBC 6.4 06/05/2018    RBC 4.62 06/05/2018    HGB 11.5 06/05/2018    HCT 34.9 06/05/2018    MCV 75.6 06/05/2018    RDW 15.3 06/05/2018     06/05/2018     CMP:  Lab Results   Component Value Date     06/05/2018    K 3.7 06/05/2018     06/05/2018    CO2 25 06/05/2018    BUN 12 06/05/2018    CREATININE 0.9 06/05/2018    GFRAA >60 06/05/2018    GFRAA >60 05/08/2013    AGRATIO 1.4 06/05/2018    LABGLOM >60 06/05/2018    GLUCOSE 104 06/05/2018    PROT 6.9 06/05/2018    PROT 6.6 11/14/2012    CALCIUM 8.9 06/05/2018    BILITOT 0.3 06/05/2018    ALKPHOS 82 06/05/2018    AST 14 06/05/2018    ALT 12 06/05/2018     PT/INR:   No results found for: PTINR  Liver:  No components found for: CHLPL  Lab Results   Component Value Date    ALT 12 06/05/2018    AST 14 (L) 06/05/2018    ALKPHOS 82 06/05/2018    BILITOT 0.3 06/05/2018     Lab Results   Component Value Date    LABA1C 5.3 05/17/2017     Lipids:         Lab Results   Component Value Date    TRIG 266 (H) 05/17/2017    TRIG 161 (H) 11/17/2016    TRIG 114 09/22/2015            Lab Results   Component Value Date    HDL 38

## 2018-10-26 ENCOUNTER — OFFICE VISIT (OUTPATIENT)
Dept: PULMONOLOGY | Age: 43
End: 2018-10-26
Payer: COMMERCIAL

## 2018-10-26 ENCOUNTER — TELEPHONE (OUTPATIENT)
Dept: PULMONOLOGY | Age: 43
End: 2018-10-26

## 2018-10-26 VITALS
WEIGHT: 250 LBS | BODY MASS INDEX: 41.65 KG/M2 | HEIGHT: 65 IN | SYSTOLIC BLOOD PRESSURE: 110 MMHG | RESPIRATION RATE: 16 BRPM | TEMPERATURE: 98.2 F | DIASTOLIC BLOOD PRESSURE: 72 MMHG | OXYGEN SATURATION: 97 % | HEART RATE: 63 BPM

## 2018-10-26 DIAGNOSIS — R53.83 OTHER FATIGUE: ICD-10-CM

## 2018-10-26 DIAGNOSIS — Z78.9 DIFFICULTY WITH BIPAP USE: ICD-10-CM

## 2018-10-26 DIAGNOSIS — Z72.821 POOR SLEEP HYGIENE: ICD-10-CM

## 2018-10-26 DIAGNOSIS — Z71.89 ENCOUNTER FOR BIPAP USE COUNSELING: ICD-10-CM

## 2018-10-26 DIAGNOSIS — E66.01 MORBID OBESITY WITH BMI OF 40.0-44.9, ADULT (HCC): ICD-10-CM

## 2018-10-26 DIAGNOSIS — G47.33 OSA TREATED WITH BIPAP: Primary | ICD-10-CM

## 2018-10-26 DIAGNOSIS — F51.04 PSYCHOPHYSIOLOGICAL INSOMNIA: ICD-10-CM

## 2018-10-26 PROCEDURE — G8484 FLU IMMUNIZE NO ADMIN: HCPCS | Performed by: NURSE PRACTITIONER

## 2018-10-26 PROCEDURE — G8427 DOCREV CUR MEDS BY ELIG CLIN: HCPCS | Performed by: NURSE PRACTITIONER

## 2018-10-26 PROCEDURE — 1036F TOBACCO NON-USER: CPT | Performed by: NURSE PRACTITIONER

## 2018-10-26 PROCEDURE — 99214 OFFICE O/P EST MOD 30 MIN: CPT | Performed by: NURSE PRACTITIONER

## 2018-10-26 PROCEDURE — G8417 CALC BMI ABV UP PARAM F/U: HCPCS | Performed by: NURSE PRACTITIONER

## 2018-10-26 ASSESSMENT — SLEEP AND FATIGUE QUESTIONNAIRES
HOW LIKELY ARE YOU TO NOD OFF OR FALL ASLEEP IN A CAR, WHILE STOPPED FOR A FEW MINUTES IN TRAFFIC: 0
HOW LIKELY ARE YOU TO NOD OFF OR FALL ASLEEP WHILE WATCHING TV: 0
HOW LIKELY ARE YOU TO NOD OFF OR FALL ASLEEP WHILE SITTING QUIETLY AFTER LUNCH WITHOUT ALCOHOL: 0
ESS TOTAL SCORE: 3
HOW LIKELY ARE YOU TO NOD OFF OR FALL ASLEEP WHILE SITTING AND TALKING TO SOMEONE: 0
HOW LIKELY ARE YOU TO NOD OFF OR FALL ASLEEP WHILE SITTING AND READING: 1
NECK CIRCUMFERENCE (INCHES): 14.75
HOW LIKELY ARE YOU TO NOD OFF OR FALL ASLEEP WHEN YOU ARE A PASSENGER IN A CAR FOR AN HOUR WITHOUT A BREAK: 0
HOW LIKELY ARE YOU TO NOD OFF OR FALL ASLEEP WHILE SITTING INACTIVE IN A PUBLIC PLACE: 0
HOW LIKELY ARE YOU TO NOD OFF OR FALL ASLEEP WHILE LYING DOWN TO REST IN THE AFTERNOON WHEN CIRCUMSTANCES PERMIT: 2

## 2018-10-26 NOTE — PATIENT INSTRUCTIONS
dryer  - Don't use any caustic or household cleaning solutions such as bleach on your CPAP   equipment.  - Do follow the recommended cleaning schedule. - Do change your disposable filter frequently. Adapted From: [email or web address]. shtm.   These are general suggestions for all models please follow specific s recommendations and specific instructions

## 2018-11-12 NOTE — TELEPHONE ENCOUNTER
I s/w patient and asked why she hadn't been using the CPAP. She said she just felt the pressure was to high. I encouraged her to try to wear the machine in the evening while she is awake as she watches TV to allow her body get used to it. I then said to try to use the machine at least 4 hours a night but ideally all night every night. She said she would give it a try.

## 2018-11-28 ENCOUNTER — HOSPITAL ENCOUNTER (EMERGENCY)
Age: 43
Discharge: HOME | End: 2018-11-28
Attending: EMERGENCY MEDICINE
Payer: COMMERCIAL

## 2018-11-28 VITALS
DIASTOLIC BLOOD PRESSURE: 79 MMHG | RESPIRATION RATE: 16 BRPM | SYSTOLIC BLOOD PRESSURE: 118 MMHG | WEIGHT: 255 LBS | BODY MASS INDEX: 42.43 KG/M2 | HEART RATE: 62 BPM | OXYGEN SATURATION: 100 % | TEMPERATURE: 98 F

## 2018-11-28 DIAGNOSIS — M54.50 ACUTE LEFT-SIDED LOW BACK PAIN WITHOUT SCIATICA: Primary | ICD-10-CM

## 2018-11-28 PROCEDURE — 6370000000 HC RX 637 (ALT 250 FOR IP): Performed by: EMERGENCY MEDICINE

## 2018-11-28 PROCEDURE — 99282 EMERGENCY DEPT VISIT SF MDM: CPT

## 2018-11-28 RX ORDER — LIDOCAINE 4 G/G
1 PATCH TOPICAL ONCE
Status: DISCONTINUED | OUTPATIENT
Start: 2018-11-28 | End: 2018-11-28 | Stop reason: HOSPADM

## 2018-11-28 RX ORDER — LIDOCAINE 50 MG/G
1 PATCH TOPICAL DAILY
Qty: 30 PATCH | Refills: 0 | Status: SHIPPED | OUTPATIENT
Start: 2018-11-28 | End: 2018-12-28

## 2018-11-28 RX ORDER — LIDOCAINE 50 MG/G
1 PATCH TOPICAL DAILY
Qty: 30 PATCH | Refills: 0 | Status: SHIPPED | OUTPATIENT
Start: 2018-11-28 | End: 2018-11-28

## 2018-11-28 ASSESSMENT — PAIN SCALES - GENERAL
PAINLEVEL_OUTOF10: 10
PAINLEVEL_OUTOF10: 10

## 2018-11-28 ASSESSMENT — ENCOUNTER SYMPTOMS
FACIAL SWELLING: 0
BOWEL INCONTINENCE: 0
BACK PAIN: 1
ABDOMINAL PAIN: 0
COLOR CHANGE: 0
VOMITING: 0
SHORTNESS OF BREATH: 0
VOICE CHANGE: 0
TROUBLE SWALLOWING: 0
NAUSEA: 0
STRIDOR: 0
WHEEZING: 0

## 2018-11-28 ASSESSMENT — PAIN SCALES - WONG BAKER: WONGBAKER_NUMERICALRESPONSE: 10

## 2018-11-28 ASSESSMENT — PAIN DESCRIPTION - LOCATION: LOCATION: BACK

## 2018-11-28 NOTE — ED PROVIDER NOTES
self-injury and suicidal ideas. Except as noted above the remainder of the review of systems was reviewed and negative. PAST MEDICAL HISTORY     Past Medical History:   Diagnosis Date    Abdominal pain, other specified site 2013    Anemia     Anxiety     Chronic GERD 2016    Chronic nausea     Clostridium difficile diarrhea 2013    4 positive tests in     Depression     Fibromyalgia     GERD (gastroesophageal reflux disease)     Morbid obesity with BMI of 40.0-44.9, adult ([facility] [address]     Neuroma digital nerve 2016    PONV (postoperative nausea and vomiting)     Primary osteoarthritis of right knee 2/10/2016    Sleep apnea     Thyroid disease     hypothyroidism         SURGICAL HISTORY       Past Surgical History:   Procedure Laterality Date    BUNIONECTOMY Right      SECTION      x 2    CHOLECYSTECTOMY      COLONOSCOPY  13    NOT COMPLETE    CYSTOSCOPY      DILATION AND CURETTAGE OF UTERUS      FOOT SURGERY      neuroma and lesion excision    FOOT SURGERY Right 2016    HEMORRHOID SURGERY      HERNIA REPAIR      bilateral inguinal X3    HYSTERECTOMY      LAPAROSCOPY      OTHER SURGICAL HISTORY      Fecal transplant    OTHER SURGICAL HISTORY  10/10/2018    cystoscopy, urethral dilatation    MN CYSTOSCOPY,DIL URETHRAL STRICTURE N/A 10/10/2018    CYSTOSCOPY, URETHRAL DILATATION performed by Surinder Jones MD at [address] [place] [address]  2017         CURRENT MEDICATIONS       Previous Medications    BUSPIRONE (BUSPAR) 5 MG TABLET    30mg TID    CALCIUM CARBONATE (TUMS) 500 MG CHEWABLE TABLET    Take 2 tablets by mouth as needed Indications: for reflux     CETIRIZINE (ZYRTEC) 10 MG TABLET    TAKE 1 TABLET BY MOUTH DAILY    DICYCLOMINE (BENTYL) 20 MG TABLET    TAKE 2 TABLETS BY MOUTH THREE TIMES DAILY    DIPHENHYDRAMINE (BENADRYL) 50 MG CAPSULE    Take 50 mg by mouth nightly as needed for Allergies.  Anxiety Disorder Mother     Diabetes Father     Heart Disease Father     Anxiety Disorder Father     Depression Father     Other Father         PTSD    Cancer Father         Throat           SOCIAL HISTORY       Social History     Social History    Marital status:      Spouse name: N/A    Number of children: N/A    Years of education: N/A     Social History Main Topics    Smoking status: Former Smoker     Packs/day: 1.00     Years: 28.00     Types: Cigarettes     Quit date: 12/2017    Smokeless tobacco: Never Used    Alcohol use No    Drug use: No    Sexual activity: No     Other Topics Concern    None     Social History Narrative    ** Merged History Encounter **              PHYSICAL EXAM    (up to 7 for level 4, 8 or more for level 5)     ED Triage Vitals [11/28/18 1651]   BP Temp Temp Source Pulse Resp SpO2 Height Weight   118/79 98 °F (36.7 °C) Oral 62 16 100 % -- 255 lb (115.7 kg)       Physical Exam   Constitutional: She is oriented to person, place, and time. She appears well-developed and well-nourished. HENT:   Head: Normocephalic and atraumatic. Right Ear: External ear normal.   Left Ear: External ear normal.   Eyes: Conjunctivae and EOM are normal.   Neck: Neck supple. No JVD present. No tracheal deviation present. Cardiovascular: Normal rate and intact distal pulses. 2+ DP and PT pulses in left lower extremity. Capillary refill intact in left lower extremity. Pulmonary/Chest: Effort normal and breath sounds normal. No respiratory distress. She has no wheezes. Abdominal: Soft. She exhibits no distension. There is no tenderness. There is no rebound and no guarding. Musculoskeletal: Normal range of motion. She exhibits tenderness (left paraspinal lumbar tenderness with no midline tenderness and no rash or skin abnormalities. ). She exhibits no deformity. Neurological: She is alert and oriented to person, place, and time. No cranial nerve deficit.    No saddle anesthesia. 5 out of 5 strength in bilateral lower extremity's. Sensation intact in all nerve dystrophy patient's of bilateral lower extremities. Negative straight leg raise bilaterally. Skin: Skin is warm and dry. Capillary refill takes less than 2 seconds. She is not diaphoretic. Nursing note and vitals reviewed. EMERGENCY DEPARTMENT COURSE and DIFFERENTIAL DIAGNOSIS/MDM:   Vitals:    Vitals:    11/28/18 1651   BP: 118/79   Pulse: 62   Resp: 16   Temp: 98 °F (36.7 °C)   TempSrc: Oral   SpO2: 100%   Weight: 255 lb (115.7 kg)         MDM  I estimate there is low risk for Abdominal aortic aneurysm, cauda equina syndrome, epidural mass lesion, thus I consider the discharge disposition reasonable. We have discussed the diagnosis and risks, and we agree with discharging home to follow-up with their primary doctor. We also discussed returning to the Emergency Department immediately if new or worsening symptoms occur. We have discussed the symptoms which are most concerning (e.g., saddle anesthesia, urinary or bowel incontinence or retention, changing or worsening pain) that necessitate immediate return. Procedures    FINAL IMPRESSION      1. Acute left-sided low back pain without sciatica          DISPOSITION/PLAN   DISPOSITION Decision To Discharge 11/28/2018 05:53:26 PM      PATIENT REFERRED TO:  [address]    In 2 days      [facility]  ED  [facility] [address] [phone]    If symptoms worsen      DISCHARGE MEDICATIONS:  New Prescriptions    LIDOCAINE (LIDODERM) 5 %    Place 1 patch onto the skin daily 12 hours on, 12 hours off. (Please note that portions of this note were completed with a voice recognition program.  Efforts were made to edit the dictations but occasionally words aremis-transcribed. )    Lindsay Toribio MD (electronically signed)  Attending Emergency Physician           Sara Bland MD  11/28/18 5836

## 2018-11-29 ENCOUNTER — TELEPHONE (OUTPATIENT)
Dept: PULMONOLOGY | Age: 43
End: 2018-11-29

## 2018-11-29 NOTE — TELEPHONE ENCOUNTER
Patient did not show for 6 week sleep appt  with Brianne De La Cruz  on 11/29/18 Spoke w/pt; apologetic Stated she would keep appt 01/07 with Dr Rosaura Barrios    Patient was also no show on: 04/24/18      LOV 10/26/18  Assessment:       · Mild MARINA. Tried and failed CPAP. BiPAP 16/10 cm H2O- noncompliant  · Obesity  · Moderate COPD, bilateral pulmonary embolism in 2017-per Dr. Rosaura Barrios  · Sleep onset and maintenance insomnia- psychophysiological hyper arousal, poor sleep hygiene,  Comorbid conditions, and MARINA likely contributing.     Plan:     - Changed in office to BIPAP 14/8 cm H2O for acclimation and tolerance, may need to increase once patient is compliant  -Mask fitting in office with RT  -Can order chin strap if needed, patient declines full face mask  -Use ramp button.    -Discussed acclimation techniques  - Advised to use CPAP 6-8 hrs at night and during naps. - Replacement of mask, tubing, head straps every 3-6 months or sooner if damaged. - Patient instructed to contact [employer] for any mask, tubing or machine trouble shooting if problems arise.  - Sleep hygiene  -Cognitive behavioral therapy was discussed with patient including stimulus control and sleep restriction  -Recommend no naps in daytime, set bed/wake times, no TV or phone in bed or if wake at night, decrease time in bed. - Avoid sedatives, alcohol and caffeinated drinks at bed time. - Patient counseled to never drive or operate heavy machinery while fatigue, drowsy or sleepy.    - Weight loss is recommended as a long-term intervention.    - Complications of MARINA if not treated were discussed with patient patient, including: systemic hypertension, pulmonary hypertension, cardiovascular morbidities, car accidents and all cause mortality.  -Patient education handout provided regarding sleep tips and PAP cleaning recommendations      Follow up: 6 week for compliance

## 2018-11-30 ENCOUNTER — TELEPHONE (OUTPATIENT)
Dept: FAMILY MEDICINE CLINIC | Age: 43
End: 2018-11-30

## 2018-12-04 ENCOUNTER — OFFICE VISIT (OUTPATIENT)
Dept: FAMILY MEDICINE CLINIC | Age: 43
End: 2018-12-04
Payer: COMMERCIAL

## 2018-12-04 ENCOUNTER — TELEPHONE (OUTPATIENT)
Dept: FAMILY MEDICINE CLINIC | Age: 43
End: 2018-12-04

## 2018-12-04 VITALS
OXYGEN SATURATION: 98 % | RESPIRATION RATE: 18 BRPM | WEIGHT: 247.2 LBS | SYSTOLIC BLOOD PRESSURE: 130 MMHG | HEIGHT: 65 IN | DIASTOLIC BLOOD PRESSURE: 86 MMHG | BODY MASS INDEX: 41.19 KG/M2 | HEART RATE: 77 BPM

## 2018-12-04 DIAGNOSIS — M54.50 LEFT-SIDED LOW BACK PAIN WITHOUT SCIATICA, UNSPECIFIED CHRONICITY: Primary | ICD-10-CM

## 2018-12-04 DIAGNOSIS — M54.6 ACUTE LEFT-SIDED THORACIC BACK PAIN: ICD-10-CM

## 2018-12-04 PROCEDURE — 99213 OFFICE O/P EST LOW 20 MIN: CPT | Performed by: PHYSICIAN ASSISTANT

## 2018-12-04 PROCEDURE — G8417 CALC BMI ABV UP PARAM F/U: HCPCS | Performed by: PHYSICIAN ASSISTANT

## 2018-12-04 PROCEDURE — 1036F TOBACCO NON-USER: CPT | Performed by: PHYSICIAN ASSISTANT

## 2018-12-04 PROCEDURE — G8428 CUR MEDS NOT DOCUMENT: HCPCS | Performed by: PHYSICIAN ASSISTANT

## 2018-12-04 PROCEDURE — G8484 FLU IMMUNIZE NO ADMIN: HCPCS | Performed by: PHYSICIAN ASSISTANT

## 2018-12-04 ASSESSMENT — ENCOUNTER SYMPTOMS
BACK PAIN: 1
RESPIRATORY NEGATIVE: 1

## 2018-12-17 RX ORDER — RABEPRAZOLE SODIUM 20 MG/1
TABLET, DELAYED RELEASE ORAL
Qty: 60 TABLET | Refills: 0 | Status: SHIPPED | OUTPATIENT
Start: 2018-12-17 | End: 2019-01-10 | Stop reason: SDUPTHER

## 2018-12-20 ENCOUNTER — OFFICE VISIT (OUTPATIENT)
Dept: FAMILY MEDICINE CLINIC | Age: 43
End: 2018-12-20
Payer: COMMERCIAL

## 2018-12-20 VITALS
WEIGHT: 248 LBS | DIASTOLIC BLOOD PRESSURE: 62 MMHG | HEART RATE: 66 BPM | TEMPERATURE: 98.6 F | BODY MASS INDEX: 41.32 KG/M2 | SYSTOLIC BLOOD PRESSURE: 104 MMHG | OXYGEN SATURATION: 98 % | HEIGHT: 65 IN

## 2018-12-20 DIAGNOSIS — L24.9 IRRITANT CONTACT DERMATITIS, UNSPECIFIED TRIGGER: Primary | ICD-10-CM

## 2018-12-20 PROCEDURE — 1036F TOBACCO NON-USER: CPT | Performed by: FAMILY MEDICINE

## 2018-12-20 PROCEDURE — G8417 CALC BMI ABV UP PARAM F/U: HCPCS | Performed by: FAMILY MEDICINE

## 2018-12-20 PROCEDURE — G8484 FLU IMMUNIZE NO ADMIN: HCPCS | Performed by: FAMILY MEDICINE

## 2018-12-20 PROCEDURE — 99213 OFFICE O/P EST LOW 20 MIN: CPT | Performed by: FAMILY MEDICINE

## 2018-12-20 PROCEDURE — G8427 DOCREV CUR MEDS BY ELIG CLIN: HCPCS | Performed by: FAMILY MEDICINE

## 2018-12-20 RX ORDER — TRIAMCINOLONE ACETONIDE 1 MG/G
CREAM TOPICAL
Qty: 1 TUBE | Refills: 0 | Status: SHIPPED | OUTPATIENT
Start: 2018-12-20 | End: 2019-07-12

## 2018-12-20 ASSESSMENT — ENCOUNTER SYMPTOMS
VOMITING: 0
COUGH: 0
WHEEZING: 0
CONSTIPATION: 0
SINUS PRESSURE: 0
EYE PAIN: 0
CHEST TIGHTNESS: 0
EYE DISCHARGE: 0
EYE REDNESS: 0
BLOOD IN STOOL: 0
DIARRHEA: 0
ABDOMINAL PAIN: 0
RHINORRHEA: 0
SHORTNESS OF BREATH: 0

## 2018-12-20 NOTE — PROGRESS NOTES
Subjective:      Patient ID: Meek Danielle is a 37 y.o. female. HPI  Chief Complaint   Patient presents with    Hypothyroidism     3 mth fu. Not fasting. Was seen for back pain on 12/4/18- still not better. Also states that she has all over body aches     Rash     concerns with a rash on her right arm x 2 days     Here for checkup. Has rash on forearms. States tried otc cream without relief  Has not been on thyroid meds since 3 yrs. All retesting has been normal  Has seen rheum for her joint pain. No etiology found    Vitals:    12/20/18 1336   BP: 104/62   Site: Right Upper Arm   Position: Sitting   Cuff Size: Medium Adult   Pulse: 66   Temp: 98.6 °F (37 °C)   TempSrc: Oral   SpO2: 98%   Weight: 248 lb (112.5 kg)   Height: 5' 5\" (1.651 m)     Body mass index is 41.27 kg/m². Wt Readings from Last 3 Encounters:   12/20/18 248 lb (112.5 kg)   12/04/18 247 lb 3.2 oz (112.1 kg)   11/28/18 255 lb (115.7 kg)     BP Readings from Last 3 Encounters:   12/20/18 104/62   12/04/18 130/86   11/28/18 118/79        Review of Systems   Constitutional: Negative for fatigue, fever and unexpected weight change. HENT: Negative for ear discharge, ear pain, hearing loss, rhinorrhea, sinus pressure and tinnitus. Eyes: Negative for pain, discharge, redness and visual disturbance. Respiratory: Negative for cough, chest tightness, shortness of breath and wheezing. Cardiovascular: Negative for chest pain and palpitations. Gastrointestinal: Negative for abdominal pain, blood in stool, constipation, diarrhea and vomiting. Genitourinary: Negative for difficulty urinating, dysuria and hematuria. Skin: Positive for rash. Neurological: Negative for dizziness, seizures, syncope and headaches. Psychiatric/Behavioral: Negative for dysphoric mood and sleep disturbance. The patient is not nervous/anxious. Objective:   Physical Exam   Constitutional: She is oriented to person, place, and time.  She appears well-developed and well-nourished. No distress. HENT:   Head: Normocephalic. Neck: Neck supple. Cardiovascular: Normal rate, regular rhythm and normal heart sounds. Pulmonary/Chest: Effort normal and breath sounds normal.   Abdominal: Soft. Bowel sounds are normal. There is no tenderness. There is no guarding. Musculoskeletal: Normal range of motion. She exhibits no edema or tenderness. Neurological: She is alert and oriented to person, place, and time. Skin: Skin is warm. Rash (b/l forearm contact dermatitis) noted. Assessment:      Contact dermatitis- add TAC cream  Iron def anemia- rec otc oral iron- patient states she has constipation but will try this        Plan:      Orders Placed This Encounter   Medications    triamcinolone (KENALOG) 0.1 % cream     Sig: Apply topically 2 times daily.      Dispense:  1 Tube     Refill:  0             ENEIDA Regan[address], DO

## 2019-01-10 RX ORDER — RABEPRAZOLE SODIUM 20 MG/1
TABLET, DELAYED RELEASE ORAL
Qty: 60 TABLET | Refills: 0 | Status: SHIPPED | OUTPATIENT
Start: 2019-01-10 | End: 2019-02-08 | Stop reason: SDUPTHER

## 2019-02-01 ENCOUNTER — TELEPHONE (OUTPATIENT)
Dept: PULMONOLOGY | Age: 44
End: 2019-02-01

## 2019-02-04 ENCOUNTER — TELEPHONE (OUTPATIENT)
Dept: PULMONOLOGY | Age: 44
End: 2019-02-04

## 2019-02-04 ENCOUNTER — TELEPHONE (OUTPATIENT)
Dept: FAMILY MEDICINE CLINIC | Age: 44
End: 2019-02-04

## 2019-02-04 NOTE — TELEPHONE ENCOUNTER
Spoke with patient, she states she is on a new medication from her pain mgmt physician and thinks it is making her BP run too low, when asked what her BP readings were she states she does not know, she has not actually checked but knows it is low because of the way she feels. Asked her how she feels, she states she is \"dizzy and seeing stars. \"  She was offered several appts for Wednesday but was unable to do this so is scheduled for this Thursday with Dr. Sid Taylor.

## 2019-02-04 NOTE — TELEPHONE ENCOUNTER
Pt stated the following:  Requesting an appointment  Pt Blood pressure running low. Takes medication from Pain management. Pt wants to discuss with PCP. Offered 2/6/19 but has another appointment on that date. Will be available at 4 pm on 2/6/19.   Please advise

## 2019-02-07 ENCOUNTER — OFFICE VISIT (OUTPATIENT)
Dept: FAMILY MEDICINE CLINIC | Age: 44
End: 2019-02-07
Payer: COMMERCIAL

## 2019-02-07 VITALS
HEART RATE: 84 BPM | DIASTOLIC BLOOD PRESSURE: 58 MMHG | BODY MASS INDEX: 41.88 KG/M2 | HEIGHT: 65 IN | SYSTOLIC BLOOD PRESSURE: 96 MMHG | WEIGHT: 251.4 LBS | OXYGEN SATURATION: 98 %

## 2019-02-07 DIAGNOSIS — I95.2 HYPOTENSION DUE TO DRUGS: Primary | ICD-10-CM

## 2019-02-07 DIAGNOSIS — R42 DIZZINESS: ICD-10-CM

## 2019-02-07 PROCEDURE — G8427 DOCREV CUR MEDS BY ELIG CLIN: HCPCS | Performed by: FAMILY MEDICINE

## 2019-02-07 PROCEDURE — 96160 PT-FOCUSED HLTH RISK ASSMT: CPT | Performed by: FAMILY MEDICINE

## 2019-02-07 PROCEDURE — G8417 CALC BMI ABV UP PARAM F/U: HCPCS | Performed by: FAMILY MEDICINE

## 2019-02-07 PROCEDURE — 99213 OFFICE O/P EST LOW 20 MIN: CPT | Performed by: FAMILY MEDICINE

## 2019-02-07 PROCEDURE — G8484 FLU IMMUNIZE NO ADMIN: HCPCS | Performed by: FAMILY MEDICINE

## 2019-02-07 PROCEDURE — 93000 ELECTROCARDIOGRAM COMPLETE: CPT | Performed by: FAMILY MEDICINE

## 2019-02-07 PROCEDURE — 1036F TOBACCO NON-USER: CPT | Performed by: FAMILY MEDICINE

## 2019-02-07 RX ORDER — TOPIRAMATE 100 MG/1
50 TABLET, FILM COATED ORAL 2 TIMES DAILY
Refills: 0 | COMMUNITY
Start: 2019-01-25 | End: 2019-07-12

## 2019-02-07 RX ORDER — ERGOCALCIFEROL 1.25 MG/1
1 CAPSULE ORAL WEEKLY
Refills: 0 | COMMUNITY
Start: 2019-01-23 | End: 2019-04-12 | Stop reason: ALTCHOICE

## 2019-02-07 RX ORDER — BUSPIRONE HYDROCHLORIDE 30 MG/1
30 TABLET ORAL 2 TIMES DAILY
COMMUNITY

## 2019-02-07 RX ORDER — HYDROXYZINE PAMOATE 50 MG/1
25 CAPSULE ORAL 3 TIMES DAILY
Refills: 2 | COMMUNITY
Start: 2019-01-15 | End: 2019-07-12

## 2019-02-07 ASSESSMENT — PATIENT HEALTH QUESTIONNAIRE - PHQ9
SUM OF ALL RESPONSES TO PHQ QUESTIONS 1-9: 24
10. IF YOU CHECKED OFF ANY PROBLEMS, HOW DIFFICULT HAVE THESE PROBLEMS MADE IT FOR YOU TO DO YOUR WORK, TAKE CARE OF THINGS AT HOME, OR GET ALONG WITH OTHER PEOPLE: 2
4. FEELING TIRED OR HAVING LITTLE ENERGY: 3
7. TROUBLE CONCENTRATING ON THINGS, SUCH AS READING THE NEWSPAPER OR WATCHING TELEVISION: 3
3. TROUBLE FALLING OR STAYING ASLEEP: 3
2. FEELING DOWN, DEPRESSED OR HOPELESS: 3
1. LITTLE INTEREST OR PLEASURE IN DOING THINGS: 3
SUM OF ALL RESPONSES TO PHQ QUESTIONS 1-9: 24
5. POOR APPETITE OR OVEREATING: 3
8. MOVING OR SPEAKING SO SLOWLY THAT OTHER PEOPLE COULD HAVE NOTICED. OR THE OPPOSITE, BEING SO FIGETY OR RESTLESS THAT YOU HAVE BEEN MOVING AROUND A LOT MORE THAN USUAL: 3
9. THOUGHTS THAT YOU WOULD BE BETTER OFF DEAD, OR OF HURTING YOURSELF: 0
SUM OF ALL RESPONSES TO PHQ9 QUESTIONS 1 & 2: 6
6. FEELING BAD ABOUT YOURSELF - OR THAT YOU ARE A FAILURE OR HAVE LET YOURSELF OR YOUR FAMILY DOWN: 3

## 2019-02-07 ASSESSMENT — ENCOUNTER SYMPTOMS
EYE REDNESS: 0
WHEEZING: 0
CONSTIPATION: 0
CHEST TIGHTNESS: 0
EYE DISCHARGE: 0
RHINORRHEA: 0
DIARRHEA: 0
SINUS PRESSURE: 0
ABDOMINAL PAIN: 0
VOMITING: 0
SHORTNESS OF BREATH: 0
COUGH: 0
BLOOD IN STOOL: 0
EYE PAIN: 0

## 2019-02-08 RX ORDER — RABEPRAZOLE SODIUM 20 MG/1
TABLET, DELAYED RELEASE ORAL
Qty: 60 TABLET | Refills: 0 | Status: SHIPPED | OUTPATIENT
Start: 2019-02-08 | End: 2019-03-06 | Stop reason: SDUPTHER

## 2019-02-22 RX ORDER — CETIRIZINE HYDROCHLORIDE 10 MG/1
TABLET ORAL
Qty: 90 TABLET | Refills: 0 | Status: SHIPPED | OUTPATIENT
Start: 2019-02-22

## 2019-03-06 RX ORDER — RABEPRAZOLE SODIUM 20 MG/1
TABLET, DELAYED RELEASE ORAL
Qty: 60 TABLET | Refills: 0 | Status: SHIPPED | OUTPATIENT
Start: 2019-03-06 | End: 2019-04-05 | Stop reason: SDUPTHER

## 2019-03-07 ENCOUNTER — OFFICE VISIT (OUTPATIENT)
Dept: FAMILY MEDICINE CLINIC | Age: 44
End: 2019-03-07
Payer: COMMERCIAL

## 2019-03-07 VITALS
OXYGEN SATURATION: 96 % | DIASTOLIC BLOOD PRESSURE: 78 MMHG | HEIGHT: 65 IN | BODY MASS INDEX: 40.82 KG/M2 | SYSTOLIC BLOOD PRESSURE: 120 MMHG | HEART RATE: 74 BPM | WEIGHT: 245 LBS

## 2019-03-07 DIAGNOSIS — M53.3 TAIL BONE PAIN: ICD-10-CM

## 2019-03-07 DIAGNOSIS — M54.50 CHRONIC BILATERAL LOW BACK PAIN WITHOUT SCIATICA: Primary | ICD-10-CM

## 2019-03-07 DIAGNOSIS — G89.29 CHRONIC BILATERAL LOW BACK PAIN WITHOUT SCIATICA: Primary | ICD-10-CM

## 2019-03-07 PROCEDURE — 1036F TOBACCO NON-USER: CPT | Performed by: FAMILY MEDICINE

## 2019-03-07 PROCEDURE — 99213 OFFICE O/P EST LOW 20 MIN: CPT | Performed by: FAMILY MEDICINE

## 2019-03-07 PROCEDURE — G8417 CALC BMI ABV UP PARAM F/U: HCPCS | Performed by: FAMILY MEDICINE

## 2019-03-07 PROCEDURE — G8427 DOCREV CUR MEDS BY ELIG CLIN: HCPCS | Performed by: FAMILY MEDICINE

## 2019-03-07 PROCEDURE — G8484 FLU IMMUNIZE NO ADMIN: HCPCS | Performed by: FAMILY MEDICINE

## 2019-03-07 ASSESSMENT — ENCOUNTER SYMPTOMS
BACK PAIN: 1
VOMITING: 0
SHORTNESS OF BREATH: 0
EYE PAIN: 0
ABDOMINAL PAIN: 0
NAUSEA: 0

## 2019-03-08 ENCOUNTER — HOSPITAL ENCOUNTER (OUTPATIENT)
Dept: GENERAL RADIOLOGY | Age: 44
Discharge: HOME | End: 2019-03-08
Payer: COMMERCIAL

## 2019-03-08 DIAGNOSIS — G89.29 CHRONIC BILATERAL LOW BACK PAIN WITHOUT SCIATICA: ICD-10-CM

## 2019-03-08 DIAGNOSIS — M53.3 TAIL BONE PAIN: ICD-10-CM

## 2019-03-08 DIAGNOSIS — M54.50 CHRONIC BILATERAL LOW BACK PAIN WITHOUT SCIATICA: ICD-10-CM

## 2019-03-08 PROCEDURE — 72220 X-RAY EXAM SACRUM TAILBONE: CPT

## 2019-03-08 PROCEDURE — 72100 X-RAY EXAM L-S SPINE 2/3 VWS: CPT

## 2019-03-11 ENCOUNTER — TELEPHONE (OUTPATIENT)
Dept: FAMILY MEDICINE CLINIC | Age: 44
End: 2019-03-11

## 2019-03-11 DIAGNOSIS — S32.2XXA CLOSED FRACTURE OF SACRUM AND COCCYX, INITIAL ENCOUNTER (HCC): Primary | ICD-10-CM

## 2019-03-11 DIAGNOSIS — S32.10XA CLOSED FRACTURE OF SACRUM AND COCCYX, INITIAL ENCOUNTER (HCC): Primary | ICD-10-CM

## 2019-03-11 RX ORDER — METHYLPREDNISOLONE 4 MG/1
TABLET ORAL
Qty: 1 KIT | Refills: 0 | Status: SHIPPED | OUTPATIENT
Start: 2019-03-11 | End: 2019-03-17

## 2019-03-19 ENCOUNTER — TELEPHONE (OUTPATIENT)
Dept: FAMILY MEDICINE CLINIC | Age: 44
End: 2019-03-19

## 2019-03-20 ENCOUNTER — OFFICE VISIT (OUTPATIENT)
Dept: ORTHOPEDIC SURGERY | Age: 44
End: 2019-03-20
Payer: COMMERCIAL

## 2019-03-20 VITALS
SYSTOLIC BLOOD PRESSURE: 126 MMHG | DIASTOLIC BLOOD PRESSURE: 74 MMHG | HEIGHT: 65 IN | HEART RATE: 69 BPM | WEIGHT: 244.93 LBS | BODY MASS INDEX: 40.81 KG/M2

## 2019-03-20 DIAGNOSIS — S32.2XXA FRACTURE OF COCCYX, INITIAL ENCOUNTER FOR CLOSED FRACTURE (HCC): ICD-10-CM

## 2019-03-20 DIAGNOSIS — M51.36 DDD (DEGENERATIVE DISC DISEASE), LUMBAR: Primary | ICD-10-CM

## 2019-03-20 PROCEDURE — 99213 OFFICE O/P EST LOW 20 MIN: CPT | Performed by: PHYSICIAN ASSISTANT

## 2019-03-20 PROCEDURE — G8417 CALC BMI ABV UP PARAM F/U: HCPCS | Performed by: PHYSICIAN ASSISTANT

## 2019-03-20 PROCEDURE — G8427 DOCREV CUR MEDS BY ELIG CLIN: HCPCS | Performed by: PHYSICIAN ASSISTANT

## 2019-03-20 PROCEDURE — 1036F TOBACCO NON-USER: CPT | Performed by: PHYSICIAN ASSISTANT

## 2019-03-20 PROCEDURE — G8484 FLU IMMUNIZE NO ADMIN: HCPCS | Performed by: PHYSICIAN ASSISTANT

## 2019-03-29 ENCOUNTER — TELEPHONE (OUTPATIENT)
Dept: ORTHOPEDIC SURGERY | Age: 44
End: 2019-03-29

## 2019-04-08 RX ORDER — RABEPRAZOLE SODIUM 20 MG/1
TABLET, DELAYED RELEASE ORAL
Qty: 60 TABLET | Refills: 0 | Status: SHIPPED | OUTPATIENT
Start: 2019-04-08 | End: 2021-07-18 | Stop reason: ALTCHOICE

## 2019-04-08 RX ORDER — ALBUTEROL SULFATE 90 UG/1
AEROSOL, METERED RESPIRATORY (INHALATION)
Qty: 18 G | Refills: 5 | OUTPATIENT
Start: 2019-04-08

## 2019-04-08 NOTE — TELEPHONE ENCOUNTER
Zenon Ivey is requesting refill(s) aciphex  Last OV 3/7/19 (pertaining to medication)  LR 3/6/19 (per medication requested)  Next office visit scheduled or attempted No   If no, reason:  Pt is still active at this practice until 4/11/19

## 2019-04-12 ENCOUNTER — APPOINTMENT (OUTPATIENT)
Dept: CT IMAGING | Age: 44
End: 2019-04-12
Payer: COMMERCIAL

## 2019-04-12 ENCOUNTER — HOSPITAL ENCOUNTER (EMERGENCY)
Age: 44
Discharge: HOME | End: 2019-04-12
Attending: EMERGENCY MEDICINE
Payer: COMMERCIAL

## 2019-04-12 VITALS
TEMPERATURE: 97.8 F | OXYGEN SATURATION: 99 % | BODY MASS INDEX: 39.99 KG/M2 | WEIGHT: 240 LBS | HEIGHT: 65 IN | SYSTOLIC BLOOD PRESSURE: 122 MMHG | DIASTOLIC BLOOD PRESSURE: 89 MMHG | RESPIRATION RATE: 16 BRPM | HEART RATE: 58 BPM

## 2019-04-12 DIAGNOSIS — R10.9 LEFT FLANK PAIN: Primary | ICD-10-CM

## 2019-04-12 DIAGNOSIS — M54.50 ACUTE LEFT-SIDED LOW BACK PAIN WITHOUT SCIATICA: ICD-10-CM

## 2019-04-12 LAB
A/G RATIO: 1.5 (ref 1.1–2.2)
ALBUMIN SERPL-MCNC: 4.1 G/DL (ref 3.4–5)
ALP BLD-CCNC: 79 U/L (ref 40–129)
ALT SERPL-CCNC: 17 U/L (ref 10–40)
ANION GAP SERPL CALCULATED.3IONS-SCNC: 10 MMOL/L (ref 3–16)
AST SERPL-CCNC: 24 U/L (ref 15–37)
BILIRUB SERPL-MCNC: 0.3 MG/DL (ref 0–1)
BILIRUBIN URINE: NEGATIVE
BLOOD, URINE: NEGATIVE
BUN BLDV-MCNC: 12 MG/DL (ref 7–20)
CALCIUM SERPL-MCNC: 9 MG/DL (ref 8.3–10.6)
CHLORIDE BLD-SCNC: 105 MMOL/L (ref 99–110)
CLARITY: CLEAR
CO2: 22 MMOL/L (ref 21–32)
COLOR: YELLOW
CREAT SERPL-MCNC: 0.8 MG/DL (ref 0.6–1.1)
GFR AFRICAN AMERICAN: >60
GFR NON-AFRICAN AMERICAN: >60
GLOBULIN: 2.8 G/DL
GLUCOSE BLD-MCNC: 91 MG/DL (ref 70–99)
GLUCOSE URINE: NEGATIVE MG/DL
HCT VFR BLD CALC: 34.5 % (ref 36–48)
HEMOGLOBIN: 11.4 G/DL (ref 12–16)
KETONES, URINE: NEGATIVE MG/DL
LEUKOCYTE ESTERASE, URINE: NEGATIVE
LIPASE: 61 U/L (ref 13–60)
MCH RBC QN AUTO: 25.4 PG (ref 26–34)
MCHC RBC AUTO-ENTMCNC: 33.1 G/DL (ref 31–36)
MCV RBC AUTO: 76.8 FL (ref 80–100)
MICROSCOPIC EXAMINATION: NORMAL
NITRITE, URINE: NEGATIVE
PDW BLD-RTO: 16.9 % (ref 12.4–15.4)
PH UA: 6.5 (ref 5–8)
PLATELET # BLD: 344 K/UL (ref 135–450)
PMV BLD AUTO: 8.7 FL (ref 5–10.5)
POTASSIUM SERPL-SCNC: 4.4 MMOL/L (ref 3.5–5.1)
PROTEIN UA: NEGATIVE MG/DL
RBC # BLD: 4.49 M/UL (ref 4–5.2)
SODIUM BLD-SCNC: 137 MMOL/L (ref 136–145)
SPECIFIC GRAVITY UA: 1.01 (ref 1–1.03)
TOTAL PROTEIN: 6.9 G/DL (ref 6.4–8.2)
URINE TYPE: NORMAL
UROBILINOGEN, URINE: 0.2 E.U./DL
WBC # BLD: 5.2 K/UL (ref 4–11)

## 2019-04-12 PROCEDURE — 74176 CT ABD & PELVIS W/O CONTRAST: CPT

## 2019-04-12 PROCEDURE — 99284 EMERGENCY DEPT VISIT MOD MDM: CPT

## 2019-04-12 PROCEDURE — 81003 URINALYSIS AUTO W/O SCOPE: CPT

## 2019-04-12 PROCEDURE — 85027 COMPLETE CBC AUTOMATED: CPT

## 2019-04-12 PROCEDURE — 96361 HYDRATE IV INFUSION ADD-ON: CPT

## 2019-04-12 PROCEDURE — 2580000003 HC RX 258: Performed by: NURSE PRACTITIONER

## 2019-04-12 PROCEDURE — 96374 THER/PROPH/DIAG INJ IV PUSH: CPT

## 2019-04-12 PROCEDURE — 96375 TX/PRO/DX INJ NEW DRUG ADDON: CPT

## 2019-04-12 PROCEDURE — 96376 TX/PRO/DX INJ SAME DRUG ADON: CPT

## 2019-04-12 PROCEDURE — 83690 ASSAY OF LIPASE: CPT

## 2019-04-12 PROCEDURE — 6360000002 HC RX W HCPCS: Performed by: NURSE PRACTITIONER

## 2019-04-12 PROCEDURE — 80053 COMPREHEN METABOLIC PANEL: CPT

## 2019-04-12 RX ORDER — LIDOCAINE 50 MG/G
1 PATCH TOPICAL DAILY
Qty: 30 PATCH | Refills: 0 | Status: SHIPPED | OUTPATIENT
Start: 2019-04-12

## 2019-04-12 RX ORDER — ORPHENADRINE CITRATE 100 MG/1
100 TABLET, EXTENDED RELEASE ORAL 2 TIMES DAILY
Qty: 14 TABLET | Refills: 0 | Status: SHIPPED | OUTPATIENT
Start: 2019-04-12 | End: 2019-04-19

## 2019-04-12 RX ORDER — MORPHINE SULFATE 4 MG/ML
4 INJECTION, SOLUTION INTRAMUSCULAR; INTRAVENOUS ONCE
Status: COMPLETED | OUTPATIENT
Start: 2019-04-12 | End: 2019-04-12

## 2019-04-12 RX ORDER — ONDANSETRON 2 MG/ML
4 INJECTION INTRAMUSCULAR; INTRAVENOUS ONCE
Status: COMPLETED | OUTPATIENT
Start: 2019-04-12 | End: 2019-04-12

## 2019-04-12 RX ORDER — 0.9 % SODIUM CHLORIDE 0.9 %
1000 INTRAVENOUS SOLUTION INTRAVENOUS ONCE
Status: COMPLETED | OUTPATIENT
Start: 2019-04-12 | End: 2019-04-12

## 2019-04-12 RX ADMIN — ONDANSETRON 4 MG: 2 INJECTION INTRAMUSCULAR; INTRAVENOUS at 13:36

## 2019-04-12 RX ADMIN — SODIUM CHLORIDE 1000 ML: 9 INJECTION, SOLUTION INTRAVENOUS at 13:35

## 2019-04-12 RX ADMIN — MORPHINE SULFATE 4 MG: 4 INJECTION INTRAVENOUS at 13:36

## 2019-04-12 RX ADMIN — MORPHINE SULFATE 4 MG: 4 INJECTION INTRAVENOUS at 15:14

## 2019-04-12 ASSESSMENT — ENCOUNTER SYMPTOMS
ABDOMINAL PAIN: 1
EYES NEGATIVE: 1
BLOOD IN STOOL: 0
NAUSEA: 1
ABDOMINAL DISTENTION: 0
DIARRHEA: 0
SHORTNESS OF BREATH: 0
COUGH: 0
WHEEZING: 0
CONSTIPATION: 0
ALLERGIC/IMMUNOLOGIC NEGATIVE: 1
VOMITING: 0
BACK PAIN: 0

## 2019-04-12 ASSESSMENT — PAIN DESCRIPTION - FREQUENCY: FREQUENCY: CONTINUOUS

## 2019-04-12 ASSESSMENT — PAIN DESCRIPTION - ORIENTATION
ORIENTATION: LEFT
ORIENTATION: LEFT

## 2019-04-12 ASSESSMENT — PAIN SCALES - GENERAL
PAINLEVEL_OUTOF10: 10
PAINLEVEL_OUTOF10: 8
PAINLEVEL_OUTOF10: 8
PAINLEVEL_OUTOF10: 6
PAINLEVEL_OUTOF10: 10

## 2019-04-12 ASSESSMENT — PAIN DESCRIPTION - LOCATION
LOCATION: BACK
LOCATION: FLANK
LOCATION: BACK;FLANK

## 2019-04-12 ASSESSMENT — PAIN DESCRIPTION - PAIN TYPE
TYPE: ACUTE PAIN

## 2019-04-12 ASSESSMENT — PAIN DESCRIPTION - DESCRIPTORS: DESCRIPTORS: ACHING

## 2019-04-12 ASSESSMENT — PAIN - FUNCTIONAL ASSESSMENT: PAIN_FUNCTIONAL_ASSESSMENT: 0-10

## 2019-04-12 NOTE — ED NOTES
Pt states that she started wed evening with left sided flank/back pain with nausea and no vomiting. Pt states that the pain comes in waves and denies any difficulty urinating.       Pola Adler RN  04/12/19 0281

## 2019-04-12 NOTE — ED PROVIDER NOTES
[facility]  ED  eMERGENCY dEPARTMENT eNCOUnter        Pt Name: Dayron Lal  MRN: [id number]  [place] 1975  Date of evaluation: 4/12/2019  Provider: SILVIA Alcaraz CNP  PCP: No primary care provider on file. This patient was seen and evaluated by the attending physician No att. providers found. CHIEF COMPLAINT       Chief Complaint   Patient presents with    Back Pain     denies difficulty urinating    Flank Pain     left sided, radiates into hip and groin.  Nausea       HISTORY OF PRESENT ILLNESS   (Location/Symptom, Timing/Onset, Context/Setting, Quality, Duration, Modifying Factors, Severity)  Note limiting factors. Dayron Lal is a 37 y.o. female who presents with left sided back pain that wraps around to the LLQ of the abdomen. States the pain started two days ago. States nausea but denies any vomiting, painful urination, blood in the urine, diarrhea, shortness of breath, cough, fevers, chest pain or diarrhea. Does state a history of kidney stones. Rates pain a 10/10. States the pain is worse with sitting and is better when moving around. Denies any numbness and tingling of lower extremities, saddle anesthesia's, loss/retention of bowel or bladder or recent surgery    Nursing Notes were all reviewed and agreed with or any disagreements were addressed  in the HPI. REVIEW OF SYSTEMS    (2-9 systems for level 4, 10 or more for level 5)     Review of Systems   Constitutional: Negative for activity change, appetite change, chills, diaphoresis, fatigue and fever. HENT: Negative. Eyes: Negative. Respiratory: Negative for cough, shortness of breath and wheezing. Cardiovascular: Negative for chest pain, palpitations and leg swelling. Gastrointestinal: Positive for abdominal pain and nausea. Negative for abdominal distention, blood in stool, constipation, diarrhea and vomiting. Endocrine: Negative.     Genitourinary: Positive for flank pain. Negative for difficulty urinating, dysuria, frequency and hematuria. Musculoskeletal: Negative for back pain, myalgias, neck pain and neck stiffness. Skin: Negative. Allergic/Immunologic: Negative. Neurological: Negative for dizziness, seizures, syncope, speech difficulty, weakness, light-headedness, numbness and headaches. Hematological: Negative. Psychiatric/Behavioral: Negative. Positives and Pertinent negatives as per HPI. Except as noted abovein the ROS, all other systems were reviewed and negative.        PAST MEDICAL HISTORY     Past Medical History:   Diagnosis Date    Abdominal pain, other specified site 2013    Anemia     Anxiety     Chronic GERD 2016    Chronic nausea     Clostridium difficile diarrhea 2013    4 positive tests in     Depression     Fibromyalgia     GERD (gastroesophageal reflux disease)     Morbid obesity with BMI of 40.0-44.9, adult ([facility] [address]     Neuroma digital nerve 2016    PONV (postoperative nausea and vomiting)     Primary osteoarthritis of right knee 2/10/2016    Sleep apnea     Thyroid disease     hypothyroidism         SURGICAL HISTORY     Past Surgical History:   Procedure Laterality Date    BUNIONECTOMY Right      SECTION      x 2    CHOLECYSTECTOMY      COLONOSCOPY  13    NOT COMPLETE    CYSTOSCOPY      DILATION AND CURETTAGE OF UTERUS      FOOT SURGERY      neuroma and lesion excision    FOOT SURGERY Right 2016    HEMORRHOID SURGERY      HERNIA REPAIR      bilateral inguinal X3    HYSTERECTOMY      LAPAROSCOPY      OTHER SURGICAL HISTORY      Fecal transplant    OTHER SURGICAL HISTORY  10/10/2018    cystoscopy, urethral dilatation    ME CYSTOSCOPY,DIL URETHRAL STRICTURE N/A 10/10/2018    CYSTOSCOPY, URETHRAL DILATATION performed by Yohannes Malik MD at [facility]  2017         [address]       Discharge Medication List as of 4/12/2019  3:51 PM      CONTINUE these medications which have NOT CHANGED    Details   RABEprazole (ACIPHEX) 20 MG tablet TAKE 1 TABLET BY MOUTH TWICE DAILY, Disp-60 tablet, R-0Normal      sertraline (ZOLOFT) 50 MG tablet Take 1 tablet by mouth daily, R-1Historical Med      cetirizine (ZYRTEC) 10 MG tablet TAKE 1 TABLET BY MOUTH DAILY, Disp-90 tablet, R-0Normal      topiramate (TOPAMAX) 100 MG tablet Take 50 mg by mouth 2 times daily , R-0Historical Med      LYRICA 150 MG capsule Take 1 capsule by mouth 2 times daily. ., R-2, DAWHistorical Med      hydrOXYzine (VISTARIL) 50 MG capsule Take 25 mg by mouth 3 times daily , R-2Historical Med      ZOHYDRO ER 10 MG C12A Take 2 capsules by mouth 2 times daily. ., DAWHistorical Med      busPIRone (BUSPAR) 30 MG tablet Take 15 mg by mouth 3 times daily Historical Med      VENTOLIN  (90 Base) MCG/ACT inhaler INHALE 2 PUFFS BY MOUTH INTO THE LUNGS EVERY 6 HOURS AS NEEDED FOR WHEEZING OR SHORTNESS OF BREATH, Disp-1 Inhaler, R-2Normal      triamcinolone (KENALOG) 0.1 % cream Apply topically 2 times daily. , Disp-1 Tube, R-0, Normal      tiZANidine (ZANAFLEX) 4 MG tablet Take 4 mg by mouth every 8 hours as neededHistorical Med      XARELTO 20 MG TABS tablet TAKE 1 TABLET BY MOUTH DAILY WITH BREAKFAST, Disp-30 tablet, R-5Normal      ranitidine (ZANTAC) 300 MG tablet Take 300 mg by mouth daily, R-3Historical Med      ondansetron (ZOFRAN ODT) 4 MG disintegrating tablet Take 1 tablet by mouth every 8 hours as needed for Nausea or Vomiting, Disp-15 tablet, R-0Print      DULoxetine (CYMBALTA) 60 MG extended release capsule TAKE 1 CAPSULE BY MOUTH TWICE DAILY, Disp-60 capsule, R-0      dicyclomine (BENTYL) 20 MG tablet TAKE 2 TABLETS BY MOUTH THREE TIMES DAILY, Disp-180 tablet, R-3      calcium carbonate (TUMS) 500 MG chewable tablet Take 2 tablets by mouth as needed Indications: for reflux Historical Med      metoprolol tartrate (LOPRESSOR) 50 MG tablet Take 50 mg by mouth daily      scopolamine (TRANSDERM-SCOP) transdermal patch Place 1 patch onto the skin every 72 hours, Disp-4 patch, R-3      tamsulosin (FLOMAX) 0.4 MG capsule Take 0.4 mg by mouth daily      diphenhydrAMINE (BENADRYL) 50 MG capsule Take 50 mg by mouth nightly as needed for Allergies. ALLERGIES     Latex; Clindamycin; Serzone [nefazodone]; Butrans [buprenorphine]; Adhesive tape; Aripiprazole; Avelox [moxifloxacin hcl in nacl]; Flagyl [metronidazole]; Indocin [indometacin sodium]; Indomethacin; Blanche Seller hcl]; Moxifloxacin; Neurontin [gabapentin]; Pseudephedrine plus [chlorpheniramine-pseudoeph]; Sudafed [pseudoephedrine hcl]; Sulfa antibiotics; Ultram [tramadol];  Influenza vaccines; and Vancomycin    FAMILYHISTORY       Family History   Problem Relation Age of Onset    High Blood Pressure Mother     Elevated Lipids Mother     Diabetes Mother     Other Mother         Melanoma    Depression Mother     Anxiety Disorder Mother     Diabetes Father     Heart Disease Father     Anxiety Disorder Father     Depression Father     Other Father         PTSD    Cancer Father         Throat           SOCIAL HISTORY       Social History     Socioeconomic History    Marital status:      Spouse name: None    Number of children: None    Years of education: None    Highest education level: None   Occupational History    None   Social Needs    Financial resource strain: None    Food insecurity:     Worry: None     Inability: None    Transportation needs:     Medical: None     Non-medical: None   Tobacco Use    Smoking status: Former Smoker     Packs/day: 1.00     Years: 28.00     Pack years: 28.00     Types: Cigarettes     Last attempt to quit: 2017     Years since quittin.3    Smokeless tobacco: Never Used   Substance and Sexual Activity    Alcohol use: No    Drug use: No    Sexual activity: Never   Lifestyle    Physical activity:     Days per week: None     Minutes per session: None    Stress: None   Relationships    Social connections:     Talks on phone: None     Gets together: None     Attends Restorationism service: None     Active member of club or organization: None     Attends meetings of clubs or organizations: None     Relationship status: None    Intimate partner violence:     Fear of current or ex partner: None     Emotionally abused: None     Physically abused: None     Forced sexual activity: None   Other Topics Concern    None   Social History Narrative    ** Merged History Encounter **            SCREENINGS             PHYSICAL EXAM    (up to 7 for level 4, 8 or more for level 5)     ED Triage Vitals [04/12/19 1242]   BP Temp Temp Source Pulse Resp SpO2 Height Weight   (!) 137/101 97.8 °F (36.6 °C) Oral 76 16 95 % 5' 5\" (1.651 m) 240 lb (108.9 kg)       Physical Exam   Constitutional: She is oriented to person, place, and time. She appears well-developed and well-nourished. No distress. HENT:   Head: Normocephalic and atraumatic. Mouth/Throat: Oropharynx is clear and moist. No oropharyngeal exudate. Eyes: Pupils are equal, round, and reactive to light. Conjunctivae and EOM are normal. Right eye exhibits no discharge. Left eye exhibits no discharge. No scleral icterus. Neck: Normal range of motion. Cardiovascular: Normal rate, regular rhythm, normal heart sounds and intact distal pulses. Exam reveals no gallop and no friction rub. No murmur heard. Pulmonary/Chest: Effort normal and breath sounds normal. No stridor. No respiratory distress. She has no wheezes. She has no rales. She exhibits no tenderness. Abdominal: Soft. Bowel sounds are normal. There is tenderness in the left lower quadrant. There is CVA tenderness. Musculoskeletal: Normal range of motion. Lymphadenopathy:     She has no cervical adenopathy. Neurological: She is alert and oriented to person, place, and time. Skin: Skin is warm and dry.  Capillary refill takes less than 2 noted below, none     Procedures    CRITICAL CARE TIME   N/A    CONSULTS:  None      EMERGENCY DEPARTMENT COURSE and DIFFERENTIALDIAGNOSIS/MDM:   Vitals:    Vitals:    04/12/19 1242 04/12/19 1512 04/12/19 1625   BP: (!) 137/101 110/80 122/89   Pulse: 76 58    Resp: 16 16    Temp: 97.8 °F (36.6 °C)     TempSrc: Oral     SpO2: 95% 99%    Weight: 240 lb (108.9 kg)     Height: 5' 5\" (1.651 m)         Patient was given thefollowing medications:  Medications   0.9 % sodium chloride bolus (0 mLs Intravenous Stopped 4/12/19 1514)   ondansetron (ZOFRAN) injection 4 mg (4 mg Intravenous Given 4/12/19 1336)   morphine injection 4 mg (4 mg Intravenous Given 4/12/19 1336)   morphine injection 4 mg (4 mg Intravenous Given 4/12/19 1514)       Patient is alert and oriented x4. Skin is pwd, no cyanosis or pallor. Moist mucus membranes noted. Heart with RRR. Lungs are clear to auscultation. Abdomen is soft and non-distended. There is left sided CVA tenderness on exam, along with LLQ pain with palpation. Distal pulses and neurovascular status are intact. Differentials:renal calculi, UTI, pyelonephritis, ovarian torsion, constipation  Labs: unremarkable  Urine: no signs of blood of infection  CT:No acute intra-abnormality. 2. No evidence of renal stones or obstructive uropathy. NS bolus, zofran and morphine given  Diagnosis: left flank pain, low back pain  Patient will be discharged with a Norflex and lidoderm patches and instructed to follow up with PCP and ortho this week. No pain medication given because patient is on chronic pain medication for pain      FINAL IMPRESSION      1. Left flank pain    2.  Acute left-sided low back pain without sciatica          DISPOSITION/PLAN   DISPOSITION        PATIENT REFERREDTO:  [facility]) Pre-Services  [phone]  Schedule an appointment as soon as possible for a visit in 3 days  For recheck    Raul Chou, [address]  [phone]    Schedule an appointment

## 2019-04-12 NOTE — ED PROVIDER NOTES
Emergency Department Provider Note     Location: [facility]  ED  4/12/2019    I independently performed a history and physical on [address] [facility]. All diagnostic, treatment, and disposition decisions were made by myself in conjunction with the mid-level provider. Briefly, this is a 37 y.o. female here for left sided flank pain started on Wednesday radiating to the pelvic area.  + history of kidney stone. Denies hematuria, urinary frequency. No vaginal discharge. No changes to her bowel movement. No fever. She cannot think of anything that could have triggered the pain. The pain is worse with movement. Pain does not radiate into her leg. No bowel or urinary retention or incontinence. No saddle paresthesia. She also has a history of herniated disc and sacral fracture back in March, 2019. When she last saw [facility] in March, she said her insurance denied the preauthorization for MRI of her back. ED Triage Vitals [04/12/19 1242]   BP Temp Temp Source Pulse Resp SpO2 Height Weight   (!) 137/101 97.8 °F (36.6 °C) Oral 76 16 95 % 5' 5\" (1.651 m) 240 lb (108.9 kg)        Exam showed WDWN female ACOx3, abdomen soft, no left inguinal hernia or lymphadenopathy. Left lower lumbar muscle tender to palpation. No midline bony tenderness. No overlying rash to suggest shingles. No flank hematoma. Patient seen and examined in room 31. Ct Abdomen Pelvis Wo Contrast Additional Contrast? None    Result Date: 4/12/2019  EXAMINATION: CT OF THE ABDOMEN AND PELVIS WITHOUT CONTRAST 4/12/2019 2:22 pm TECHNIQUE: CT of the abdomen and pelvis was performed without the administration of intravenous contrast. Multiplanar reformatted images are provided for review. Dose modulation, iterative reconstruction, and/or weight based adjustment of the mA/kV was utilized to reduce the radiation dose to as low as reasonably achievable. COMPARISON: CT of the abdomen and pelvis 05/16/2017.  HISTORY: ORDERING SYSTEM PROVIDED HISTORY: left flank pain TECHNOLOGIST PROVIDED HISTORY: Additional Contrast?->None Ordering Physician Provided Reason for Exam: left flank pain x3 days Acuity: Acute Type of Exam: Initial Relevant Medical/Surgical History: c-sect, hernia, total hysto still has rt ovary FINDINGS: Lower Chest: The partially imaged lungs are without acute abnormality. The partially imaged heart is mildly enlarged without pericardial effusion. Organs: The liver is homogeneous in attenuation. There is no intrahepatic biliary ductal dilatation. Gallbladder is surgically absent. No adrenal mass. The spleen is normal in size. The pancreas is homogeneous without focal ductal dilatation. The kidneys are near symmetric in size without hydronephrosis or nephrolithiasis. GI/Bowel: There is no bowel obstruction. The appendix is not inflamed. Pelvis: The urinary bladder is fluid filled and suboptimally distended. The uterus is absent. 1.8 cm soft tissue density in the right adnexa represents the patient's right ovary on series 2, image 137. Scattered calcified phleboliths are present in the pelvis. Peritoneum/Retroperitoneum: There is no free intraperitoneal gas or large volume ascites. The abdominal aorta is normal in caliber. There is no pathologic abdominal or pelvic lymphadenopathy by size criteria. Bones/Soft Tissues: There is no acute osseous abnormality. 1. No acute intra-abnormality. 2. No evidence of renal stones or obstructive uropathy. Lab - CMP WNL. Lipase 61 nonspecific. UA normal.  CBC showed anemia that is baseline. 37 y.o. F here for left sided back pain that radiates into her left groin. Exam unremarkable. Patient ready had a partial hysterectomy with her left ovary removed and therefore no concern for left-sided ovarian pathology. Her workup appears unremarkable with labs and CT that did not show acute pathology.   We agreed to treat her pain symptomatically and she should follow-up with her PCP and

## 2019-04-12 NOTE — ED NOTES
Pt reading a magazine when RN entered room and states that her pain is a 8/10. Pt updated on results.       Nayan Menendez RN  04/12/19 1949

## 2019-04-22 ENCOUNTER — TELEPHONE (OUTPATIENT)
Dept: FAMILY MEDICINE CLINIC | Age: 44
End: 2019-04-22

## 2019-04-22 NOTE — TELEPHONE ENCOUNTER
Received a request from [facility]    This request has been faxed to [facility] SUR[facility] SPECIALTY \[facility] [place] [facility]\"". (See attached form for specifics)    Per [facility], they will release medical record within 30 days. If the request requires more information, they will contact you, the requester. If you have not received the record within that time frame, please contact [employer] directly @ [phone].

## 2019-04-24 ENCOUNTER — TELEPHONE (OUTPATIENT)
Dept: FAMILY MEDICINE CLINIC | Age: 44
End: 2019-04-24

## 2019-04-24 NOTE — TELEPHONE ENCOUNTER
Records request received from [address] [place] [address]     Request faxed to [facility] SPECIALTY [facility]

## 2019-05-07 RX ORDER — RABEPRAZOLE SODIUM 20 MG/1
TABLET, DELAYED RELEASE ORAL
Qty: 60 TABLET | Refills: 0 | OUTPATIENT
Start: 2019-05-07

## 2019-05-14 ENCOUNTER — HOSPITAL ENCOUNTER (OUTPATIENT)
Dept: GENERAL RADIOLOGY | Age: 44
Discharge: HOME | End: 2019-05-14
Payer: COMMERCIAL

## 2019-05-14 DIAGNOSIS — M53.3 COCCYGEAL PAIN: ICD-10-CM

## 2019-05-14 PROCEDURE — 72220 X-RAY EXAM SACRUM TAILBONE: CPT

## 2019-05-23 ENCOUNTER — OFFICE VISIT (OUTPATIENT)
Dept: ORTHOPEDIC SURGERY | Age: 44
End: 2019-05-23
Payer: COMMERCIAL

## 2019-05-23 VITALS — HEIGHT: 65 IN | BODY MASS INDEX: 40.82 KG/M2 | WEIGHT: 245 LBS

## 2019-05-23 DIAGNOSIS — M25.561 RIGHT KNEE PAIN, UNSPECIFIED CHRONICITY: Primary | ICD-10-CM

## 2019-05-23 DIAGNOSIS — S83.281A TEAR OF LATERAL MENISCUS OF RIGHT KNEE, UNSPECIFIED TEAR TYPE, UNSPECIFIED WHETHER OLD OR CURRENT TEAR, INITIAL ENCOUNTER: ICD-10-CM

## 2019-05-23 PROCEDURE — G8417 CALC BMI ABV UP PARAM F/U: HCPCS | Performed by: ORTHOPAEDIC SURGERY

## 2019-05-23 PROCEDURE — 1036F TOBACCO NON-USER: CPT | Performed by: ORTHOPAEDIC SURGERY

## 2019-05-23 PROCEDURE — 99213 OFFICE O/P EST LOW 20 MIN: CPT | Performed by: ORTHOPAEDIC SURGERY

## 2019-05-23 PROCEDURE — G8427 DOCREV CUR MEDS BY ELIG CLIN: HCPCS | Performed by: ORTHOPAEDIC SURGERY

## 2019-05-23 NOTE — PROGRESS NOTES
induration. Vascular: Examination reveals no swelling or calf tenderness. Peripheral pulses are palpable and 2+. Neurological: The patient has good coordination. There is no weakness or sensory deficit. Body mass index is 40.77 kg/m². Right Knee Examination:    Inspection:  No swelling, ecchymosis or deformity    Palpation:  Tenderness to palpation mainly around the lateral joint with only mild tenderness along the medial lateral joint line    Range of Motion:  0-120° of flexion. Crepitation of the patellofemoral joint. Strength:  4/5 quad strength. 5/5 hamstring strength. Special Tests:  Mildly Positive patellar grind. Positive Veronica's exam.  Negative Lachman's exam.  Stable to varus and valgus stress testing. Skin: There are no rashes, ulcerations or lesions. Gait: Normal    Reflex normal deep tendon reflexes      Additional Examinations:         Contralateral Exam: Examination of the left knee reveals intact skin. There is no focal tenderness. The patient demonstrates full painless range of motion with regard to flexion and extension. Strength is 5/5 throughout all planes. Ligamentous stability is grossly intact. Examination of the right hip reveals intact skin. The patient demonstrates full painless range of motion with regards to flexion, abduction, internal and external rotation. There is no tenderness about the greater trochanter. There is a negative straight leg raise against resistance. Strength is 5/5 throughout all planes. Radiology:     X-rays obtained and reviewed in office:  Views 4 views including AP weightbearing, PA flexed weightbearing, lateral, skyline  Location right knee  Impression well-maintained joint space of the medial and lateral tibiofemoral joints. The patellofemoral joint demonstrates well aligned patella without significant osteoarthritis           Impression:  Encounter Diagnoses   Name Primary?     Right knee pain, unspecified chronicity Yes    Tear of lateral meniscus of right knee, unspecified tear type, unspecified whether old or current tear, initial encounter        Office Procedures:  Orders Placed This Encounter   Procedures    XR KNEE RIGHT (MIN 4 VIEWS)     Standing Status:   Future     Number of Occurrences:   1     Standing Expiration Date:   5/22/2020    MRI Knee Right WO Contrast     Ladora Rubinstein  31 [phone] [facility]; [facility] [address] [facility], [address]    PATIENT TO CALL AND SCHEDULE WHEN SCAN APPROVED     Standing Status:   Future     Standing Expiration Date:   5/22/2020     Order Specific Question:   Reason for exam:     Answer:   R/O LMT       Treatment Plan:  Would like to obtain an MRI to rule out a lateral meniscus tear. Depending on the results of the MRI we will discuss possible continued nonoperative versus operative treatment options.   She will follow up with us after obtaining her MRI

## 2019-05-30 ENCOUNTER — OFFICE VISIT (OUTPATIENT)
Dept: RHEUMATOLOGY | Age: 44
End: 2019-05-30
Payer: COMMERCIAL

## 2019-05-30 VITALS
WEIGHT: 245 LBS | HEIGHT: 65 IN | SYSTOLIC BLOOD PRESSURE: 134 MMHG | DIASTOLIC BLOOD PRESSURE: 86 MMHG | BODY MASS INDEX: 40.82 KG/M2

## 2019-05-30 DIAGNOSIS — M16.0 PRIMARY OSTEOARTHRITIS OF BOTH HIPS: ICD-10-CM

## 2019-05-30 DIAGNOSIS — E66.01 CLASS 3 SEVERE OBESITY WITHOUT SERIOUS COMORBIDITY WITH BODY MASS INDEX (BMI) OF 40.0 TO 44.9 IN ADULT, UNSPECIFIED OBESITY TYPE (HCC): ICD-10-CM

## 2019-05-30 DIAGNOSIS — G47.33 OSA (OBSTRUCTIVE SLEEP APNEA): ICD-10-CM

## 2019-05-30 DIAGNOSIS — M79.7 FIBROMYALGIA: Primary | ICD-10-CM

## 2019-05-30 PROCEDURE — 99244 OFF/OP CNSLTJ NEW/EST MOD 40: CPT | Performed by: INTERNAL MEDICINE

## 2019-05-30 PROCEDURE — G8427 DOCREV CUR MEDS BY ELIG CLIN: HCPCS | Performed by: INTERNAL MEDICINE

## 2019-05-30 PROCEDURE — G8417 CALC BMI ABV UP PARAM F/U: HCPCS | Performed by: INTERNAL MEDICINE

## 2019-05-30 RX ORDER — OXYCODONE AND ACETAMINOPHEN 7.5; 325 MG/1; MG/1
1 TABLET ORAL 4 TIMES DAILY PRN
Refills: 0 | COMMUNITY
Start: 2019-05-24

## 2019-05-30 NOTE — PROGRESS NOTES
[address], [place]                                                           [address], [address] [facility]                                                             [phone] ([phone] (F)      Dear Dr. Karma Alicia MD:  Please find Rheumatology assessment. Thank you for giving me the opportunity to be involved in [address] [place] [address] and I look forward following Ángela along with you. If you have any questions or concerns please feel free to reach me. Note is transcribed using voice recognition software. Inadvertent computerized transcription errors may be present. Patient identification: Benjamín Petties: 1975,43 y.o. Sex: female     A/P  Dalia Patterson was seen today for joint pain. Diagnoses and all orders for this visit:    Fibromyalgia  -     PT aquatic therapy; Future    Class 3 severe obesity without serious comorbidity with body mass index (BMI) of 40.0 to 44.9 in adult, unspecified obesity type (HCC)    MARINA (obstructive sleep apnea)    Primary osteoarthritis of both hips      Generalized Noninflammatory musculoskeletal discomfort-arthralgias, myalgias and upper and lower extremities, and mainly in her spine from cervical to lumbar-mainly from fibromyalgia and degenerative disc disease might be contributing some. Clinically-no evidence of systemic inflammatory arthritis. DACIA, RF, CCP, inflammatory markers were normal.    She also has sleep apnea uncorrected, morbid obesity, and anxiety disorder, which is probably making fibromyalgia worse. She is followed by pain management for chronic pain, is on Percocet, Lyrica, duloxetine, tizanidine, is optimally managed in terms of medications. History of PE on Xarelto.     Plan-  Explained diagnosis, management plan and treatment expectations. Agree with the diagnosis of fibromyalgia, do not suspect inflammatory arthritis. Medical management is optimal, however need to address other things including correction of sleep apnea, recommend seeing sleep medicine, weight management, and daily exercises. Referral was given for aquatic therapy. All her questions were answered. Since, she already has a rheumatologist, recommend following up with current rheumatologist.  If she prefers to come here, would be happy to see her in as-needed basis. Patient indicates understanding and agrees with the management plan. I reviewed patient's history, referral documents and electronic medical records. Copy of consult note is being routed electronically/faxed to referring physician. #######################################################################    REASON FOR CONSULTATION:  Patient is being seen at the request of  Lilliana Arreola MD  for evaluation and management of chronic musculoskeletal discomfort. HISTORY OF PRESENT ILLNESS:  37 y.o. female with known history of fibromyalgia for last 10 years based on chronic generalized musculoskeletal discomfort, fibromyalgia tender points, and excluding inflammatory arthritis, states that she hurts from head to toe-worse pain in her spine from cervical to lumbar that radiates from lumbar spine to the cervical area, activities, movement make it worse, no radiculopathic symptoms. Back pain bothers her all the time, and limits activities. She also reports intermittent peripheral joint pain in her fingers, elbows, shoulders, ankles, feet, sometimes swell up especially finger joints, symptoms are persistent throughout the day and night, and stiffness about 10-15 minutes. She does not have any swollen or inflamed joints today. She is taking multiple medications for pain control which helped to some extent. Aquatic therapy makes her feel great.   Outside rheumatology notes were reviewed, there is a question of possible inflammatory arthritis affecting her upper and lower extremities, therefore hydroxychloroquine was prescribed, patient has not started it yet. She is unable to take NSAIDs because of Xarelto use. Other medical problem- h/o spontaneous tail bone fx, viral meningitis, migraine headaches. IBS, MARINA- BPAP, CPAP. Unable to tolerate CPAP or BiPAP. All her 12 review of systems are negative.       Past Medical History:   Diagnosis Date    Abdominal pain, other specified site 2013    Anemia     Anxiety     Chronic GERD 2016    Chronic nausea     Clostridium difficile diarrhea 2013    4 positive tests in     Depression     Fibromyalgia     GERD (gastroesophageal reflux disease)     Morbid obesity with BMI of 40.0-44.9, adult (HCC)     Neuroma digital nerve 2016    PONV (postoperative nausea and vomiting)     Primary osteoarthritis of right knee 2/10/2016    Sleep apnea     Thyroid disease     hypothyroidism     Past Surgical History:   Procedure Laterality Date    BUNIONECTOMY Right      SECTION      x 2    CHOLECYSTECTOMY      COLONOSCOPY  13    NOT COMPLETE    CYSTOSCOPY      DILATION AND CURETTAGE OF UTERUS      FOOT SURGERY      neuroma and lesion excision    FOOT SURGERY Right 2016    HEMORRHOID SURGERY      HERNIA REPAIR      bilateral inguinal X3    HYSTERECTOMY      LAPAROSCOPY      OTHER SURGICAL HISTORY      Fecal transplant    OTHER SURGICAL HISTORY  10/10/2018    cystoscopy, urethral dilatation    [place] CYSTOSCOPY,DIL URETHRAL STRICTURE N/A 10/10/2018    CYSTOSCOPY, URETHRAL DILATATION performed by Bereket Polanco MD at [facility]  2017     Social History     Socioeconomic History    Marital status:      Spouse name: Not on file    Number of children: Not on file    Years of education: Not on file    Highest education level: Not on file   Occupational History    Not on file   Social Needs    Financial resource strain: Not on file    Food insecurity:     Worry: Not on file     Inability: Not on file    Transportation needs:     Medical: Not on file     Non-medical: Not on file   Tobacco Use    Smoking status: Former Smoker     Packs/day: 1.00     Years: 28.00     Pack years: 28.00     Types: Cigarettes     Last attempt to quit: 2017     Years since quittin.4    Smokeless tobacco: Never Used   Substance and Sexual Activity    Alcohol use: No    Drug use: No    Sexual activity: Never   Lifestyle    Physical activity:     Days per week: Not on file     Minutes per session: Not on file    Stress: Not on file   Relationships    Social connections:     Talks on phone: Not on file     Gets together: Not on file     Attends [employer] service: Not on file     Active member of club or organization: Not on file     Attends meetings of clubs or organizations: Not on file     Relationship status: Not on file    Intimate partner violence:     Fear of current or ex partner: Not on file     Emotionally abused: Not on file     Physically abused: Not on file     Forced sexual activity: Not on file   Other Topics Concern    Not on file   Social History Narrative    ** Merged History Encounter **          No family history of autoimmune diseases    Current Outpatient Medications   Medication Sig Dispense Refill    oxyCODONE-acetaminophen (PERCOCET) 7.5-325 MG per tablet Take 1 tablet by mouth 4 times daily as needed. 0    lidocaine (LIDODERM) 5 % Place 1 patch onto the skin daily 12 hours on, 12 hours off. 30 patch 0    RABEprazole (ACIPHEX) 20 MG tablet TAKE 1 TABLET BY MOUTH TWICE DAILY 60 tablet 0    cetirizine (ZYRTEC) 10 MG tablet TAKE 1 TABLET BY MOUTH DAILY 90 tablet 0    topiramate (TOPAMAX) 100 MG tablet Take 50 mg by mouth 2 times daily   0    LYRICA 150 MG capsule Take 1 capsule by mouth 2 times daily. .  2    hydrOXYzine (VISTARIL) 50 MG capsule Take 25 mg by mouth 3 times daily   2    ZOHYDRO ER 10 MG C12A Take 2 capsules by mouth 2 times daily. Angelo Merida busPIRone (BUSPAR) 30 MG tablet Take 15 mg by mouth 3 times daily       VENTOLIN  (90 Base) MCG/ACT inhaler INHALE 2 PUFFS BY MOUTH INTO THE LUNGS EVERY 6 HOURS AS NEEDED FOR WHEEZING OR SHORTNESS OF BREATH 1 Inhaler 2    triamcinolone (KENALOG) 0.1 % cream Apply topically 2 times daily. 1 Tube 0    tiZANidine (ZANAFLEX) 4 MG tablet Take 4 mg by mouth every 8 hours as needed      XARELTO 20 MG TABS tablet TAKE 1 TABLET BY MOUTH DAILY WITH BREAKFAST 30 tablet 5    ranitidine (ZANTAC) 300 MG tablet Take 300 mg by mouth daily  3    ondansetron (ZOFRAN ODT) 4 MG disintegrating tablet Take 1 tablet by mouth every 8 hours as needed for Nausea or Vomiting 15 tablet 0    DULoxetine (CYMBALTA) 60 MG extended release capsule TAKE 1 CAPSULE BY MOUTH TWICE DAILY 60 capsule 0    dicyclomine (BENTYL) 20 MG tablet TAKE 2 TABLETS BY MOUTH THREE TIMES DAILY (Patient taking differently: take 2 tablets four times daily) 180 tablet 3    calcium carbonate (TUMS) 500 MG chewable tablet Take 2 tablets by mouth as needed Indications: for reflux       metoprolol tartrate (LOPRESSOR) 50 MG tablet Take 50 mg by mouth daily      scopolamine (TRANSDERM-SCOP) transdermal patch Place 1 patch onto the skin every 72 hours (Patient taking differently: Place 1 patch onto the skin every 72 hours as needed ) 4 patch 3    tamsulosin (FLOMAX) 0.4 MG capsule Take 0.4 mg by mouth daily      diphenhydrAMINE (BENADRYL) 50 MG capsule Take 50 mg by mouth nightly as needed for Allergies.  sertraline (ZOLOFT) 50 MG tablet Take 1 tablet by mouth daily  1     No current facility-administered medications for this visit.       Allergies   Allergen Reactions    Latex      duplicate    Clindamycin Rash     c-diff    Serzone [Nefazodone] Itching    Butrans [Buprenorphine] Hives, Diarrhea and Rash    Adhesive Tape     Aripiprazole      Too much vertigo     Avelox [Moxifloxacin Hcl In Nacl]     Flagyl [Metronidazole]      Worsened c diff    Indocin [Indometacin Sodium]     Indomethacin Hives    Latuda [Lurasidone Hcl]      Personality changes.  Moxifloxacin      Gave c dif    Neurontin [Gabapentin] Other (See Comments)     \"felt psychotic\"  Mood changes    Pseudephedrine Plus [Chlorpheniramine-Pseudoeph]     Sudafed [Pseudoephedrine Hcl]     Sulfa Antibiotics     Ultram [Tramadol]      Can take percocet , morphine     Influenza Vaccines     Vancomycin Rash       PHYSICAL EXAM:    Vitals:    /86   Ht 5' 5\" (1.651 m)   Wt 245 lb (111.1 kg)   LMP 07/31/2011   BMI 40.77 kg/m²   General appearance/ Psychiatric: well nourished, and well groomed, normal judgement, alert, appears stated age and cooperative. MKS: Generalized hyperesthesia wherever I touch. All fibromyalgia tender points are positive. All joints in her fingers including DIPs, PIPs, MCPs, CMC's, wrists, elbows, knees, ankle and feet joints are tender to touch/pressure, I do not appreciate any synovitis, has full range of motion all peripheral joints including. Fist bilaterally. No appreciated objective findings other than generalized pain. Normal gait, muscle strength in upper and lower extremities bilaterally. Skin: No rashes, no induration or skin thickening or nodules. No evidence ischemia or deformities noted in digits or nails. HEENT: Normal lids, lacrimal glands and pupils. No oral or nasal ulcers. Salivary glands reveal no evidence of abnormality. External inspection of the ears and nose within normal limits. Neck: No masses or asymmetry. No thyroid enlargement. Chest: Normal effort, clear to auscultation. Heart:  Normal s1/s2, no leg edema. Abdomen: soft, non-tender. Liver no palpable. Lymph nodes: No enlargement in cervical, supraclavicular regions. Neurologic: normal deep tendon reflexes. No foot or wrist drop.

## 2019-06-07 ENCOUNTER — OFFICE VISIT (OUTPATIENT)
Dept: ORTHOPEDIC SURGERY | Age: 44
End: 2019-06-07
Payer: COMMERCIAL

## 2019-06-07 DIAGNOSIS — M17.11 PRIMARY OSTEOARTHRITIS OF RIGHT KNEE: Primary | ICD-10-CM

## 2019-06-07 PROCEDURE — G8417 CALC BMI ABV UP PARAM F/U: HCPCS | Performed by: PHYSICIAN ASSISTANT

## 2019-06-07 PROCEDURE — 99213 OFFICE O/P EST LOW 20 MIN: CPT | Performed by: PHYSICIAN ASSISTANT

## 2019-06-07 PROCEDURE — G8427 DOCREV CUR MEDS BY ELIG CLIN: HCPCS | Performed by: PHYSICIAN ASSISTANT

## 2019-06-07 PROCEDURE — 1036F TOBACCO NON-USER: CPT | Performed by: PHYSICIAN ASSISTANT

## 2019-06-07 NOTE — PROGRESS NOTES
Chief Complaint    Results (RIGHT knee MRI Results)      History of Present Illness:  Elena Lira is a 37 y.o. female presents to the office today for a follow-up visit. Patient is here with a chief complaint of right medial and lateral knee pain. Patient has received multiple cortisone injections in the past by Dr. Keyla Grace with good results. Patient is here for MRI results. No new injury or trauma. Increased pain with activities and improvement with rest.  Patient cannot take NSAIDs due to taking Xarelto.     Pain Assessment  Location of Pain: Knee  Location Modifiers: Right  Severity of Pain: 8  Quality of Pain: Sharp, Throbbing  Duration of Pain: Persistent  Frequency of Pain: Constant  Aggravating Factors: Walking, Standing, Stairs, Squatting, Bending  Limiting Behavior: Some  Relieving Factors: Rest]       Medical History:  Past Medical History:   Diagnosis Date    Abdominal pain, other specified site 4/18/2013    Anemia     Anxiety     Chronic GERD 11/17/2016    Chronic nausea     Clostridium difficile diarrhea 4/5/17, 2013    4 positive tests in 2013    Depression     Fibromyalgia     GERD (gastroesophageal reflux disease)     Morbid obesity with BMI of 40.0-44.9, adult ([address]     Neuroma digital nerve 12/16/2016    PONV (postoperative nausea and vomiting)     Primary osteoarthritis of right knee 2/10/2016    Sleep apnea     Thyroid disease     hypothyroidism     Patient Active Problem List    Diagnosis Date Noted    Pulmonary hypertension ([address]      Priority: High    Tear of lateral meniscus of right knee 05/23/2019    Right knee pain 05/23/2019    Chronic pain syndrome 07/23/2018    MARINA treated with BiPAP 07/16/2018    Migraine without aura 07/16/2018    Moderate COPD (chronic obstructive pulmonary disease) ([address] 06/05/2018    Pulmonary embolus ([address] 12/28/2017    Acute massive pulmonary embolism ([address] 12/28/2017    Chondromalacia of patellofemoral joint 07/26/2017    Patellar maltracking 2017    Acute pancreatitis 2017    Hiatal hernia 2017    Mitchell's neuroma of right foot 02/10/2017    Neuroma digital nerve 2016    Chronic GERD 2016    Morbid obesity with BMI of 40.0-44.9, adult ([facility] 10/12/2016    Arthralgia 2012    IBS (irritable bowel syndrome) 2012    Hypothyroidism 2011    Vitamin D deficiency 2011    Depression with anxiety 2010     Past Surgical History:   Procedure Laterality Date    BUNIONECTOMY Right      SECTION      x 2    CHOLECYSTECTOMY      COLONOSCOPY  13    NOT COMPLETE    CYSTOSCOPY      DILATION AND CURETTAGE OF UTERUS      FOOT SURGERY      neuroma and lesion excision    FOOT SURGERY Right 2016    HEMORRHOID SURGERY      HERNIA REPAIR      bilateral inguinal X3    HYSTERECTOMY      LAPAROSCOPY      OTHER SURGICAL HISTORY      Fecal transplant    OTHER SURGICAL HISTORY  10/10/2018    cystoscopy, urethral dilatation    [place] CYSTOSCOPY,DIL URETHRAL STRICTURE N/A 10/10/2018    CYSTOSCOPY, URETHRAL DILATATION performed by Anthony Saunders MD at [facility]  2017     Family History   Problem Relation Age of Onset    High Blood Pressure Mother     Elevated Lipids Mother     Diabetes Mother     Other Mother         Melanoma    Depression Mother     Anxiety Disorder Mother     Diabetes Father     Heart Disease Father     Anxiety Disorder Father     Depression Father     Other Father         PTSD    Cancer Father         Throat      Social History     Socioeconomic History    Marital status:      Spouse name: None    Number of children: None    Years of education: None    Highest education level: None   Occupational History    None   Social Needs    Financial resource strain: None    Food insecurity:     Worry: None     Inability: None    Transportation needs:     Medical: None NEEDED FOR WHEEZING OR SHORTNESS OF BREATH 1 Inhaler 2    triamcinolone (KENALOG) 0.1 % cream Apply topically 2 times daily. 1 Tube 0    tiZANidine (ZANAFLEX) 4 MG tablet Take 4 mg by mouth every 8 hours as needed      XARELTO 20 MG TABS tablet TAKE 1 TABLET BY MOUTH DAILY WITH BREAKFAST 30 tablet 5    ranitidine (ZANTAC) 300 MG tablet Take 300 mg by mouth daily  3    ondansetron (ZOFRAN ODT) 4 MG disintegrating tablet Take 1 tablet by mouth every 8 hours as needed for Nausea or Vomiting 15 tablet 0    DULoxetine (CYMBALTA) 60 MG extended release capsule TAKE 1 CAPSULE BY MOUTH TWICE DAILY 60 capsule 0    dicyclomine (BENTYL) 20 MG tablet TAKE 2 TABLETS BY MOUTH THREE TIMES DAILY (Patient taking differently: take 2 tablets four times daily) 180 tablet 3    calcium carbonate (TUMS) 500 MG chewable tablet Take 2 tablets by mouth as needed Indications: for reflux       metoprolol tartrate (LOPRESSOR) 50 MG tablet Take 50 mg by mouth daily      scopolamine (TRANSDERM-SCOP) transdermal patch Place 1 patch onto the skin every 72 hours (Patient taking differently: Place 1 patch onto the skin every 72 hours as needed ) 4 patch 3    tamsulosin (FLOMAX) 0.4 MG capsule Take 0.4 mg by mouth daily      diphenhydrAMINE (BENADRYL) 50 MG capsule Take 50 mg by mouth nightly as needed for Allergies. No current facility-administered medications for this visit. Review of Systems:  Relevant review of systems reviewed and available in the patient's chart    Vital Signs: There were no vitals filed for this visit. General Exam:   Constitutional: Patient is adequately groomed with no evidence of malnutrition  DTRs: Deep tendon reflexes are intact  Mental Status: The patient is oriented to time, place and person. The patient's mood and affect are appropriate. Lymphatic: The lymphatic examination bilaterally reveals all areas to be without enlargement or induration.   Vascular: Examination reveals no swelling or calf tenderness. Peripheral pulses are palpable and 2+. Neurological: The patient has good coordination. There is no weakness or sensory deficit. There is no height or weight on file to calculate BMI. Right Knee Examination:    Inspection:  No erythema or signs of infection. There are no cutaneous lesions    Palpation:  There is tenderness to palpation along the medial and lateral joint line. Range of Motion:  0 extension to 125 of active flexion. Strength:  4+/5 quadriceps and hamstrings    Special Tests: The knee is stable to varus valgus stressing/anterior posterior drawer. Negative Homans test.                                 Skin: There are no rashes, ulcerations or lesions. Gait: mildly antalgic favoring the left side    Reflex 2+ patellar    Examination of the left knee reveals intact skin. There is no focal tenderness. The patient demonstrates full painless range of motion with regard to flexion and extension. Strength is 5/5 throughout all planes. Ligamentous stability is grossly intact. Examination of the right and left hip reveals intact skin. The patient demonstrates full painless range of motion with regards to flexion, abduction, internal and external rotation. There is no tenderness about the greater trochanter. There is a negative straight leg raise against resistance. Strength is 5/5 throughout all planes. Radiology:     Catina Kelley #: [id number] #: [id number] Procedure: MR Right Knee w/o Contrast ; Reason for Exam: right knee pain, tear of lateral meniscus of right knee   This document is confidential medical information.  Unauthorized disclosure or use of this information is prohibited by law. If you are not the intended recipient of this document, please advise us by calling immediately [phone].       [place] [address] [address]           Patient Name: Dylon Mercado   Case ID: [id number]   Patient : 1975   Referring wear if the patient has significant varus alignment. We then went on to discuss the possibility of Visco supplementation with hyaluronate products. We talked about the typical course of this type of treatment and the fact that often times in the treatment for significant arthritis, this is successful less than half the time. We also talked about the corticosteroid injections and the fact that this can give a brief window of relief, but does not cure the problem; in fact, the pain often has a rebound effect in 6-10 weeks after the steroid has worn off. We also discussed arthroscopy surgery in attempts to debride the joint, but the fact that this is relatively unreliable treatment in the face of significant arthritis. It can occasionally be used, particularly if there is significant meniscus pathology. Lastly we discussed total joint replacement arthroplasty as the final and definitive step in treatment of arthritis. Patient realizes the magnitude of this type of treatment as well as having voiced a general understanding to the duration of the prosthesis. The patient voiced understanding to these continuum of treatment options. Patient would like to proceed with a series of Visco subluxation injections. Patient will follow-up in office once injections have been approved.

## 2019-06-18 ENCOUNTER — TELEPHONE (OUTPATIENT)
Dept: ORTHOPEDIC SURGERY | Age: 44
End: 2019-06-18

## 2019-06-18 NOTE — TELEPHONE ENCOUNTER
06/18/2019. SUPARTZ  (SERIES OF 5)  RIGHT  KNEE. (DENIAL SCANNED UNDER MEDIA TAB)  DOES NOT MEET CLINICAL POLICY. PER FAX FROM [facility] DEPARTMENT.   AP

## 2019-06-21 ENCOUNTER — TELEPHONE (OUTPATIENT)
Dept: ORTHOPEDIC SURGERY | Age: 44
End: 2019-06-21

## 2019-06-21 NOTE — TELEPHONE ENCOUNTER
Received a letter from the patient's insurance company indicating that her Supartz injections were denied because of a lack of documented weight loss. I have reviewed medical records from not only our office but also her primary care's office for the last year and unfortunately I cannot document any weight loss. Unfortunately her medication will not be able to be approved until she can demonstrate weight loss. I have left the patient a message stating that her medication was denied but did not want to leave the reason for it on her machine. I have asked her to call me back if she wants to discuss it.

## 2019-07-06 RX ORDER — CETIRIZINE HYDROCHLORIDE 10 MG/1
TABLET ORAL
Qty: 90 TABLET | Refills: 0 | OUTPATIENT
Start: 2019-07-06

## 2019-07-12 ENCOUNTER — HOSPITAL ENCOUNTER (EMERGENCY)
Age: 44
Discharge: HOME | End: 2019-07-13
Payer: OTHER MISCELLANEOUS

## 2019-07-12 ENCOUNTER — APPOINTMENT (OUTPATIENT)
Dept: CT IMAGING | Age: 44
End: 2019-07-12
Payer: OTHER MISCELLANEOUS

## 2019-07-12 ENCOUNTER — HOSPITAL ENCOUNTER (OUTPATIENT)
Dept: GENERAL RADIOLOGY | Age: 44
Discharge: HOME | End: 2019-07-12
Payer: COMMERCIAL

## 2019-07-12 VITALS
SYSTOLIC BLOOD PRESSURE: 148 MMHG | WEIGHT: 250 LBS | HEIGHT: 65 IN | HEART RATE: 88 BPM | RESPIRATION RATE: 16 BRPM | DIASTOLIC BLOOD PRESSURE: 77 MMHG | TEMPERATURE: 98.4 F | BODY MASS INDEX: 41.65 KG/M2 | OXYGEN SATURATION: 100 %

## 2019-07-12 DIAGNOSIS — S16.1XXA ACUTE STRAIN OF NECK MUSCLE, INITIAL ENCOUNTER: ICD-10-CM

## 2019-07-12 DIAGNOSIS — M54.6 PAIN IN THORACIC SPINE: ICD-10-CM
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
## 2019-07-12 DIAGNOSIS — M54.2 CERVICALGIA: ICD-10-CM

## 2019-07-12 DIAGNOSIS — S09.90XA CLOSED HEAD INJURY, INITIAL ENCOUNTER: Primary | ICD-10-CM

## 2019-07-12 PROCEDURE — 99284 EMERGENCY DEPT VISIT MOD MDM: CPT

## 2019-07-12 PROCEDURE — 72125 CT NECK SPINE W/O DYE: CPT

## 2019-07-12 PROCEDURE — 72072 X-RAY EXAM THORAC SPINE 3VWS: CPT

## 2019-07-12 PROCEDURE — 70450 CT HEAD/BRAIN W/O DYE: CPT

## 2019-07-12 PROCEDURE — 72040 X-RAY EXAM NECK SPINE 2-3 VW: CPT

## 2019-07-12 RX ORDER — DESVENLAFAXINE 50 MG/1
100 TABLET, EXTENDED RELEASE ORAL DAILY
Refills: 0 | COMMUNITY
Start: 2019-06-18

## 2019-07-12 RX ORDER — CLONIDINE HYDROCHLORIDE 0.1 MG/1
TABLET ORAL
Refills: 1 | COMMUNITY
Start: 2019-06-10 | End: 2019-10-17

## 2019-07-12 RX ORDER — PROMETHAZINE HYDROCHLORIDE 25 MG/1
TABLET ORAL
Refills: 2 | COMMUNITY
Start: 2019-06-06

## 2019-07-12 RX ORDER — ERGOCALCIFEROL 1.25 MG/1
50000 CAPSULE ORAL WEEKLY
Refills: 0 | COMMUNITY
Start: 2019-06-30

## 2019-07-12 RX ORDER — POLYETHYLENE GLYCOL 3350 17 G/17G
17 POWDER, FOR SOLUTION ORAL
COMMUNITY
Start: 2019-04-09 | End: 2019-10-07

## 2019-07-12 RX ORDER — CYCLOBENZAPRINE HCL 10 MG
TABLET ORAL
Refills: 2 | COMMUNITY
Start: 2019-06-19 | End: 2019-12-05

## 2019-07-12 ASSESSMENT — PAIN DESCRIPTION - PAIN TYPE: TYPE: ACUTE PAIN

## 2019-07-12 ASSESSMENT — PAIN DESCRIPTION - LOCATION: LOCATION: NECK

## 2019-07-12 ASSESSMENT — PAIN SCALES - GENERAL: PAINLEVEL_OUTOF10: 10

## 2019-07-13 PROCEDURE — 6360000002 HC RX W HCPCS

## 2019-07-13 PROCEDURE — 2580000003 HC RX 258

## 2019-07-13 RX ORDER — SODIUM CHLORIDE 9 MG/ML
INJECTION, SOLUTION INTRAVENOUS
Status: COMPLETED
Start: 2019-07-13 | End: 2019-07-13

## 2019-07-13 RX ORDER — DIPHENHYDRAMINE HYDROCHLORIDE 50 MG/ML
INJECTION INTRAMUSCULAR; INTRAVENOUS
Status: COMPLETED
Start: 2019-07-13 | End: 2019-07-13

## 2019-07-13 RX ORDER — METOCLOPRAMIDE HYDROCHLORIDE 5 MG/ML
INJECTION INTRAMUSCULAR; INTRAVENOUS
Status: COMPLETED
Start: 2019-07-13 | End: 2019-07-13

## 2019-07-13 RX ORDER — KETOROLAC TROMETHAMINE 30 MG/ML
INJECTION, SOLUTION INTRAMUSCULAR; INTRAVENOUS
Status: COMPLETED
Start: 2019-07-13 | End: 2019-07-13

## 2019-07-13 RX ADMIN — SODIUM CHLORIDE: 9 INJECTION, SOLUTION INTRAVENOUS at 01:30

## 2019-07-13 RX ADMIN — KETOROLAC TROMETHAMINE: 30 INJECTION, SOLUTION INTRAMUSCULAR at 01:31

## 2019-07-13 RX ADMIN — Medication: at 01:31

## 2019-07-13 RX ADMIN — METOCLOPRAMIDE HYDROCHLORIDE: 5 INJECTION INTRAMUSCULAR; INTRAVENOUS at 01:30

## 2019-07-13 NOTE — ED NOTES
Patient here during downtime. See paper charting if needed.  Patient discharged to home to follow up with neurologist.      Corley [place], RN  [phone]

## 2019-07-16 ENCOUNTER — NURSE ONLY (OUTPATIENT)
Dept: ORTHOPEDIC SURGERY | Age: 44
End: 2019-07-16
Payer: COMMERCIAL

## 2019-07-16 VITALS — WEIGHT: 254 LBS | BODY MASS INDEX: 42.32 KG/M2 | HEIGHT: 65 IN

## 2019-07-16 DIAGNOSIS — M17.11 PRIMARY OSTEOARTHRITIS OF RIGHT KNEE: Primary | ICD-10-CM

## 2019-07-16 PROCEDURE — 20611 DRAIN/INJ JOINT/BURSA W/US: CPT | Performed by: PHYSICIAN ASSISTANT

## 2019-07-16 PROCEDURE — 99999 PR OFFICE/OUTPT VISIT,PROCEDURE ONLY: CPT | Performed by: PHYSICIAN ASSISTANT

## 2019-08-16 ENCOUNTER — TELEPHONE (OUTPATIENT)
Dept: ORTHOPEDIC SURGERY | Age: 44
End: 2019-08-16

## 2019-09-03 ENCOUNTER — HOSPITAL ENCOUNTER (OUTPATIENT)
Dept: WOMENS IMAGING | Age: 44
Discharge: HOME | End: 2019-09-03
Payer: COMMERCIAL

## 2019-09-03 DIAGNOSIS — Z12.31 SCREENING MAMMOGRAM, ENCOUNTER FOR: ICD-10-CM

## 2019-09-03 PROCEDURE — 77063 BREAST TOMOSYNTHESIS BI: CPT

## 2019-10-07 ENCOUNTER — HOSPITAL ENCOUNTER (EMERGENCY)
Age: 44
Discharge: HOME | End: 2019-10-07
Attending: EMERGENCY MEDICINE
Payer: COMMERCIAL

## 2019-10-07 VITALS
OXYGEN SATURATION: 95 % | HEIGHT: 65 IN | SYSTOLIC BLOOD PRESSURE: 107 MMHG | RESPIRATION RATE: 18 BRPM | DIASTOLIC BLOOD PRESSURE: 59 MMHG | HEART RATE: 71 BPM | WEIGHT: 250 LBS | BODY MASS INDEX: 41.65 KG/M2 | TEMPERATURE: 98.1 F

## 2019-10-07 DIAGNOSIS — M25.561 ACUTE PAIN OF RIGHT KNEE: Primary | ICD-10-CM

## 2019-10-07 DIAGNOSIS — M25.461 KNEE EFFUSION, RIGHT: ICD-10-CM

## 2019-10-07 DIAGNOSIS — G43.009 MIGRAINE WITHOUT AURA AND WITHOUT STATUS MIGRAINOSUS, NOT INTRACTABLE: ICD-10-CM

## 2019-10-07 LAB
A/G RATIO: 1.3 (ref 1.1–2.2)
ALBUMIN SERPL-MCNC: 4.4 G/DL (ref 3.4–5)
ALP BLD-CCNC: 103 U/L (ref 40–129)
ALT SERPL-CCNC: 24 U/L (ref 10–40)
ANION GAP SERPL CALCULATED.3IONS-SCNC: 14 MMOL/L (ref 3–16)
AST SERPL-CCNC: 22 U/L (ref 15–37)
BASOPHILS ABSOLUTE: 0.1 K/UL (ref 0–0.2)
BASOPHILS RELATIVE PERCENT: 1.5 %
BILIRUB SERPL-MCNC: 0.3 MG/DL (ref 0–1)
BUN BLDV-MCNC: 11 MG/DL (ref 7–20)
CALCIUM SERPL-MCNC: 10 MG/DL (ref 8.3–10.6)
CHLORIDE BLD-SCNC: 98 MMOL/L (ref 99–110)
CO2: 27 MMOL/L (ref 21–32)
CREAT SERPL-MCNC: 0.7 MG/DL (ref 0.6–1.1)
EOSINOPHILS ABSOLUTE: 0.2 K/UL (ref 0–0.6)
EOSINOPHILS RELATIVE PERCENT: 2.5 %
GFR AFRICAN AMERICAN: >60
GFR NON-AFRICAN AMERICAN: >60
GLOBULIN: 3.3 G/DL
GLUCOSE BLD-MCNC: 135 MG/DL (ref 70–99)
HCT VFR BLD CALC: 40.6 % (ref 36–48)
HEMOGLOBIN: 13.8 G/DL (ref 12–16)
LYMPHOCYTES ABSOLUTE: 1.9 K/UL (ref 1–5.1)
LYMPHOCYTES RELATIVE PERCENT: 29.5 %
MCH RBC QN AUTO: 27 PG (ref 26–34)
MCHC RBC AUTO-ENTMCNC: 34 G/DL (ref 31–36)
MCV RBC AUTO: 79.3 FL (ref 80–100)
MONOCYTES ABSOLUTE: 0.2 K/UL (ref 0–1.3)
MONOCYTES RELATIVE PERCENT: 3.3 %
NEUTROPHILS ABSOLUTE: 4.1 K/UL (ref 1.7–7.7)
NEUTROPHILS RELATIVE PERCENT: 63.2 %
PDW BLD-RTO: 24.6 % (ref 12.4–15.4)
PLATELET # BLD: 334 K/UL (ref 135–450)
PMV BLD AUTO: 8.4 FL (ref 5–10.5)
POTASSIUM SERPL-SCNC: 3.5 MMOL/L (ref 3.5–5.1)
RBC # BLD: 5.12 M/UL (ref 4–5.2)
SODIUM BLD-SCNC: 139 MMOL/L (ref 136–145)
TOTAL PROTEIN: 7.7 G/DL (ref 6.4–8.2)
URIC ACID, SERUM: 3.6 MG/DL (ref 2.6–6)
WBC # BLD: 6.4 K/UL (ref 4–11)

## 2019-10-07 PROCEDURE — 84550 ASSAY OF BLOOD/URIC ACID: CPT

## 2019-10-07 PROCEDURE — 93971 EXTREMITY STUDY: CPT

## 2019-10-07 PROCEDURE — 99283 EMERGENCY DEPT VISIT LOW MDM: CPT

## 2019-10-07 PROCEDURE — 96374 THER/PROPH/DIAG INJ IV PUSH: CPT

## 2019-10-07 PROCEDURE — 6360000002 HC RX W HCPCS: Performed by: EMERGENCY MEDICINE

## 2019-10-07 PROCEDURE — 96375 TX/PRO/DX INJ NEW DRUG ADDON: CPT

## 2019-10-07 PROCEDURE — 80053 COMPREHEN METABOLIC PANEL: CPT

## 2019-10-07 PROCEDURE — 85025 COMPLETE CBC W/AUTO DIFF WBC: CPT

## 2019-10-07 RX ORDER — DEXAMETHASONE 4 MG/1
4 TABLET ORAL 2 TIMES DAILY WITH MEALS
Qty: 20 TABLET | Refills: 0 | Status: SHIPPED | OUTPATIENT
Start: 2019-10-07 | End: 2019-10-15 | Stop reason: SINTOL

## 2019-10-07 RX ORDER — PROMETHAZINE HYDROCHLORIDE 12.5 MG/1
12.5 TABLET ORAL 4 TIMES DAILY PRN
Qty: 20 TABLET | Refills: 0 | Status: SHIPPED | OUTPATIENT
Start: 2019-10-07 | End: 2019-10-14

## 2019-10-07 RX ORDER — DEXAMETHASONE SODIUM PHOSPHATE 4 MG/ML
4 INJECTION, SOLUTION INTRA-ARTICULAR; INTRALESIONAL; INTRAMUSCULAR; INTRAVENOUS; SOFT TISSUE ONCE
Status: COMPLETED | OUTPATIENT
Start: 2019-10-07 | End: 2019-10-07

## 2019-10-07 RX ORDER — FENTANYL CITRATE 50 UG/ML
25 INJECTION, SOLUTION INTRAMUSCULAR; INTRAVENOUS ONCE
Status: COMPLETED | OUTPATIENT
Start: 2019-10-07 | End: 2019-10-07

## 2019-10-07 RX ORDER — DROPERIDOL 2.5 MG/ML
1.25 INJECTION, SOLUTION INTRAMUSCULAR; INTRAVENOUS ONCE
Status: COMPLETED | OUTPATIENT
Start: 2019-10-07 | End: 2019-10-07

## 2019-10-07 RX ORDER — DIPHENHYDRAMINE HYDROCHLORIDE 50 MG/ML
25 INJECTION INTRAMUSCULAR; INTRAVENOUS ONCE
Status: COMPLETED | OUTPATIENT
Start: 2019-10-07 | End: 2019-10-07

## 2019-10-07 RX ADMIN — DEXAMETHASONE SODIUM PHOSPHATE 4 MG: 4 INJECTION, SOLUTION INTRAMUSCULAR; INTRAVENOUS at 15:04

## 2019-10-07 RX ADMIN — DIPHENHYDRAMINE HYDROCHLORIDE 25 MG: 50 INJECTION, SOLUTION INTRAMUSCULAR; INTRAVENOUS at 15:03

## 2019-10-07 RX ADMIN — DROPERIDOL 1.25 MG: 2.5 INJECTION, SOLUTION INTRAMUSCULAR; INTRAVENOUS at 15:05

## 2019-10-07 RX ADMIN — FENTANYL CITRATE 25 MCG: 50 INJECTION, SOLUTION INTRAMUSCULAR; INTRAVENOUS at 15:06

## 2019-10-07 ASSESSMENT — PAIN DESCRIPTION - DESCRIPTORS: DESCRIPTORS: ACHING

## 2019-10-07 ASSESSMENT — PAIN SCALES - GENERAL
PAINLEVEL_OUTOF10: 9
PAINLEVEL_OUTOF10: 7
PAINLEVEL_OUTOF10: 9
PAINLEVEL_OUTOF10: 9

## 2019-10-07 ASSESSMENT — PAIN DESCRIPTION - ONSET: ONSET: ON-GOING

## 2019-10-07 ASSESSMENT — PAIN DESCRIPTION - FREQUENCY: FREQUENCY: CONTINUOUS

## 2019-10-07 ASSESSMENT — PAIN DESCRIPTION - LOCATION: LOCATION: HEAD

## 2019-10-07 ASSESSMENT — PAIN DESCRIPTION - PROGRESSION: CLINICAL_PROGRESSION: NOT CHANGED

## 2019-10-07 ASSESSMENT — PAIN DESCRIPTION - PAIN TYPE: TYPE: ACUTE PAIN

## 2019-10-15 ENCOUNTER — OFFICE VISIT (OUTPATIENT)
Dept: ORTHOPEDIC SURGERY | Age: 44
End: 2019-10-15
Payer: COMMERCIAL

## 2019-10-15 VITALS — BODY MASS INDEX: 43.32 KG/M2 | HEIGHT: 65 IN | WEIGHT: 260 LBS

## 2019-10-15 DIAGNOSIS — M17.11 PRIMARY OSTEOARTHRITIS OF RIGHT KNEE: Primary | ICD-10-CM

## 2019-10-15 DIAGNOSIS — M25.561 RIGHT KNEE PAIN, UNSPECIFIED CHRONICITY: ICD-10-CM

## 2019-10-15 DIAGNOSIS — E66.01 OBESITY, CLASS III, BMI 40-49.9 (MORBID OBESITY) (HCC): ICD-10-CM

## 2019-10-15 PROCEDURE — G8484 FLU IMMUNIZE NO ADMIN: HCPCS | Performed by: PHYSICIAN ASSISTANT

## 2019-10-15 PROCEDURE — 99213 OFFICE O/P EST LOW 20 MIN: CPT | Performed by: PHYSICIAN ASSISTANT

## 2019-10-15 PROCEDURE — G8427 DOCREV CUR MEDS BY ELIG CLIN: HCPCS | Performed by: PHYSICIAN ASSISTANT

## 2019-10-15 PROCEDURE — 1036F TOBACCO NON-USER: CPT | Performed by: PHYSICIAN ASSISTANT

## 2019-10-15 PROCEDURE — L3170 FOOT PLAS HEEL STABI PRE OTS: HCPCS | Performed by: PHYSICIAN ASSISTANT

## 2019-10-15 PROCEDURE — G8417 CALC BMI ABV UP PARAM F/U: HCPCS | Performed by: PHYSICIAN ASSISTANT

## 2019-10-17 ENCOUNTER — HOSPITAL ENCOUNTER (EMERGENCY)
Age: 44
Discharge: HOME | End: 2019-10-18
Attending: EMERGENCY MEDICINE
Payer: COMMERCIAL

## 2019-10-17 ENCOUNTER — HOSPITAL ENCOUNTER (EMERGENCY)
Age: 44
Discharge: HOME | End: 2019-10-17
Attending: EMERGENCY MEDICINE
Payer: COMMERCIAL

## 2019-10-17 VITALS
HEART RATE: 71 BPM | HEIGHT: 65 IN | BODY MASS INDEX: 41.65 KG/M2 | WEIGHT: 250 LBS | SYSTOLIC BLOOD PRESSURE: 123 MMHG | RESPIRATION RATE: 18 BRPM | TEMPERATURE: 98.1 F | OXYGEN SATURATION: 100 % | DIASTOLIC BLOOD PRESSURE: 82 MMHG

## 2019-10-17 DIAGNOSIS — R51.9 ACUTE NONINTRACTABLE HEADACHE, UNSPECIFIED HEADACHE TYPE: Primary | ICD-10-CM

## 2019-10-17 DIAGNOSIS — R51.9 INTRACTABLE EPISODIC HEADACHE, UNSPECIFIED HEADACHE TYPE: Primary | ICD-10-CM

## 2019-10-17 LAB
A/G RATIO: 1.4 (ref 1.1–2.2)
ALBUMIN SERPL-MCNC: 3.9 G/DL (ref 3.4–5)
ALP BLD-CCNC: 85 U/L (ref 40–129)
ALT SERPL-CCNC: 18 U/L (ref 10–40)
ANION GAP SERPL CALCULATED.3IONS-SCNC: 13 MMOL/L (ref 3–16)
AST SERPL-CCNC: 16 U/L (ref 15–37)
BASOPHILS ABSOLUTE: 0.1 K/UL (ref 0–0.2)
BASOPHILS RELATIVE PERCENT: 1.4 %
BILIRUB SERPL-MCNC: <0.2 MG/DL (ref 0–1)
BUN BLDV-MCNC: 14 MG/DL (ref 7–20)
CALCIUM SERPL-MCNC: 9.3 MG/DL (ref 8.3–10.6)
CHLORIDE BLD-SCNC: 99 MMOL/L (ref 99–110)
CO2: 27 MMOL/L (ref 21–32)
CREAT SERPL-MCNC: 0.7 MG/DL (ref 0.6–1.1)
EOSINOPHILS ABSOLUTE: 0.4 K/UL (ref 0–0.6)
EOSINOPHILS RELATIVE PERCENT: 4.5 %
GFR AFRICAN AMERICAN: >60
GFR NON-AFRICAN AMERICAN: >60
GLOBULIN: 2.7 G/DL
GLUCOSE BLD-MCNC: 130 MG/DL (ref 70–99)
HCT VFR BLD CALC: 38.9 % (ref 36–48)
HEMOGLOBIN: 12.9 G/DL (ref 12–16)
INR BLD: 1.42 (ref 0.86–1.14)
LYMPHOCYTES ABSOLUTE: 2.9 K/UL (ref 1–5.1)
LYMPHOCYTES RELATIVE PERCENT: 35.5 %
MCH RBC QN AUTO: 27 PG (ref 26–34)
MCHC RBC AUTO-ENTMCNC: 33.1 G/DL (ref 31–36)
MCV RBC AUTO: 81.4 FL (ref 80–100)
MONOCYTES ABSOLUTE: 0.4 K/UL (ref 0–1.3)
MONOCYTES RELATIVE PERCENT: 5 %
NEUTROPHILS ABSOLUTE: 4.4 K/UL (ref 1.7–7.7)
NEUTROPHILS RELATIVE PERCENT: 53.6 %
PDW BLD-RTO: 24.4 % (ref 12.4–15.4)
PLATELET # BLD: 350 K/UL (ref 135–450)
PLATELET SLIDE REVIEW: ADEQUATE
PMV BLD AUTO: 8.1 FL (ref 5–10.5)
POTASSIUM SERPL-SCNC: 4 MMOL/L (ref 3.5–5.1)
PROTHROMBIN TIME: 16.2 SEC (ref 9.8–13)
RBC # BLD: 4.78 M/UL (ref 4–5.2)
SLIDE REVIEW: ABNORMAL
SODIUM BLD-SCNC: 139 MMOL/L (ref 136–145)
TOTAL PROTEIN: 6.6 G/DL (ref 6.4–8.2)
WBC # BLD: 8.3 K/UL (ref 4–11)

## 2019-10-17 PROCEDURE — 96374 THER/PROPH/DIAG INJ IV PUSH: CPT

## 2019-10-17 PROCEDURE — 6360000002 HC RX W HCPCS: Performed by: EMERGENCY MEDICINE

## 2019-10-17 PROCEDURE — 80053 COMPREHEN METABOLIC PANEL: CPT

## 2019-10-17 PROCEDURE — 2580000003 HC RX 258: Performed by: EMERGENCY MEDICINE

## 2019-10-17 PROCEDURE — 96375 TX/PRO/DX INJ NEW DRUG ADDON: CPT

## 2019-10-17 PROCEDURE — 4500000024 HC ED LEVEL 4 PROCEDURE

## 2019-10-17 PROCEDURE — 85652 RBC SED RATE AUTOMATED: CPT

## 2019-10-17 PROCEDURE — 86140 C-REACTIVE PROTEIN: CPT

## 2019-10-17 PROCEDURE — 99283 EMERGENCY DEPT VISIT LOW MDM: CPT

## 2019-10-17 PROCEDURE — 85610 PROTHROMBIN TIME: CPT

## 2019-10-17 PROCEDURE — 85025 COMPLETE CBC W/AUTO DIFF WBC: CPT

## 2019-10-17 PROCEDURE — 99284 EMERGENCY DEPT VISIT MOD MDM: CPT

## 2019-10-17 PROCEDURE — 96361 HYDRATE IV INFUSION ADD-ON: CPT

## 2019-10-17 RX ORDER — METOCLOPRAMIDE HYDROCHLORIDE 5 MG/ML
10 INJECTION INTRAMUSCULAR; INTRAVENOUS ONCE
Status: COMPLETED | OUTPATIENT
Start: 2019-10-17 | End: 2019-10-17

## 2019-10-17 RX ORDER — KETOROLAC TROMETHAMINE 30 MG/ML
30 INJECTION, SOLUTION INTRAMUSCULAR; INTRAVENOUS ONCE
Status: COMPLETED | OUTPATIENT
Start: 2019-10-17 | End: 2019-10-17

## 2019-10-17 RX ORDER — 0.9 % SODIUM CHLORIDE 0.9 %
1000 INTRAVENOUS SOLUTION INTRAVENOUS ONCE
Status: COMPLETED | OUTPATIENT
Start: 2019-10-17 | End: 2019-10-18

## 2019-10-17 RX ORDER — KETOROLAC TROMETHAMINE 30 MG/ML
15 INJECTION, SOLUTION INTRAMUSCULAR; INTRAVENOUS ONCE
Status: COMPLETED | OUTPATIENT
Start: 2019-10-17 | End: 2019-10-17

## 2019-10-17 RX ORDER — CLONIDINE HYDROCHLORIDE 0.2 MG/1
0.2 TABLET ORAL 2 TIMES DAILY
Refills: 0 | COMMUNITY
Start: 2019-10-07

## 2019-10-17 RX ORDER — DIPHENHYDRAMINE HYDROCHLORIDE 50 MG/ML
25 INJECTION INTRAMUSCULAR; INTRAVENOUS ONCE
Status: COMPLETED | OUTPATIENT
Start: 2019-10-17 | End: 2019-10-17

## 2019-10-17 RX ORDER — 0.9 % SODIUM CHLORIDE 0.9 %
1000 INTRAVENOUS SOLUTION INTRAVENOUS ONCE
Status: COMPLETED | OUTPATIENT
Start: 2019-10-17 | End: 2019-10-17

## 2019-10-17 RX ORDER — ATORVASTATIN CALCIUM 10 MG/1
TABLET, FILM COATED ORAL
Refills: 3 | COMMUNITY
Start: 2019-10-09 | End: 2021-07-18

## 2019-10-17 RX ADMIN — METOCLOPRAMIDE 10 MG: 5 INJECTION, SOLUTION INTRAMUSCULAR; INTRAVENOUS at 04:54

## 2019-10-17 RX ADMIN — METOCLOPRAMIDE 10 MG: 5 INJECTION, SOLUTION INTRAMUSCULAR; INTRAVENOUS at 23:47

## 2019-10-17 RX ADMIN — SODIUM CHLORIDE 1000 ML: 9 INJECTION, SOLUTION INTRAVENOUS at 04:54

## 2019-10-17 RX ADMIN — DIPHENHYDRAMINE HYDROCHLORIDE 25 MG: 50 INJECTION, SOLUTION INTRAMUSCULAR; INTRAVENOUS at 23:47

## 2019-10-17 RX ADMIN — KETOROLAC TROMETHAMINE 30 MG: 30 INJECTION, SOLUTION INTRAMUSCULAR at 04:54

## 2019-10-17 RX ADMIN — HYDROMORPHONE HYDROCHLORIDE 1 MG: 1 INJECTION, SOLUTION INTRAMUSCULAR; INTRAVENOUS; SUBCUTANEOUS at 04:54

## 2019-10-17 RX ADMIN — SODIUM CHLORIDE 1000 ML: 9 INJECTION, SOLUTION INTRAVENOUS at 23:47

## 2019-10-17 RX ADMIN — KETOROLAC TROMETHAMINE 15 MG: 30 INJECTION, SOLUTION INTRAMUSCULAR at 23:47

## 2019-10-17 ASSESSMENT — PAIN DESCRIPTION - PAIN TYPE: TYPE: CHRONIC PAIN

## 2019-10-17 ASSESSMENT — PAIN SCALES - GENERAL
PAINLEVEL_OUTOF10: 4
PAINLEVEL_OUTOF10: 10

## 2019-10-17 ASSESSMENT — PAIN DESCRIPTION - LOCATION: LOCATION: HEAD

## 2019-10-18 ENCOUNTER — TELEPHONE (OUTPATIENT)
Dept: ORTHOPEDIC SURGERY | Age: 44
End: 2019-10-18

## 2019-10-18 VITALS
HEART RATE: 69 BPM | HEIGHT: 65 IN | DIASTOLIC BLOOD PRESSURE: 80 MMHG | OXYGEN SATURATION: 98 % | SYSTOLIC BLOOD PRESSURE: 147 MMHG | BODY MASS INDEX: 41.65 KG/M2 | RESPIRATION RATE: 16 BRPM | TEMPERATURE: 98.2 F | WEIGHT: 250 LBS

## 2019-10-18 LAB
C-REACTIVE PROTEIN: 18.1 MG/L (ref 0–5.1)
SEDIMENTATION RATE, ERYTHROCYTE: 23 MM/HR (ref 0–20)

## 2019-10-18 ASSESSMENT — ENCOUNTER SYMPTOMS
DIARRHEA: 0
BACK PAIN: 0
VOMITING: 0
ABDOMINAL DISTENTION: 0
PHOTOPHOBIA: 0
RHINORRHEA: 0
SHORTNESS OF BREATH: 0
NAUSEA: 0
WHEEZING: 0
COUGH: 0

## 2019-10-18 ASSESSMENT — PAIN SCALES - GENERAL: PAINLEVEL_OUTOF10: 8

## 2019-10-28 ENCOUNTER — APPOINTMENT (OUTPATIENT)
Dept: GENERAL RADIOLOGY | Age: 44
End: 2019-10-28
Payer: COMMERCIAL

## 2019-10-28 ENCOUNTER — HOSPITAL ENCOUNTER (EMERGENCY)
Age: 44
Discharge: LEFT BEFORE DISCHARGE | End: 2019-10-28
Attending: EMERGENCY MEDICINE
Payer: COMMERCIAL

## 2019-10-28 VITALS
SYSTOLIC BLOOD PRESSURE: 122 MMHG | OXYGEN SATURATION: 94 % | DIASTOLIC BLOOD PRESSURE: 88 MMHG | HEART RATE: 94 BPM | BODY MASS INDEX: 41.6 KG/M2 | RESPIRATION RATE: 16 BRPM | WEIGHT: 250 LBS | TEMPERATURE: 98.1 F

## 2019-10-28 DIAGNOSIS — R51.9 NONINTRACTABLE HEADACHE, UNSPECIFIED CHRONICITY PATTERN, UNSPECIFIED HEADACHE TYPE: Primary | ICD-10-CM

## 2019-10-28 DIAGNOSIS — G89.29 CHRONIC PAIN OF RIGHT KNEE: ICD-10-CM

## 2019-10-28 DIAGNOSIS — M25.561 CHRONIC PAIN OF RIGHT KNEE: ICD-10-CM

## 2019-10-28 PROCEDURE — 96374 THER/PROPH/DIAG INJ IV PUSH: CPT

## 2019-10-28 PROCEDURE — 6360000002 HC RX W HCPCS: Performed by: NURSE PRACTITIONER

## 2019-10-28 PROCEDURE — 99284 EMERGENCY DEPT VISIT MOD MDM: CPT

## 2019-10-28 PROCEDURE — 96375 TX/PRO/DX INJ NEW DRUG ADDON: CPT

## 2019-10-28 PROCEDURE — 73562 X-RAY EXAM OF KNEE 3: CPT

## 2019-10-28 PROCEDURE — 6370000000 HC RX 637 (ALT 250 FOR IP): Performed by: NURSE PRACTITIONER

## 2019-10-28 RX ORDER — KETOROLAC TROMETHAMINE 30 MG/ML
30 INJECTION, SOLUTION INTRAMUSCULAR; INTRAVENOUS ONCE
Status: COMPLETED | OUTPATIENT
Start: 2019-10-28 | End: 2019-10-28

## 2019-10-28 RX ORDER — DIPHENHYDRAMINE HCL 25 MG
25 TABLET ORAL ONCE
Status: COMPLETED | OUTPATIENT
Start: 2019-10-28 | End: 2019-10-28

## 2019-10-28 RX ORDER — METOCLOPRAMIDE HYDROCHLORIDE 5 MG/ML
10 INJECTION INTRAMUSCULAR; INTRAVENOUS ONCE
Status: COMPLETED | OUTPATIENT
Start: 2019-10-28 | End: 2019-10-28

## 2019-10-28 RX ADMIN — DIPHENHYDRAMINE HCL 25 MG: 25 TABLET ORAL at 13:42

## 2019-10-28 RX ADMIN — KETOROLAC TROMETHAMINE 30 MG: 30 INJECTION, SOLUTION INTRAMUSCULAR at 13:42

## 2019-10-28 RX ADMIN — METOCLOPRAMIDE 10 MG: 5 INJECTION, SOLUTION INTRAMUSCULAR; INTRAVENOUS at 13:42

## 2019-10-28 ASSESSMENT — PAIN SCALES - GENERAL: PAINLEVEL_OUTOF10: 10

## 2019-11-20 ENCOUNTER — HOSPITAL ENCOUNTER (OUTPATIENT)
Dept: NEUROLOGY | Age: 44
Discharge: HOME | End: 2019-11-20
Payer: COMMERCIAL

## 2019-11-20 ENCOUNTER — HOSPITAL ENCOUNTER (OUTPATIENT)
Dept: MRI IMAGING | Age: 44
Discharge: HOME | End: 2019-11-20
Payer: COMMERCIAL

## 2019-11-20 DIAGNOSIS — S83.281A TEAR OF LATERAL MENISCUS OF RIGHT KNEE, UNSPECIFIED TEAR TYPE, UNSPECIFIED WHETHER OLD OR CURRENT TEAR, INITIAL ENCOUNTER: ICD-10-CM

## 2019-11-20 DIAGNOSIS — M25.561 RIGHT KNEE PAIN, UNSPECIFIED CHRONICITY: ICD-10-CM

## 2019-11-20 PROCEDURE — 73721 MRI JNT OF LWR EXTRE W/O DYE: CPT

## 2019-11-20 PROCEDURE — 95908 NRV CNDJ TST 3-4 STUDIES: CPT | Performed by: PHYSICAL MEDICINE & REHABILITATION

## 2019-11-20 PROCEDURE — 95885 MUSC TST DONE W/NERV TST LIM: CPT | Performed by: PHYSICAL MEDICINE & REHABILITATION

## 2019-12-05 ENCOUNTER — OFFICE VISIT (OUTPATIENT)
Dept: ORTHOPEDIC SURGERY | Age: 44
End: 2019-12-05
Payer: COMMERCIAL

## 2019-12-05 DIAGNOSIS — M17.11 PRIMARY OSTEOARTHRITIS OF RIGHT KNEE: ICD-10-CM

## 2019-12-05 DIAGNOSIS — M25.551 PAIN OF RIGHT HIP JOINT: Primary | ICD-10-CM

## 2019-12-05 DIAGNOSIS — M94.261 CHONDROMALACIA OF KNEE, RIGHT: ICD-10-CM

## 2019-12-05 DIAGNOSIS — G90.50 RSD (REFLEX SYMPATHETIC DYSTROPHY): ICD-10-CM

## 2019-12-05 DIAGNOSIS — M25.561 RIGHT KNEE PAIN, UNSPECIFIED CHRONICITY: ICD-10-CM

## 2019-12-05 PROCEDURE — G8417 CALC BMI ABV UP PARAM F/U: HCPCS | Performed by: ORTHOPAEDIC SURGERY

## 2019-12-05 PROCEDURE — E0114 CRUTCH UNDERARM PAIR NO WOOD: HCPCS | Performed by: ORTHOPAEDIC SURGERY

## 2019-12-05 PROCEDURE — 99213 OFFICE O/P EST LOW 20 MIN: CPT | Performed by: ORTHOPAEDIC SURGERY

## 2019-12-05 PROCEDURE — 1036F TOBACCO NON-USER: CPT | Performed by: ORTHOPAEDIC SURGERY

## 2019-12-05 PROCEDURE — G8484 FLU IMMUNIZE NO ADMIN: HCPCS | Performed by: ORTHOPAEDIC SURGERY

## 2019-12-05 PROCEDURE — G8427 DOCREV CUR MEDS BY ELIG CLIN: HCPCS | Performed by: ORTHOPAEDIC SURGERY

## 2019-12-18 ENCOUNTER — TELEPHONE (OUTPATIENT)
Dept: ORTHOPEDIC SURGERY | Age: 44
End: 2019-12-18

## 2020-01-27 ENCOUNTER — OFFICE VISIT (OUTPATIENT)
Dept: ORTHOPEDIC SURGERY | Age: 45
End: 2020-01-27
Payer: COMMERCIAL

## 2020-01-27 VITALS — BODY MASS INDEX: 41.65 KG/M2 | WEIGHT: 250 LBS | RESPIRATION RATE: 16 BRPM | HEIGHT: 65 IN

## 2020-01-27 PROCEDURE — 99213 OFFICE O/P EST LOW 20 MIN: CPT | Performed by: PHYSICIAN ASSISTANT

## 2020-01-27 PROCEDURE — L1830 KO IMMOB CANVAS LONG PRE OTS: HCPCS | Performed by: PHYSICIAN ASSISTANT

## 2020-01-27 PROCEDURE — G8417 CALC BMI ABV UP PARAM F/U: HCPCS | Performed by: PHYSICIAN ASSISTANT

## 2020-01-27 PROCEDURE — G8427 DOCREV CUR MEDS BY ELIG CLIN: HCPCS | Performed by: PHYSICIAN ASSISTANT

## 2020-01-27 PROCEDURE — 1036F TOBACCO NON-USER: CPT | Performed by: PHYSICIAN ASSISTANT

## 2020-01-27 PROCEDURE — G8484 FLU IMMUNIZE NO ADMIN: HCPCS | Performed by: PHYSICIAN ASSISTANT

## 2020-01-27 NOTE — PROGRESS NOTES
mild valgus. There is not erythema. There is minimal soft tissue swelling. There is no effusion. ROM-  Extension 0          -   Flexion  95   There moderate pain associated with ROM testing. Medial joint line is tender to palpation. Lateral joint line is tender to palpation. Retro patellar crepitus is not present. There is is not crepitus along the joint line with ROM testing. Varus Stress testing does produce pain,                                     does not show laxity. Valgus Stress testing does produce pain,                                       does not show laxity. Lachman's test- negative. Anterior Drawer test- negative. Posterior Drawer test- negative. Veronica's Test- negative. Patellar Compression testing does produce pain. Extensor Mechanism is  intact. Examination of the lower extremities are intact with sensation to light touch. Motor testing  5/5 in all major motor groups of the lower extremities. Gait is normal heel to toe. Gait is antalgic. Negative Reed's Sign. SLR negative. Examination of the lower extremities shows intact perfusion to all extremities. No cyanosis. Digits are warm to touch, capillary refill is less than 2 seconds. There is no edema noted. Examination of the skin over both lower extremities reveals: The skin to be intact without lacerations or abrasions. No significant erythema. No rashes or skin lesions. X Rays: performed in the office today:   AP, PA Standing, Lateral and Sunrise views of right knee: There is mild osteoarthritic findings of the right knee noted grade 1-2 arthritic changes of the medial compartment of the right knee. This is noted with early sclerosis and mild medial joint space narrowing. No acute fractures or dislocations noted. Additional Tests reviewed: None. Additional Outside Records reviewed: None. Diagnosis:       ICD-10-CM    1.  Right knee pain, unspecified chronicity M25.561 and written instructions for the use of and application of this item were provided. They were instructed to contact the office immediately should the brace result in increased pain, decreased sensation, increased swelling or worsening of the condition. Follow Up: With  who she has previously seen for other orthopedic issues. Call or return to clinic prn if these symptoms worsen or fail to improve as anticipated.

## 2020-01-31 ENCOUNTER — OFFICE VISIT (OUTPATIENT)
Dept: ORTHOPEDIC SURGERY | Age: 45
End: 2020-01-31
Payer: COMMERCIAL

## 2020-01-31 PROCEDURE — G8484 FLU IMMUNIZE NO ADMIN: HCPCS | Performed by: PHYSICIAN ASSISTANT

## 2020-01-31 PROCEDURE — 99213 OFFICE O/P EST LOW 20 MIN: CPT | Performed by: PHYSICIAN ASSISTANT

## 2020-01-31 PROCEDURE — G8417 CALC BMI ABV UP PARAM F/U: HCPCS | Performed by: PHYSICIAN ASSISTANT

## 2020-01-31 PROCEDURE — 1036F TOBACCO NON-USER: CPT | Performed by: PHYSICIAN ASSISTANT

## 2020-01-31 PROCEDURE — G8428 CUR MEDS NOT DOCUMENT: HCPCS | Performed by: PHYSICIAN ASSISTANT

## 2020-01-31 NOTE — PROGRESS NOTES
Chief Complaint    Knee Pain (RT KNEE PAIN FELT A POP, SEEN IN [facility] 1/27; HX OF OA)      History of Present Illness:  Kvng Garcia is a 53 y.o. female presents to the office today for a new problem. She is well-known to our practice. Approximately a week ago she was standing from a seated position and felt something pop. She was seen in the orthopedic urgent care and she was placed into a knee immobilizer. Her pain symptoms are mostly concentrated over her patellofemoral joint. Increased pain with activities and improvement with rest.  Patient does have a hard time isolating her pain. She thinks more of the pain is over the anterior aspect of her knee. But she also complains of global knee pain that also involves the posterior aspect of her thigh and into the lower leg.       Pain Assessment  Location of Pain: Knee  Location Modifiers: Right  Severity of Pain: 10  Frequency of Pain: Intermittent  Aggravating Factors: Standing, Walking  Limiting Behavior: Yes  Result of Injury: Yes  Work-Related Injury: No  Are there other pain locations you wish to document?: No    Medical History:  Past Medical History:   Diagnosis Date    Abdominal pain, other specified site 4/18/2013    Anemia     Anxiety     Chronic GERD 11/17/2016    Chronic nausea     Clostridium difficile diarrhea 4/5/17, 2013    4 positive tests in 2013    Depression     Fibromyalgia     GERD (gastroesophageal reflux disease)     Morbid obesity with BMI of 40.0-44.9, adult ([address]     Neuroma digital nerve 12/16/2016    PONV (postoperative nausea and vomiting)     Primary osteoarthritis of right knee 2/10/2016    Sleep apnea     Thyroid disease     hypothyroidism     Patient Active Problem List    Diagnosis Date Noted    Pulmonary hypertension ([address]      Priority: High    Tear of lateral meniscus of right knee 05/23/2019    Right knee pain 05/23/2019    Chronic pain syndrome 07/23/2018    MARINA treated with BiPAP 07/16/2018    12 hours off. 30 patch 0    RABEprazole (ACIPHEX) 20 MG tablet TAKE 1 TABLET BY MOUTH TWICE DAILY 60 tablet 0    cetirizine (ZYRTEC) 10 MG tablet TAKE 1 TABLET BY MOUTH DAILY 90 tablet 0    LYRICA 150 MG capsule Take 1 capsule by mouth 2 times daily. .  2    busPIRone (BUSPAR) 30 MG tablet Take 15 mg by mouth 3 times daily       VENTOLIN  (90 Base) MCG/ACT inhaler INHALE 2 PUFFS BY MOUTH INTO THE LUNGS EVERY 6 HOURS AS NEEDED FOR WHEEZING OR SHORTNESS OF BREATH 1 Inhaler 2    XARELTO 20 MG TABS tablet TAKE 1 TABLET BY MOUTH DAILY WITH BREAKFAST 30 tablet 5    ranitidine (ZANTAC) 300 MG tablet Take 300 mg by mouth daily  3    ondansetron (ZOFRAN ODT) 4 MG disintegrating tablet Take 1 tablet by mouth every 8 hours as needed for Nausea or Vomiting 15 tablet 0    dicyclomine (BENTYL) 20 MG tablet TAKE 2 TABLETS BY MOUTH THREE TIMES DAILY (Patient taking differently: take 2 tablets four times daily) 180 tablet 3    calcium carbonate (TUMS) 500 MG chewable tablet Take 2 tablets by mouth as needed Indications: for reflux       scopolamine (TRANSDERM-SCOP) transdermal patch Place 1 patch onto the skin every 72 hours (Patient taking differently: Place 1 patch onto the skin every 72 hours as needed ) 4 patch 3    tamsulosin (FLOMAX) 0.4 MG capsule Take 0.4 mg by mouth daily      diphenhydrAMINE (BENADRYL) 50 MG capsule Take 50 mg by mouth nightly as needed for Allergies. No current facility-administered medications for this visit. Review of Systems:  Relevant review of systems reviewed and available in the patient's chart    Vital Signs:  LMP 07/31/2011     General Exam:   Constitutional: Patient is adequately groomed with no evidence of malnutrition  DTRs: Deep tendon reflexes are intact  Mental Status: The patient is oriented to time, place and person. The patient's mood and affect are appropriate.   Lymphatic: The lymphatic examination bilaterally reveals all areas to be without enlargement or induration. Vascular: Examination reveals no swelling or calf tenderness. Peripheral pulses are palpable and 2+. Neurological: The patient has good coordination. There is no weakness or sensory deficit. Right knee Examination:    Inspection:  No swelling, ecchymosis, deformity    Palpation:  There is palpation over the lateral facet. Tenderness over the medial joint line. Range of Motion:  0-120° of knee flexion. Mild retropatellar crepitation. Tight hamstrings noted. Strength:  4/5 quad strength. 4+/5 hamstring strength. Special Tests: Positive Veronica's exam.  Negative Lachman's exam.  Stable to varus and valgus stress testing. Positive patellar grind. Skin: There are no rashes, ulcerations or lesions. Gait: Normal gait pattern    Reflex normal deep tendon reflexes    Additional Comments:       Additional Examinations:         Contralateral Exam: Examination of the left knee reveals intact skin. There is no focal tenderness. The patient demonstrates full painless range of motion with regard to flexion and extension. Strength is 5/5 throughout all planes. Ligamentous stability is grossly intact. Examination of the left and right hip reveals intact skin. The patient demonstrates full painless range of motion with regards to flexion, abduction, internal and external rotation. There is no tenderness about the greater trochanter. There is a negative straight leg raise against resistance. Strength is 5/5 throughout all planes. Radiology:     X-rays obtained previously and reviewed in office:  Views 4 views including AP weightbearing, PA flexed weightbearing, lateral and skyline  Location right knee  Impression there is relatively well-maintained joint space of all 3 compartments with no evidence of any high-grade arthritic changes, fractures, dislocations. Impression:  Encounter Diagnosis   Name Primary?     Subluxation of right patella, initial encounter Yes       Office

## 2020-02-13 ENCOUNTER — HOSPITAL ENCOUNTER (OUTPATIENT)
Dept: GENERAL RADIOLOGY | Age: 45
Discharge: HOME | End: 2020-02-13
Payer: COMMERCIAL

## 2020-02-13 PROCEDURE — 73560 X-RAY EXAM OF KNEE 1 OR 2: CPT

## 2020-02-13 PROCEDURE — 72110 X-RAY EXAM L-2 SPINE 4/>VWS: CPT

## 2020-02-18 ENCOUNTER — HOSPITAL ENCOUNTER (OUTPATIENT)
Dept: MRI IMAGING | Age: 45
Discharge: HOME | End: 2020-02-18
Payer: COMMERCIAL

## 2020-02-18 PROCEDURE — 73721 MRI JNT OF LWR EXTRE W/O DYE: CPT

## 2020-09-22 ENCOUNTER — HOSPITAL ENCOUNTER (OUTPATIENT)
Dept: MRI IMAGING | Age: 45
Discharge: HOME | End: 2020-09-22
Payer: COMMERCIAL

## 2020-09-22 PROCEDURE — 72148 MRI LUMBAR SPINE W/O DYE: CPT

## 2020-10-07 ENCOUNTER — HOSPITAL ENCOUNTER (OUTPATIENT)
Dept: WOMENS IMAGING | Age: 45
Discharge: HOME | End: 2020-10-07
Payer: COMMERCIAL

## 2020-10-07 PROCEDURE — 77063 BREAST TOMOSYNTHESIS BI: CPT

## 2021-07-05 ENCOUNTER — HOSPITAL ENCOUNTER (OUTPATIENT)
Dept: GENERAL RADIOLOGY | Age: 46
Discharge: HOME | End: 2021-07-05
Payer: COMMERCIAL

## 2021-07-05 ENCOUNTER — HOSPITAL ENCOUNTER (OUTPATIENT)
Age: 46
Discharge: HOME | End: 2021-07-05
Payer: COMMERCIAL

## 2021-07-05 DIAGNOSIS — M25.552 LEFT HIP PAIN: ICD-10-CM

## 2021-07-05 PROCEDURE — 73502 X-RAY EXAM HIP UNI 2-3 VIEWS: CPT

## 2021-07-18 ENCOUNTER — APPOINTMENT (OUTPATIENT)
Dept: CT IMAGING | Age: 46
End: 2021-07-18
Payer: COMMERCIAL

## 2021-07-18 ENCOUNTER — HOSPITAL ENCOUNTER (OUTPATIENT)
Age: 46
Setting detail: SPECIMEN
Discharge: HOME | End: 2021-07-18
Payer: COMMERCIAL

## 2021-07-18 ENCOUNTER — HOSPITAL ENCOUNTER (EMERGENCY)
Age: 46
Discharge: HOME | End: 2021-07-18
Attending: EMERGENCY MEDICINE
Payer: COMMERCIAL

## 2021-07-18 VITALS
TEMPERATURE: 98.4 F | HEART RATE: 93 BPM | DIASTOLIC BLOOD PRESSURE: 75 MMHG | RESPIRATION RATE: 16 BRPM | HEIGHT: 65 IN | SYSTOLIC BLOOD PRESSURE: 115 MMHG | OXYGEN SATURATION: 97 % | BODY MASS INDEX: 44.65 KG/M2 | WEIGHT: 268 LBS

## 2021-07-18 DIAGNOSIS — R91.1 PULMONARY NODULE: ICD-10-CM

## 2021-07-18 DIAGNOSIS — K52.9 ENTERITIS: Primary | ICD-10-CM

## 2021-07-18 DIAGNOSIS — R11.0 NAUSEA: ICD-10-CM

## 2021-07-18 DIAGNOSIS — K42.9 UMBILICAL HERNIA WITHOUT OBSTRUCTION AND WITHOUT GANGRENE: ICD-10-CM

## 2021-07-18 DIAGNOSIS — M25.552 LEFT HIP PAIN: ICD-10-CM

## 2021-07-18 DIAGNOSIS — R10.9 ABDOMINAL CRAMPING: ICD-10-CM

## 2021-07-18 DIAGNOSIS — R19.7 DIARRHEA, UNSPECIFIED TYPE: ICD-10-CM

## 2021-07-18 LAB
A/G RATIO: 1.5 (ref 1.1–2.2)
ALBUMIN SERPL-MCNC: 4.6 G/DL (ref 3.4–5)
ALP BLD-CCNC: 93 U/L (ref 40–129)
ALT SERPL-CCNC: 22 U/L (ref 10–40)
ANION GAP SERPL CALCULATED.3IONS-SCNC: 11 MMOL/L (ref 3–16)
AST SERPL-CCNC: 17 U/L (ref 15–37)
BASOPHILS ABSOLUTE: 0.1 K/UL (ref 0–0.2)
BASOPHILS RELATIVE PERCENT: 1.2 %
BILIRUB SERPL-MCNC: 0.4 MG/DL (ref 0–1)
BILIRUBIN URINE: NEGATIVE
BLOOD, URINE: ABNORMAL
BUN BLDV-MCNC: 12 MG/DL (ref 7–20)
C DIFF TOXIN/ANTIGEN: NORMAL
CALCIUM SERPL-MCNC: 10 MG/DL (ref 8.3–10.6)
CHLORIDE BLD-SCNC: 99 MMOL/L (ref 99–110)
CLARITY: CLEAR
CO2: 25 MMOL/L (ref 21–32)
COLOR: YELLOW
CREAT SERPL-MCNC: 0.7 MG/DL (ref 0.6–1.1)
EOSINOPHILS ABSOLUTE: 0.1 K/UL (ref 0–0.6)
EOSINOPHILS RELATIVE PERCENT: 1.2 %
EPITHELIAL CELLS, UA: NORMAL /HPF (ref 0–5)
GFR AFRICAN AMERICAN: >60
GFR NON-AFRICAN AMERICAN: >60
GLOBULIN: 3.1 G/DL
GLUCOSE BLD-MCNC: 105 MG/DL (ref 70–99)
GLUCOSE URINE: NEGATIVE MG/DL
HCT VFR BLD CALC: 40.8 % (ref 36–48)
HEMOGLOBIN: 14.3 G/DL (ref 12–16)
KETONES, URINE: NEGATIVE MG/DL
LACTIC ACID: 1.4 MMOL/L (ref 0.4–2)
LEUKOCYTE ESTERASE, URINE: NEGATIVE
LIPASE: 34 U/L (ref 13–60)
LYMPHOCYTES ABSOLUTE: 2.4 K/UL (ref 1–5.1)
LYMPHOCYTES RELATIVE PERCENT: 25.5 %
MAGNESIUM: 2.1 MG/DL (ref 1.8–2.4)
MCH RBC QN AUTO: 29.4 PG (ref 26–34)
MCHC RBC AUTO-ENTMCNC: 35.1 G/DL (ref 31–36)
MCV RBC AUTO: 83.7 FL (ref 80–100)
MICROSCOPIC EXAMINATION: YES
MONOCYTES ABSOLUTE: 0.6 K/UL (ref 0–1.3)
MONOCYTES RELATIVE PERCENT: 6.8 %
NEUTROPHILS ABSOLUTE: 6.1 K/UL (ref 1.7–7.7)
NEUTROPHILS RELATIVE PERCENT: 65.3 %
NITRITE, URINE: NEGATIVE
PDW BLD-RTO: 15.5 % (ref 12.4–15.4)
PH UA: 5.5 (ref 5–8)
PLATELET # BLD: 370 K/UL (ref 135–450)
PMV BLD AUTO: 9 FL (ref 5–10.5)
POTASSIUM SERPL-SCNC: 4.4 MMOL/L (ref 3.5–5.1)
PROTEIN UA: NEGATIVE MG/DL
RBC # BLD: 4.87 M/UL (ref 4–5.2)
RBC UA: NORMAL /HPF (ref 0–4)
SODIUM BLD-SCNC: 135 MMOL/L (ref 136–145)
SPECIFIC GRAVITY UA: 1.01 (ref 1–1.03)
TOTAL PROTEIN: 7.7 G/DL (ref 6.4–8.2)
TROPONIN: <0.01 NG/ML
URINE TYPE: ABNORMAL
UROBILINOGEN, URINE: 0.2 E.U./DL
WBC # BLD: 9.3 K/UL (ref 4–11)
WBC UA: NORMAL /HPF (ref 0–5)
WHITE BLOOD CELLS (WBC), STOOL: NORMAL

## 2021-07-18 PROCEDURE — 81001 URINALYSIS AUTO W/SCOPE: CPT

## 2021-07-18 PROCEDURE — 6360000004 HC RX CONTRAST MEDICATION: Performed by: EMERGENCY MEDICINE

## 2021-07-18 PROCEDURE — 85025 COMPLETE CBC W/AUTO DIFF WBC: CPT

## 2021-07-18 PROCEDURE — 6360000002 HC RX W HCPCS: Performed by: EMERGENCY MEDICINE

## 2021-07-18 PROCEDURE — 96361 HYDRATE IV INFUSION ADD-ON: CPT

## 2021-07-18 PROCEDURE — 80053 COMPREHEN METABOLIC PANEL: CPT

## 2021-07-18 PROCEDURE — 2580000003 HC RX 258: Performed by: EMERGENCY MEDICINE

## 2021-07-18 PROCEDURE — 2500000003 HC RX 250 WO HCPCS: Performed by: EMERGENCY MEDICINE

## 2021-07-18 PROCEDURE — 87505 NFCT AGENT DETECTION GI: CPT

## 2021-07-18 PROCEDURE — 96375 TX/PRO/DX INJ NEW DRUG ADDON: CPT

## 2021-07-18 PROCEDURE — 87324 CLOSTRIDIUM AG IA: CPT

## 2021-07-18 PROCEDURE — 83630 LACTOFERRIN FECAL (QUAL): CPT

## 2021-07-18 PROCEDURE — 6370000000 HC RX 637 (ALT 250 FOR IP): Performed by: EMERGENCY MEDICINE

## 2021-07-18 PROCEDURE — 74177 CT ABD & PELVIS W/CONTRAST: CPT

## 2021-07-18 PROCEDURE — 83605 ASSAY OF LACTIC ACID: CPT

## 2021-07-18 PROCEDURE — 83690 ASSAY OF LIPASE: CPT

## 2021-07-18 PROCEDURE — 83735 ASSAY OF MAGNESIUM: CPT

## 2021-07-18 PROCEDURE — 99283 EMERGENCY DEPT VISIT LOW MDM: CPT

## 2021-07-18 PROCEDURE — 87449 NOS EACH ORGANISM AG IA: CPT

## 2021-07-18 PROCEDURE — 84484 ASSAY OF TROPONIN QUANT: CPT

## 2021-07-18 PROCEDURE — 96374 THER/PROPH/DIAG INJ IV PUSH: CPT

## 2021-07-18 RX ORDER — DICYCLOMINE HCL 20 MG
20 TABLET ORAL ONCE
Status: COMPLETED | OUTPATIENT
Start: 2021-07-18 | End: 2021-07-18

## 2021-07-18 RX ORDER — LOSARTAN POTASSIUM 25 MG/1
25 TABLET ORAL DAILY
COMMUNITY

## 2021-07-18 RX ORDER — OXYCODONE HYDROCHLORIDE 5 MG/1
5 TABLET ORAL ONCE
Status: COMPLETED | OUTPATIENT
Start: 2021-07-18 | End: 2021-07-18

## 2021-07-18 RX ORDER — MORPHINE SULFATE 4 MG/ML
4 INJECTION, SOLUTION INTRAMUSCULAR; INTRAVENOUS ONCE
Status: COMPLETED | OUTPATIENT
Start: 2021-07-18 | End: 2021-07-18

## 2021-07-18 RX ORDER — 0.9 % SODIUM CHLORIDE 0.9 %
1000 INTRAVENOUS SOLUTION INTRAVENOUS ONCE
Status: COMPLETED | OUTPATIENT
Start: 2021-07-18 | End: 2021-07-18

## 2021-07-18 RX ORDER — FAMOTIDINE 40 MG/1
40 TABLET, FILM COATED ORAL NIGHTLY
COMMUNITY

## 2021-07-18 RX ORDER — METHOCARBAMOL 100 MG/ML
1000 INJECTION, SOLUTION INTRAMUSCULAR; INTRAVENOUS EVERY 6 HOURS
COMMUNITY
End: 2021-08-24

## 2021-07-18 RX ORDER — ONDANSETRON 2 MG/ML
4 INJECTION INTRAMUSCULAR; INTRAVENOUS ONCE
Status: COMPLETED | OUTPATIENT
Start: 2021-07-18 | End: 2021-07-18

## 2021-07-18 RX ADMIN — IOPAMIDOL 75 ML: 755 INJECTION, SOLUTION INTRAVENOUS at 12:23

## 2021-07-18 RX ADMIN — OXYCODONE 5 MG: 5 TABLET ORAL at 14:56

## 2021-07-18 RX ADMIN — MORPHINE SULFATE 4 MG: 4 INJECTION, SOLUTION INTRAMUSCULAR; INTRAVENOUS at 11:57

## 2021-07-18 RX ADMIN — DICYCLOMINE HYDROCHLORIDE 20 MG: 20 TABLET ORAL at 11:58

## 2021-07-18 RX ADMIN — ONDANSETRON 4 MG: 2 INJECTION INTRAMUSCULAR; INTRAVENOUS at 11:16

## 2021-07-18 RX ADMIN — FAMOTIDINE 20 MG: 10 INJECTION, SOLUTION INTRAVENOUS at 11:58

## 2021-07-18 RX ADMIN — SODIUM CHLORIDE 1000 ML: 9 INJECTION, SOLUTION INTRAVENOUS at 11:15

## 2021-07-18 ASSESSMENT — PAIN SCALES - GENERAL
PAINLEVEL_OUTOF10: 9
PAINLEVEL_OUTOF10: 8
PAINLEVEL_OUTOF10: 3

## 2021-07-18 ASSESSMENT — ENCOUNTER SYMPTOMS
SHORTNESS OF BREATH: 0
DIARRHEA: 1
BLOOD IN STOOL: 0
ANAL BLEEDING: 1
ABDOMINAL DISTENTION: 0
ABDOMINAL PAIN: 1
RECTAL PAIN: 0
CHEST TIGHTNESS: 0
NAUSEA: 1
VOMITING: 0
CONSTIPATION: 0

## 2021-07-18 NOTE — ED PROVIDER NOTES
[facility]  ED  EMERGENCY DEPARTMENT ENCOUNTER        Pt Name: Seth Henderson  MRN: [id number]  [place] 1975  Date of evaluation: 7/18/2021  Provider: Veronika Montiel MD  PCP: Lizbeth Hyde MD      [address] [place] [address]       Chief Complaint   Patient presents with    Diarrhea     for four days - Taking Lamotil and Bentyl and not stopping. HX of IBS    Hip Pain     LEFT, has hx of bursitis, steroid shots in hip       HISTORY OFPRESENT ILLNESS   (Location/Symptom, Timing/Onset, Context/Setting, Quality, Duration, Modifying Factors,Severity)  Note limiting factors. Seth Henderson is a 48 y.o. female presenting today due to concern for significant diarrhea for the last 4 days where she has 8-10 bowel movements per day. She has multiple medications at home related to IBS with key component of diarrhea including Bentyl along with Lomotil and Percocet but states these did not seem to be helping. She denies any fever or chills. No chest pain or shortness of breath. She occasionally is nauseated with the abdominal cramping. She is on Xarelto due to a history of blood clots. She denies any abnormal leg swelling. She does complain of left hip pain that has been going on for the last 3 to 4 weeks and she is seeing orthopedics related to this and has received injections already related to the pain. No acute changes with her hip today. No current numbness or weakness in the feet although she does have some trouble walking due to the left hip pain. No new back pain. No headache. Due to persistent diarrhea, she came to the ED for further evaluation. [facility] but states that her doctors moved away from the area and she needs to find a new GI. She is on Humira due to history of psoriasis. She reports having a hemorrhoid and does see some blood on the toilet paper when wiping but denies any actual blood in the stool.         REVIEW OF SYSTEMS    (2-9 systems for level 4, 10 or more for level 5)     Review of Systems   Constitutional: Negative for chills, diaphoresis, fatigue and fever. HENT: Negative for congestion. Respiratory: Negative for chest tightness and shortness of breath. Cardiovascular: Negative for chest pain. Gastrointestinal: Positive for abdominal pain (cramping throughout), anal bleeding (minimal on toilet paper, patient declining rectal exam today), diarrhea and nausea. Negative for abdominal distention, blood in stool, constipation, rectal pain and vomiting. Genitourinary: Negative for dysuria and flank pain. Musculoskeletal: Positive for arthralgias (left hip) and gait problem (due to left hip pain for the past 3-4 weeks). Negative for neck pain and neck stiffness. Neurological: Positive for weakness (generalized). Negative for syncope, light-headedness and headaches. Hematological: Bruises/bleeds easily (on Xarelto). Psychiatric/Behavioral: Negative for confusion. Positives and Pertinent negatives as per HPI.       PASTMEDICAL HISTORY     Past Medical History:   Diagnosis Date    Abdominal pain, other specified site 2013    Anemia     Anxiety     Chronic GERD 2016    Chronic nausea     Clostridium difficile diarrhea 2013    4 positive tests in     Depression     Fibromyalgia     GERD (gastroesophageal reflux disease)     Morbid obesity with BMI of 40.0-44.9, adult ([facility]     Neuroma digital nerve 2016    PONV (postoperative nausea and vomiting)     Primary osteoarthritis of right knee 2/10/2016    Sleep apnea     Thyroid disease     hypothyroidism         SURGICAL HISTORY       Past Surgical History:   Procedure Laterality Date    BUNIONECTOMY Right      SECTION      x 2    CHOLECYSTECTOMY      COLONOSCOPY  13    NOT COMPLETE    CYSTOSCOPY      DILATION AND CURETTAGE OF UTERUS      FOOT SURGERY      neuroma and lesion excision    FOOT SURGERY Right 2016    HEMORRHOID SURGERY      HERNIA REPAIR      bilateral inguinal X3    HYSTERECTOMY      LAPAROSCOPY      OTHER SURGICAL HISTORY      Fecal transplant    OTHER SURGICAL HISTORY  10/10/2018    cystoscopy, urethral dilatation    DE CYSTOSCOPY,DIL URETHRAL STRICTURE N/A 10/10/2018    CYSTOSCOPY, URETHRAL DILATATION performed by Antonietta Baeza MD at [facility]  05/12/2017         CURRENT MEDICATIONS       Discharge Medication List as of 7/18/2021  3:02 PM      CONTINUE these medications which have NOT CHANGED    Details   famotidine (PEPCID) 40 MG tablet Take 40 mg by mouth nightly as neededHistorical Med      losartan (COZAAR) 25 MG tablet Take 25 mg by mouth dailyHistorical Med      methocarbamol (ROBAXIN) 1000 MG/10ML injection Infuse 1,000 mg intravenously onceHistorical Med      adalimumab (HUMIRA) 40 MG/0.8ML injection Inject 20 mg into the skin every 14 daysHistorical Med      cloNIDine (CATAPRES) 0.2 MG tablet TK 1 T PO  QD, R-0Historical Med      desvenlafaxine succinate (PRISTIQ) 50 MG TB24 extended release tablet R-0Historical Med      vitamin D (ERGOCALCIFEROL) 56821 units CAPS capsule R-0Historical Med      promethazine (PHENERGAN) 25 MG tablet TK 1 T PO Q 6 H PRN, R-2Historical Med      oxyCODONE-acetaminophen (PERCOCET) 7.5-325 MG per tablet Take 1 tablet by mouth 4 times daily as needed., R-0Historical Med      lidocaine (LIDODERM) 5 % Place 1 patch onto the skin daily 12 hours on, 12 hours off., Disp-30 patch, R-0Print      cetirizine (ZYRTEC) 10 MG tablet TAKE 1 TABLET BY MOUTH DAILY, Disp-90 tablet, R-0Normal      LYRICA 150 MG capsule Take 1 capsule by mouth 2 times daily. ., R-2, DAWHistorical Med      busPIRone (BUSPAR) 30 MG tablet Take 15 mg by mouth 3 times daily Historical Med      VENTOLIN  (90 Base) MCG/ACT inhaler INHALE 2 PUFFS BY MOUTH INTO THE LUNGS EVERY 6 HOURS AS NEEDED FOR WHEEZING OR SHORTNESS OF BREATH, Disp-1 Inhaler, R-2Normal Quit date: 12/2017     Years since quitting: 3.6    Smokeless tobacco: Never Used   Vaping Use    Vaping Use: Never used   Substance and Sexual Activity    Alcohol use: No    Drug use: No    Sexual activity: Never   Other Topics Concern    Not on file   Social History Narrative    ** Merged History Encounter **          Social Determinants of Health     Financial Resource Strain:     Difficulty of Paying Living Expenses:    Food Insecurity:     Worried About Running Out of Food in the Last Year:     Ran Out of Food in the Last Year:    Transportation Needs:     Lack of Transportation (Medical):  Lack of Transportation (Non-Medical):    Physical Activity:     Days of Exercise per Week:     Minutes of Exercise per Session:    Stress:     Feeling of Stress :    Social Connections:     Frequency of Communication with Friends and Family:     Frequency of Social Gatherings with Friends and Family:     Attends Sikh Services:     Active Member of Clubs or Organizations:     Attends Club or Organization Meetings:     Marital Status:    Intimate Partner Violence:     Fear of Current or Ex-Partner:     Emotionally Abused:     Physically Abused:     Sexually Abused:        SCREENINGS                PHYSICAL EXAM    (up to 7 for level 4, 8 or more for level 5)     ED Triage Vitals   BP Temp Temp src Pulse Resp SpO2 Height Weight   -- -- -- -- -- -- -- --       Physical Exam  Vitals and nursing note reviewed. Constitutional:       General: She is awake. She is not in acute distress. Appearance: Normal appearance. She is well-developed and well-groomed. She is morbidly obese. She is not ill-appearing, toxic-appearing or diaphoretic. Interventions: She is not intubated. HENT:      Head: Normocephalic and atraumatic. Right Ear: External ear normal.      Left Ear: External ear normal.      Nose: Nose normal.      Mouth/Throat:      Mouth: Mucous membranes are dry.    Eyes: General:         Right eye: No discharge. Left eye: No discharge. Neck:      Trachea: No tracheal deviation. Cardiovascular:      Rate and Rhythm: Normal rate and regular rhythm. Pulses: Normal pulses. Pulmonary:      Effort: Pulmonary effort is normal. No tachypnea, bradypnea, accessory muscle usage, prolonged expiration, respiratory distress or retractions. She is not intubated. Breath sounds: Normal breath sounds and air entry. No stridor, decreased air movement or transmitted upper airway sounds. No decreased breath sounds, wheezing, rhonchi or rales. Chest:      Chest wall: No tenderness. Abdominal:      General: Abdomen is flat. Bowel sounds are normal. There is no distension. Palpations: Abdomen is soft. Abdomen is not rigid. Tenderness: There is no abdominal tenderness. There is no right CVA tenderness, left CVA tenderness, guarding or rebound. Negative signs include Manjarrez's sign and McBurney's sign. Musculoskeletal:         General: No deformity or signs of injury. Cervical back: Normal, full passive range of motion without pain, normal range of motion and neck supple. No swelling, edema, deformity, erythema, signs of trauma, lacerations, rigidity, spasms, torticollis, tenderness, bony tenderness or crepitus. No pain with movement, spinous process tenderness or muscular tenderness. Normal range of motion. Thoracic back: No tenderness or bony tenderness. Lumbar back: No tenderness or bony tenderness. Right hip: Normal.      Left hip: Tenderness present. No deformity, lacerations or crepitus. Decreased range of motion. Right knee: Normal. No bony tenderness. Normal range of motion. No tenderness. Left knee: No bony tenderness. Normal range of motion. No tenderness. Right lower leg: No edema. Left lower leg: No edema. Right ankle: Normal.      Left ankle: Normal.   Skin:     General: Skin is warm and dry.       Coloration: Skin is not jaundiced or pale. Findings: No bruising, erythema, lesion or rash. Neurological:      General: No focal deficit present. Mental Status: She is alert and oriented to person, place, and time. Mental status is at baseline. GCS: GCS eye subscore is 4. GCS verbal subscore is 5. GCS motor subscore is 6. Sensory: No sensory deficit. Motor: No weakness, tremor, atrophy, abnormal muscle tone or seizure activity. Comments: Normal dorsiflexion and plantarflexion with bilateral ankles and great toes, sensation 5 out of 5 to bilateral lateral and medial legs  Limited exam with left leg due to moderate left hip pain and tenderness to palpation but left hip does have some range of motion although it is diminished compared to the right and patient reports this has been going on for a few weeks   Psychiatric:         Mood and Affect: Mood normal.         Behavior: Behavior normal. Behavior is cooperative.              DIAGNOSTIC RESULTS   :    Labs Reviewed   CBC WITH AUTO DIFFERENTIAL - Abnormal; Notable for the following components:       Result Value    RDW 15.5 (*)     All other components within normal limits    Narrative:     Performed at:  [address] [place] [address] [employer]   Phone [phone]   COMPREHENSIVE METABOLIC PANEL - Abnormal; Notable for the following components:    Sodium 135 (*)     Glucose 105 (*)     All other components within normal limits    Narrative:     Performed at:  [address] [place] [address] [employer]   Phone [phone]   URINALYSIS - Abnormal; Notable for the following components:    Blood, Urine TRACE-INTACT (*)     All other components within normal limits    Narrative:     Performed at:  [place] [address] [employer]   Phone [phone]   C DIFF TOXIN/ANTIGEN    Narrative:     ORDER#: [id number] ORDERED BY: GERARD SAMANO  SOURCE: Stool                              COLLECTED:  07/18/21 16:00  ANTIBIOTICS AT VERA.:                      RECEIVED :  07/18/21 16:46  Collect White vial (sterile container)  Performed at:  [place] [address] [facility]   Phone [phone]   FECAL LEUKOCYTES    Narrative:     ORDER#: [id number]                          ORDERED BY: Levi Cullen  SOURCE: Stool                              COLLECTED:  07/18/21 16:00  ANTIBIOTICS AT VERA.:                      RECEIVED :  07/18/21 16:45  Performed at:  [place] [address] [facility] [employer]   Phone [phone]   GASTROINTESTINAL PANEL, MOLECULAR   MAGNESIUM    Narrative:     Performed at:  [place] [address] [facility] [employer]   Phone [phone]   LACTIC ACID, PLASMA    Narrative:     Performed at:  [place] [address] [facility] [employer]   Phone [phone]   TROPONIN    Narrative:     Performed at:  [place] [address] [facility] [employer]   Phone [phone]   LIPASE    Narrative:     Performed at:  [place] [address] [facility] [employer]   Phone [phone]   MICROSCOPIC URINALYSIS    Narrative:     Performed at:  [place] [address] [facility] [employer]   Phone [phone]   BLOOD OCCULT STOOL SCREEN #1   BLOOD GAS, VENOUS       All other labs were within normal range or not returned asof this dictation. EKG:  All EKG's are interpreted by the Emergency Department Physician who either signs or Co-signs this chart in the absence of a cardiologist.        RADIOLOGY:   Non-plain film images such as CT, Ultrasound and MRI are read by the radiologist. Plainradiographic images mg Intravenous Given 7/18/21 1158)   iopamidol (ISOVUE-370) 76 % injection 75 mL (75 mLs Intravenous Given 7/18/21 1223)   oxyCODONE (ROXICODONE) immediate release tablet 5 mg (5 mg Oral Given 7/18/21 2016)     Patient was evaluated due to concern for worsening abdominal cramping with diarrhea over the last 4 days. Since she did complain of pain all over and nothing helping at home, I did order a CT scan which showed concern for enteritis but no intra-abdominal findings such as small bowel obstruction or ileus or appendicitis per radiologist.  Based on multiple drug allergies, and being more likely viral in origin or related to her IBS, we did not give antibiotics at this point. She also complained of some left hip pain over the last few weeks and still had some range of motion although was diminished compared to the right. This has been the worked up by orthopedics and at this point I have low suspicion for septic arthritis, especially without any fever or leukocytosis. She had minimal improvement of the pain in the ED. She was able to tolerate p.o. per nurse. she was able to walk per ER technician. At this point, patient is comfortable trying to go home but understands if anything worsens over the next 12 to 24 hours with increasing abdominal pain with vomiting, new onset chest pain, fever, inability to walk, then return to the ED for repeat assessment, but otherwise follow-up with GI in the next couple of days for her diarrhea and with orthopedics over the next week in regards to her left hip pain. She was in no acute distress and well-appearing at time of discharge and felt comfortable with this plan. She reported having all the prescriptions at home that I would have sent her home on. The patient tolerated their visit well. The patient and / or the family were informed of the results of any tests, a time was given to answer questions. FINAL IMPRESSION      1. Enteritis    2.  Abdominal cramping    3. Nausea    4. Left hip pain    5. Pulmonary nodule    6. Umbilical hernia without obstruction and without gangrene    7.  Diarrhea, unspecified type          DISPOSITION/PLAN   DISPOSITION Decision To Discharge 07/18/2021 03:01:41 PM      PATIENT REFERRED TO:  [facility]  ED  [address]  [address] [facility] [address]  Go to   If symptoms worsen    Amrita Busby MD  [facility]  [phone]    In 3 days      Lebron Weeks MD  [facility] [place] [phone]    In 2 days  Call the office tomorrow to make an appointment in 1-3 days    [address]  [address]  [phone]    In 3 days  See your orthopedic specialist for your hip, you may need an urgent MRI      DISCHARGEMEDICATIONS:  Discharge Medication List as of 7/18/2021  3:02 PM          DISCONTINUED MEDICATIONS:  Discharge Medication List as of 7/18/2021  3:02 PM      STOP taking these medications       atorvastatin (LIPITOR) 10 MG tablet Comments:   Reason for Stopping:                      (Please note that portions of this note were completed with a voicerecognition program.  Efforts were made to edit the dictations but occasionally words are mis-transcribed.)    Gail Randall MD (electronically signed)            Gail Randall MD  07/18/21 3509  Broderick Frost MD  08/24/21 2789

## 2021-07-18 NOTE — ED NOTES
Pt discharge education provided - Pt verbalized understanding and was able to ambulate from ED     Darling Concepcion RN  07/18/21 8717

## 2021-07-18 NOTE — ED NOTES
Ambulate [atient in garcía around 100 feet. She walk slowly,because of the hip pain.      Dimitris Adams  07/18/21 9798

## 2021-07-19 LAB — GI BACTERIAL PATHOGENS BY PCR: NORMAL

## 2021-07-20 ENCOUNTER — HOSPITAL ENCOUNTER (OUTPATIENT)
Age: 46
Setting detail: OBSERVATION
Discharge: HOME | End: 2021-07-21
Attending: EMERGENCY MEDICINE | Admitting: INTERNAL MEDICINE
Payer: COMMERCIAL

## 2021-07-20 DIAGNOSIS — R19.7 DIARRHEA, UNSPECIFIED TYPE: Primary | ICD-10-CM

## 2021-07-20 DIAGNOSIS — R10.9 INTRACTABLE ABDOMINAL PAIN: ICD-10-CM

## 2021-07-20 LAB
A/G RATIO: 1.3 (ref 1.1–2.2)
ALBUMIN SERPL-MCNC: 4.1 G/DL (ref 3.4–5)
ALP BLD-CCNC: 92 U/L (ref 40–129)
ALT SERPL-CCNC: 17 U/L (ref 10–40)
ANION GAP SERPL CALCULATED.3IONS-SCNC: 12 MMOL/L (ref 3–16)
AST SERPL-CCNC: 10 U/L (ref 15–37)
BACTERIA: ABNORMAL /HPF
BASOPHILS ABSOLUTE: 0.1 K/UL (ref 0–0.2)
BASOPHILS RELATIVE PERCENT: 1.1 %
BILIRUB SERPL-MCNC: 0.4 MG/DL (ref 0–1)
BILIRUBIN URINE: NEGATIVE
BLOOD, URINE: ABNORMAL
BUN BLDV-MCNC: 10 MG/DL (ref 7–20)
C DIFF TOXIN/ANTIGEN: NORMAL
CALCIUM SERPL-MCNC: 9.5 MG/DL (ref 8.3–10.6)
CHLORIDE BLD-SCNC: 103 MMOL/L (ref 99–110)
CLARITY: CLEAR
CO2: 21 MMOL/L (ref 21–32)
COLOR: YELLOW
CREAT SERPL-MCNC: 0.7 MG/DL (ref 0.6–1.1)
EOSINOPHILS ABSOLUTE: 0.2 K/UL (ref 0–0.6)
EOSINOPHILS RELATIVE PERCENT: 1.5 %
EPITHELIAL CELLS, UA: ABNORMAL /HPF (ref 0–5)
GFR AFRICAN AMERICAN: >60
GFR NON-AFRICAN AMERICAN: >60
GLOBULIN: 3.1 G/DL
GLUCOSE BLD-MCNC: 119 MG/DL (ref 70–99)
GLUCOSE URINE: NEGATIVE MG/DL
HCT VFR BLD CALC: 40.2 % (ref 36–48)
HEMOGLOBIN: 14.1 G/DL (ref 12–16)
HYALINE CASTS: ABNORMAL /LPF (ref 0–2)
KETONES, URINE: NEGATIVE MG/DL
LACTIC ACID, SEPSIS: 2.1 MMOL/L (ref 0.4–1.9)
LEUKOCYTE ESTERASE, URINE: NEGATIVE
LIPASE: 27 U/L (ref 13–60)
LYMPHOCYTES ABSOLUTE: 2.4 K/UL (ref 1–5.1)
LYMPHOCYTES RELATIVE PERCENT: 21.6 %
MCH RBC QN AUTO: 29.9 PG (ref 26–34)
MCHC RBC AUTO-ENTMCNC: 35 G/DL (ref 31–36)
MCV RBC AUTO: 85.4 FL (ref 80–100)
MICROSCOPIC EXAMINATION: YES
MONOCYTES ABSOLUTE: 0.6 K/UL (ref 0–1.3)
MONOCYTES RELATIVE PERCENT: 5.6 %
NEUTROPHILS ABSOLUTE: 7.7 K/UL (ref 1.7–7.7)
NEUTROPHILS RELATIVE PERCENT: 70.2 %
NITRITE, URINE: NEGATIVE
PDW BLD-RTO: 15.7 % (ref 12.4–15.4)
PH UA: 5.5 (ref 5–8)
PLATELET # BLD: 384 K/UL (ref 135–450)
PMV BLD AUTO: 8.2 FL (ref 5–10.5)
POTASSIUM SERPL-SCNC: 3.8 MMOL/L (ref 3.5–5.1)
PROTEIN UA: NEGATIVE MG/DL
RBC # BLD: 4.71 M/UL (ref 4–5.2)
RBC UA: ABNORMAL /HPF (ref 0–4)
SODIUM BLD-SCNC: 136 MMOL/L (ref 136–145)
SPECIFIC GRAVITY UA: 1.02 (ref 1–1.03)
TOTAL PROTEIN: 7.2 G/DL (ref 6.4–8.2)
URINE TYPE: ABNORMAL
UROBILINOGEN, URINE: 0.2 E.U./DL
WBC # BLD: 11 K/UL (ref 4–11)
WBC UA: ABNORMAL /HPF (ref 0–5)

## 2021-07-20 PROCEDURE — 96376 TX/PRO/DX INJ SAME DRUG ADON: CPT

## 2021-07-20 PROCEDURE — 87336 ENTAMOEB HIST DISPR AG IA: CPT

## 2021-07-20 PROCEDURE — 87449 NOS EACH ORGANISM AG IA: CPT

## 2021-07-20 PROCEDURE — G0378 HOSPITAL OBSERVATION PER HR: HCPCS

## 2021-07-20 PROCEDURE — 99284 EMERGENCY DEPT VISIT MOD MDM: CPT

## 2021-07-20 PROCEDURE — 84999 UNLISTED CHEMISTRY PROCEDURE: CPT

## 2021-07-20 PROCEDURE — 84302 ASSAY OF SWEAT SODIUM: CPT

## 2021-07-20 PROCEDURE — 82705 FATS/LIPIDS FECES QUAL: CPT

## 2021-07-20 PROCEDURE — 87329 GIARDIA AG IA: CPT

## 2021-07-20 PROCEDURE — 96375 TX/PRO/DX INJ NEW DRUG ADDON: CPT

## 2021-07-20 PROCEDURE — 6370000000 HC RX 637 (ALT 250 FOR IP): Performed by: INTERNAL MEDICINE

## 2021-07-20 PROCEDURE — 81001 URINALYSIS AUTO W/SCOPE: CPT

## 2021-07-20 PROCEDURE — 83690 ASSAY OF LIPASE: CPT

## 2021-07-20 PROCEDURE — 87328 CRYPTOSPORIDIUM AG IA: CPT

## 2021-07-20 PROCEDURE — 96374 THER/PROPH/DIAG INJ IV PUSH: CPT

## 2021-07-20 PROCEDURE — 83605 ASSAY OF LACTIC ACID: CPT

## 2021-07-20 PROCEDURE — 2500000003 HC RX 250 WO HCPCS: Performed by: INTERNAL MEDICINE

## 2021-07-20 PROCEDURE — 85025 COMPLETE CBC W/AUTO DIFF WBC: CPT

## 2021-07-20 PROCEDURE — 80053 COMPREHEN METABOLIC PANEL: CPT

## 2021-07-20 PROCEDURE — 2580000003 HC RX 258: Performed by: EMERGENCY MEDICINE

## 2021-07-20 PROCEDURE — 36415 COLL VENOUS BLD VENIPUNCTURE: CPT

## 2021-07-20 PROCEDURE — 6360000002 HC RX W HCPCS: Performed by: EMERGENCY MEDICINE

## 2021-07-20 PROCEDURE — 87324 CLOSTRIDIUM AG IA: CPT

## 2021-07-20 PROCEDURE — 96361 HYDRATE IV INFUSION ADD-ON: CPT

## 2021-07-20 PROCEDURE — 83993 ASSAY FOR CALPROTECTIN FECAL: CPT

## 2021-07-20 RX ORDER — SODIUM CHLORIDE 0.9 % (FLUSH) 0.9 %
5-40 SYRINGE (ML) INJECTION PRN
Status: DISCONTINUED | OUTPATIENT
Start: 2021-07-20 | End: 2021-07-21 | Stop reason: HOSPADM

## 2021-07-20 RX ORDER — FAMOTIDINE 20 MG/1
40 TABLET, FILM COATED ORAL 2 TIMES DAILY
Status: DISCONTINUED | OUTPATIENT
Start: 2021-07-20 | End: 2021-07-21 | Stop reason: HOSPADM

## 2021-07-20 RX ORDER — DESVENLAFAXINE 50 MG/1
100 TABLET, EXTENDED RELEASE ORAL DAILY
Status: DISCONTINUED | OUTPATIENT
Start: 2021-07-21 | End: 2021-07-21 | Stop reason: HOSPADM

## 2021-07-20 RX ORDER — OXYCODONE AND ACETAMINOPHEN 7.5; 325 MG/1; MG/1
1 TABLET ORAL EVERY 6 HOURS PRN
Status: DISCONTINUED | OUTPATIENT
Start: 2021-07-20 | End: 2021-07-21 | Stop reason: HOSPADM

## 2021-07-20 RX ORDER — ONDANSETRON 2 MG/ML
4 INJECTION INTRAMUSCULAR; INTRAVENOUS EVERY 6 HOURS PRN
Status: DISCONTINUED | OUTPATIENT
Start: 2021-07-20 | End: 2021-07-21 | Stop reason: HOSPADM

## 2021-07-20 RX ORDER — MORPHINE SULFATE 4 MG/ML
4 INJECTION, SOLUTION INTRAMUSCULAR; INTRAVENOUS ONCE
Status: COMPLETED | OUTPATIENT
Start: 2021-07-20 | End: 2021-07-20

## 2021-07-20 RX ORDER — SCOLOPAMINE TRANSDERMAL SYSTEM 1 MG/1
1 PATCH, EXTENDED RELEASE TRANSDERMAL
Status: DISCONTINUED | OUTPATIENT
Start: 2021-07-20 | End: 2021-07-21 | Stop reason: HOSPADM

## 2021-07-20 RX ORDER — POLYETHYLENE GLYCOL 3350 17 G/17G
17 POWDER, FOR SOLUTION ORAL DAILY PRN
Status: DISCONTINUED | OUTPATIENT
Start: 2021-07-20 | End: 2021-07-21 | Stop reason: HOSPADM

## 2021-07-20 RX ORDER — BUSPIRONE HYDROCHLORIDE 5 MG/1
15 TABLET ORAL 3 TIMES DAILY
Status: DISCONTINUED | OUTPATIENT
Start: 2021-07-20 | End: 2021-07-20

## 2021-07-20 RX ORDER — DEXTROSE, SODIUM CHLORIDE, AND POTASSIUM CHLORIDE 5; .45; .15 G/100ML; G/100ML; G/100ML
INJECTION INTRAVENOUS CONTINUOUS
Status: DISPENSED | OUTPATIENT
Start: 2021-07-20 | End: 2021-07-21

## 2021-07-20 RX ORDER — CALCIUM CARBONATE 200(500)MG
500 TABLET,CHEWABLE ORAL 3 TIMES DAILY PRN
Status: DISCONTINUED | OUTPATIENT
Start: 2021-07-20 | End: 2021-07-21 | Stop reason: HOSPADM

## 2021-07-20 RX ORDER — METHOCARBAMOL 500 MG/1
1000 TABLET, FILM COATED ORAL EVERY 6 HOURS PRN
Status: DISCONTINUED | OUTPATIENT
Start: 2021-07-20 | End: 2021-07-21 | Stop reason: HOSPADM

## 2021-07-20 RX ORDER — ONDANSETRON 2 MG/ML
4 INJECTION INTRAMUSCULAR; INTRAVENOUS ONCE
Status: COMPLETED | OUTPATIENT
Start: 2021-07-20 | End: 2021-07-20

## 2021-07-20 RX ORDER — DICYCLOMINE HCL 20 MG
20 TABLET ORAL
Status: DISCONTINUED | OUTPATIENT
Start: 2021-07-20 | End: 2021-07-20

## 2021-07-20 RX ORDER — CETIRIZINE HYDROCHLORIDE 10 MG/1
10 TABLET ORAL DAILY
Status: DISCONTINUED | OUTPATIENT
Start: 2021-07-20 | End: 2021-07-21 | Stop reason: HOSPADM

## 2021-07-20 RX ORDER — LOSARTAN POTASSIUM 25 MG/1
25 TABLET ORAL DAILY
Status: DISCONTINUED | OUTPATIENT
Start: 2021-07-20 | End: 2021-07-21 | Stop reason: HOSPADM

## 2021-07-20 RX ORDER — 0.9 % SODIUM CHLORIDE 0.9 %
1000 INTRAVENOUS SOLUTION INTRAVENOUS ONCE
Status: COMPLETED | OUTPATIENT
Start: 2021-07-20 | End: 2021-07-20

## 2021-07-20 RX ORDER — ACETAMINOPHEN 325 MG/1
650 TABLET ORAL EVERY 6 HOURS PRN
Status: DISCONTINUED | OUTPATIENT
Start: 2021-07-20 | End: 2021-07-21 | Stop reason: HOSPADM

## 2021-07-20 RX ORDER — DESVENLAFAXINE 50 MG/1
50 TABLET, EXTENDED RELEASE ORAL DAILY
Status: DISCONTINUED | OUTPATIENT
Start: 2021-07-20 | End: 2021-07-20

## 2021-07-20 RX ORDER — PREGABALIN 75 MG/1
150 CAPSULE ORAL 2 TIMES DAILY
Status: DISCONTINUED | OUTPATIENT
Start: 2021-07-20 | End: 2021-07-21 | Stop reason: HOSPADM

## 2021-07-20 RX ORDER — SODIUM CHLORIDE 9 MG/ML
25 INJECTION, SOLUTION INTRAVENOUS PRN
Status: DISCONTINUED | OUTPATIENT
Start: 2021-07-20 | End: 2021-07-21 | Stop reason: HOSPADM

## 2021-07-20 RX ORDER — BUSPIRONE HYDROCHLORIDE 5 MG/1
30 TABLET ORAL 2 TIMES DAILY
Status: DISCONTINUED | OUTPATIENT
Start: 2021-07-21 | End: 2021-07-21 | Stop reason: HOSPADM

## 2021-07-20 RX ORDER — METHOCARBAMOL 100 MG/ML
1000 INJECTION, SOLUTION INTRAMUSCULAR; INTRAVENOUS ONCE
Status: DISCONTINUED | OUTPATIENT
Start: 2021-07-20 | End: 2021-07-20

## 2021-07-20 RX ORDER — CLONIDINE HYDROCHLORIDE 0.1 MG/1
0.2 TABLET ORAL 2 TIMES DAILY
Status: DISCONTINUED | OUTPATIENT
Start: 2021-07-20 | End: 2021-07-21 | Stop reason: HOSPADM

## 2021-07-20 RX ORDER — ALBUTEROL SULFATE 90 UG/1
1 AEROSOL, METERED RESPIRATORY (INHALATION) EVERY 4 HOURS PRN
Status: DISCONTINUED | OUTPATIENT
Start: 2021-07-20 | End: 2021-07-21 | Stop reason: HOSPADM

## 2021-07-20 RX ORDER — SODIUM CHLORIDE 0.9 % (FLUSH) 0.9 %
5-40 SYRINGE (ML) INJECTION EVERY 12 HOURS SCHEDULED
Status: DISCONTINUED | OUTPATIENT
Start: 2021-07-20 | End: 2021-07-21 | Stop reason: HOSPADM

## 2021-07-20 RX ORDER — DICYCLOMINE HCL 20 MG
40 TABLET ORAL 3 TIMES DAILY
Status: DISCONTINUED | OUTPATIENT
Start: 2021-07-20 | End: 2021-07-21 | Stop reason: HOSPADM

## 2021-07-20 RX ORDER — ONDANSETRON 4 MG/1
4 TABLET, ORALLY DISINTEGRATING ORAL EVERY 8 HOURS PRN
Status: DISCONTINUED | OUTPATIENT
Start: 2021-07-20 | End: 2021-07-21 | Stop reason: HOSPADM

## 2021-07-20 RX ORDER — ACETAMINOPHEN 650 MG/1
650 SUPPOSITORY RECTAL EVERY 6 HOURS PRN
Status: DISCONTINUED | OUTPATIENT
Start: 2021-07-20 | End: 2021-07-21 | Stop reason: HOSPADM

## 2021-07-20 RX ADMIN — ONDANSETRON 4 MG: 2 INJECTION INTRAMUSCULAR; INTRAVENOUS at 09:02

## 2021-07-20 RX ADMIN — METHOCARBAMOL 1000 MG: 500 TABLET ORAL at 19:07

## 2021-07-20 RX ADMIN — MORPHINE SULFATE 4 MG: 4 INJECTION, SOLUTION INTRAMUSCULAR; INTRAVENOUS at 09:02

## 2021-07-20 RX ADMIN — LOSARTAN POTASSIUM 25 MG: 25 TABLET, FILM COATED ORAL at 20:58

## 2021-07-20 RX ADMIN — PREGABALIN 150 MG: 75 CAPSULE ORAL at 20:58

## 2021-07-20 RX ADMIN — DICYCLOMINE HYDROCHLORIDE 20 MG: 20 TABLET ORAL at 15:47

## 2021-07-20 RX ADMIN — SODIUM CHLORIDE 1000 ML: 9 INJECTION, SOLUTION INTRAVENOUS at 09:02

## 2021-07-20 RX ADMIN — DICYCLOMINE HYDROCHLORIDE 40 MG: 20 TABLET ORAL at 20:58

## 2021-07-20 RX ADMIN — CLONIDINE HYDROCHLORIDE 0.2 MG: 0.1 TABLET ORAL at 20:57

## 2021-07-20 RX ADMIN — MORPHINE SULFATE 4 MG: 4 INJECTION, SOLUTION INTRAMUSCULAR; INTRAVENOUS at 12:04

## 2021-07-20 RX ADMIN — OXYCODONE AND ACETAMINOPHEN 1 TABLET: 7.5; 325 TABLET ORAL at 17:17

## 2021-07-20 RX ADMIN — POTASSIUM CHLORIDE, DEXTROSE MONOHYDRATE AND SODIUM CHLORIDE: 150; 5; 450 INJECTION, SOLUTION INTRAVENOUS at 15:47

## 2021-07-20 RX ADMIN — ACETAMINOPHEN 650 MG: 325 TABLET ORAL at 20:07

## 2021-07-20 RX ADMIN — OXYCODONE AND ACETAMINOPHEN 1 TABLET: 7.5; 325 TABLET ORAL at 23:54

## 2021-07-20 RX ADMIN — FAMOTIDINE 40 MG: 20 TABLET ORAL at 20:58

## 2021-07-20 RX ADMIN — RIVAROXABAN 20 MG: 20 TABLET, FILM COATED ORAL at 20:57

## 2021-07-20 ASSESSMENT — PAIN SCALES - GENERAL
PAINLEVEL_OUTOF10: 10
PAINLEVEL_OUTOF10: 7
PAINLEVEL_OUTOF10: 9
PAINLEVEL_OUTOF10: 7
PAINLEVEL_OUTOF10: 9
PAINLEVEL_OUTOF10: 4
PAINLEVEL_OUTOF10: 8
PAINLEVEL_OUTOF10: 9
PAINLEVEL_OUTOF10: 10
PAINLEVEL_OUTOF10: 2

## 2021-07-20 ASSESSMENT — PAIN DESCRIPTION - LOCATION
LOCATION: HIP;BACK
LOCATION: ABDOMEN

## 2021-07-20 ASSESSMENT — PAIN DESCRIPTION - DESCRIPTORS: DESCRIPTORS: CONSTANT

## 2021-07-20 ASSESSMENT — PAIN DESCRIPTION - PAIN TYPE
TYPE: ACUTE PAIN
TYPE: ACUTE PAIN;CHRONIC PAIN
TYPE: ACUTE PAIN

## 2021-07-20 NOTE — PROGRESS NOTES
4 Eyes Skin Assessment     The patient is being assess for   Admission    I agree that 2 RN's have performed a thorough Head to Toe Skin Assessment on the patient. ALL assessment sites listed below have been assessed. Areas assessed for pressure by both nurses:   [x]   Head, Face, and Ears   [x]   Shoulders, Back, and Chest, Abdomen  [x]   Arms, Elbows, and Hands   [x]   Coccyx, Sacrum, and Ischium  [x]   Legs, Feet, and Heels        Skin Assessed Under all Medical Devices by both nurses:  n/a              All Mepilex Borders were peeled back and area peeked at by both nurses:  Yes  Please list where Mepilex Borders are located:  none               Co-signer eSignature: Electronically signed by Jd Vences RN on 7/20/21 at 5:15 PM EDT    Does the Patient have Skin Breakdown related to pressure?   No              Osmani Prevention initiated:  No   Wound Care Orders initiated:  No      WOC nurse consulted for Pressure Injury (Stage 3,4, Unstageable, DTI, NWPT, Complex wounds)and New or Established Ostomies:  NA      Primary Nurse eSignature: Electronically signed by Mario Arnett RN on 7/20/21 at 2:38 PM EDT

## 2021-07-20 NOTE — PROGRESS NOTES
07/20/21 1607   RT Protocol   Smoking Status 0   Surgical status 0   Xray 0   Respiratory pattern 0   Mental Status 0   Breath sounds 0   Cough 0   Activity level 0   Oxygen Requirement 0   Indications for Bronchodilator Therapy None   Bronchodilator Assessment Score 0   Indications for Bronchial Hygiene None   Bronchial Hygiene Assessment Score 0   Indications for Volume Expansion None   Volume Expansion Assessment Score 0

## 2021-07-20 NOTE — ED TRIAGE NOTES
Patient identified as a positive fall risk on the ED triage fall screening. Patient placed in fall precautions which includes:  yellow fall risk bracelet on wrist, non skid shoes on feet, \"Be Safe\" sign placed on patient's door. Patient instructed on importance of not getting out of bed or ambulating without assistance for safety.

## 2021-07-20 NOTE — RT PROTOCOL NOTE

## 2021-07-20 NOTE — CONSULTS
[id number] [facility],  [address] [place] [address], [facility]  Phone: [address] She is a  [4] White [1] 48 y.o. Cledeng Crespo female      Main Problems/Chief Complaint for which GI service is seeing pt     Persistent diarrhea    Clinical Summary    The patient has had persistent diarrhea for years. She has tried multiple modalities to control it. At one point she also had C. difficile toxin present in the stool and has had fecal transplant. She was seeing [facility] before. But I have been informed that at this time the patient does not intend to follow-up with [facility] and wants [facility] to provide care for the GI aspect of her illness. The extensive work-up performed before and in EPIC is reviewed. No enteric pathogen was found. Recent C. difficile was negative. Fecal leukocyte was also absent.     PAST MEDICAL HISTORY     Past Medical History:   Diagnosis Date    Abdominal pain, other specified site 4/18/2013    Anemia     Anxiety     Chronic GERD 11/17/2016    Chronic nausea     Clostridium difficile diarrhea 4/5/17, 2013    4 positive tests in 2013    Depression     Fibromyalgia     GERD (gastroesophageal reflux disease)     Morbid obesity with BMI of 40.0-44.9, adult ([facility] [address]     Neuroma digital nerve 12/16/2016    PONV (postoperative nausea and vomiting)     Primary osteoarthritis of right knee 2/10/2016    Sleep apnea     Thyroid disease     hypothyroidism     FAMILY HISTORY     Family History   Problem Relation Age of Onset    High Blood Pressure Mother     Elevated Lipids Mother     Diabetes Mother     Other Mother         Melanoma    Depression Mother     Anxiety Disorder Mother     Diabetes Father     Heart Disease Father     Anxiety Disorder Father     Depression Father     Other Father         PTSD    Cancer Father         Throat        SURGICAL HISTORY     Past Surgical History:   Procedure Laterality Date    AND RECOMMENDATIONS     Additional work-up is ordered- stool sodium &osmolality, ova and parasite, and some other tests. IgA TTG should be done after 3 weeks of high gluten diet to detect evidence of celiac disease. Patient was questioning why celiac disease is being considered by me and did not appear to be in agreement with pursuing celiac disease diagnosis. The total time spent face-to-face, review of the record and on the terry during this visit was 30 minutes with more than 50% of the time spent in review of the electronic record to find extensive work-up for diarrhea which is chronic and counseling pt for lactose-free diet trial and need to exclude celiac disease. Dena Atwood MD 7/20/21 2:53 PM EDT    CC:  Yann Mathis MD      IMPORTANT: Please note that some portions of this note may have been created using Dragon voice recognition software. Some \"sound-alike\" and totally wrong word substitutions may have taken place due to known inherent limitations of any such software, including this voice recognition software. In spite of efforts to eliminate such errors, some may not have been corrected. So please read the note with this in mind and recognize such mistakes and understand the correct version using the  context. Thanks.

## 2021-07-20 NOTE — ED PROVIDER NOTES
[facility] Emergency Department      CHIEF COMPLAINT  Diarrhea (patient returning to the ED today stating \"something is wrong\" pt was seen in the ED on 7/18 and had a complete abdominal work up. negative c-diff. dx with Enteritis. pt reports she's called GI but reports \"theyve never called me back\" pt co continued diarrhea stating, \"I dont' know why they can't just admit me\" )      [address] [facility]  Adrianne Rizo is a 48 y.o. female with a history of IBS, fibromyalgia and remote history of recurrent C. difficile s/p fecal transplant 2013 presents with intractable abdominal pain and diarrhea. She states that she has been having yellow-colored diarrhea that has been nonbloody, nonblack in appearance since last week. She was seen in the ER this past Sunday, 2 days ago and had lab work and a CT scan and was told she had enteritis. She was referred to follow-up with [facility] and the ER provider spoke with Dr. Darline Awan. She states she tried to call to make an appointment but they have not yet called her back. She states she continues to have close to 5 episodes of diarrhea every hour and is having a lot of pain. She has not had fevers. She has been nauseated but no vomiting. She is concerned that she has become dehydrated. She states she cannot tolerate this pain anymore. No other complaints, modifying factors or associated symptoms. I have reviewed the following from the nursing documentation.     Past Medical History:   Diagnosis Date    Abdominal pain, other specified site 4/18/2013    Anemia     Anxiety     Chronic GERD 11/17/2016    Chronic nausea     Clostridium difficile diarrhea 4/5/17, 2013    4 positive tests in 2013    Depression     Fibromyalgia     GERD (gastroesophageal reflux disease)     Morbid obesity with BMI of 40.0-44.9, adult ([facility])     Neuroma digital nerve 12/16/2016    PONV (postoperative nausea and vomiting)     Primary osteoarthritis of right knee 2/10/2016    Sleep apnea     Thyroid disease     hypothyroidism     Past Surgical History:   Procedure Laterality Date    BUNIONECTOMY Right      SECTION      x 2    CHOLECYSTECTOMY      COLONOSCOPY  13    NOT COMPLETE    CYSTOSCOPY      DILATION AND CURETTAGE OF UTERUS      FOOT SURGERY      neuroma and lesion excision    FOOT SURGERY Right 2016    HEMORRHOID SURGERY      HERNIA REPAIR      bilateral inguinal X3    HYSTERECTOMY      LAPAROSCOPY      OTHER SURGICAL HISTORY      Fecal transplant    OTHER SURGICAL HISTORY  10/10/2018    cystoscopy, urethral dilatation    SD CYSTOSCOPY,DIL URETHRAL STRICTURE N/A 10/10/2018    CYSTOSCOPY, URETHRAL DILATATION performed by Yaya Finn MD at [address]  2017     Family History   Problem Relation Age of Onset    High Blood Pressure Mother     Elevated Lipids Mother     Diabetes Mother     Other Mother         Melanoma    Depression Mother     Anxiety Disorder Mother     Diabetes Father     Heart Disease Father     Anxiety Disorder Father     Depression Father     Other Father         PTSD    Cancer Father         Throat      Social History     Socioeconomic History    Marital status:      Spouse name: Not on file    Number of children: Not on file    Years of education: Not on file    Highest education level: Not on file   Occupational History    Not on file   Tobacco Use    Smoking status: Former Smoker     Packs/day: 1.00     Years: 28.00     Pack years: 28.00     Types: Cigarettes     Quit date: 2017     Years since quitting: 3.6    Smokeless tobacco: Never Used   Vaping Use    Vaping Use: Never used   Substance and Sexual Activity    Alcohol use: No    Drug use: No    Sexual activity: Never   Other Topics Concern    Not on file   Social History Narrative    ** Merged History Encounter **          Social Determinants of Health Financial Resource Strain:     Difficulty of Paying Living Expenses:    Food Insecurity:     Worried About Running Out of Food in the Last Year:     [address] in the Last Year:    Transportation Needs:     Lack of Transportation (Medical):  Lack of Transportation (Non-Medical):    Physical Activity:     Days of Exercise per Week:     Minutes of Exercise per Session:    Stress:     Feeling of Stress :    Social Connections:     Frequency of Communication with Friends and Family:     Frequency of Social Gatherings with Friends and Family:     Attends Sabianist Services:     Active Member of Clubs or Organizations:     Attends Club or Organization Meetings:     Marital Status:    Intimate Partner Violence:     Fear of Current or Ex-Partner:     Emotionally Abused:     Physically Abused:     Sexually Abused:      No current facility-administered medications for this encounter. Current Outpatient Medications   Medication Sig Dispense Refill    famotidine (PEPCID) 40 MG tablet Take 40 mg by mouth nightly as needed      losartan (COZAAR) 25 MG tablet Take 25 mg by mouth daily      methocarbamol (ROBAXIN) 1000 MG/10ML injection Infuse 1,000 mg intravenously once      adalimumab (HUMIRA) 40 MG/0.8ML injection Inject 20 mg into the skin every 14 days      cloNIDine (CATAPRES) 0.2 MG tablet TK 1 T PO  QD  0    desvenlafaxine succinate (PRISTIQ) 50 MG TB24 extended release tablet   0    vitamin D (ERGOCALCIFEROL) 55420 units CAPS capsule   0    promethazine (PHENERGAN) 25 MG tablet TK 1 T PO Q 6 H PRN  2    oxyCODONE-acetaminophen (PERCOCET) 7.5-325 MG per tablet Take 1 tablet by mouth 4 times daily as needed. 0    lidocaine (LIDODERM) 5 % Place 1 patch onto the skin daily 12 hours on, 12 hours off. 30 patch 0    cetirizine (ZYRTEC) 10 MG tablet TAKE 1 TABLET BY MOUTH DAILY 90 tablet 0    LYRICA 150 MG capsule Take 1 capsule by mouth 2 times daily. .  2    busPIRone (BUSPAR) 30 MG tablet Take 15 mg by mouth 3 times daily       VENTOLIN  (90 Base) MCG/ACT inhaler INHALE 2 PUFFS BY MOUTH INTO THE LUNGS EVERY 6 HOURS AS NEEDED FOR WHEEZING OR SHORTNESS OF BREATH 1 Inhaler 2    XARELTO 20 MG TABS tablet TAKE 1 TABLET BY MOUTH DAILY WITH BREAKFAST 30 tablet 5    ondansetron (ZOFRAN ODT) 4 MG disintegrating tablet Take 1 tablet by mouth every 8 hours as needed for Nausea or Vomiting 15 tablet 0    dicyclomine (BENTYL) 20 MG tablet TAKE 2 TABLETS BY MOUTH THREE TIMES DAILY (Patient taking differently: take 2 tablets four times daily) 180 tablet 3    calcium carbonate (TUMS) 500 MG chewable tablet Take 2 tablets by mouth as needed Indications: for reflux       scopolamine (TRANSDERM-SCOP) transdermal patch Place 1 patch onto the skin every 72 hours (Patient taking differently: Place 1 patch onto the skin every 72 hours as needed ) 4 patch 3    diphenhydrAMINE (BENADRYL) 50 MG capsule Take 50 mg by mouth nightly as needed for Allergies. Allergies   Allergen Reactions    Latex      duplicate    Clindamycin Rash     c-diff    Serzone [Nefazodone] Itching    Butrans [Buprenorphine] Hives, Diarrhea and Rash    Adhesive Tape     Aripiprazole      Too much vertigo     Avelox [Moxifloxacin Hcl In Nacl]     Flagyl [Metronidazole]      Worsened c diff    Indocin [Indometacin Sodium]     Indomethacin Hives    Latuda [Lurasidone Hcl]      Personality changes.  Moxifloxacin      Gave c dif    Neurontin [Gabapentin] Other (See Comments)     \"felt psychotic\"  Mood changes    Pseudephedrine Plus [Chlorpheniramine-Pseudoeph]     Sudafed [Pseudoephedrine Hcl]     Sulfa Antibiotics     Ultram [Tramadol]      Can take percocet , morphine     Influenza Vaccines     Vancomycin Rash       REVIEW OF SYSTEMS  10 systems reviewed, pertinent positives per HPI otherwise noted to be negative.     PHYSICAL EXAM  /81   Pulse 88   Temp 98 °F (36.7 °C) (Oral)   Resp 16   Ht 5' 5\" (1.651 m)   Wt 270 lb (122.5 kg)   LMP 07/31/2011   SpO2 95%   BMI 44.93 kg/m²   GENERAL APPEARANCE: Awake and alert. Cooperative. No acute distress. HEAD: Normocephalic. Atraumatic. EYES: PERRL. EOM's grossly intact. ENT: Mucous membranes are moist.   NECK: Supple, trachea midline. HEART: RRR. LUNGS: Respirations unlabored. CTAB. Good air exchange. No wheezes, rales, or rhonchi. Speaking comfortably in full sentences. ABDOMEN: Soft. Non-distended. Diffuse tenderness, no rebound or guarding. EXTREMITIES: No peripheral edema. MAEE. No acute deformities. SKIN: Warm, dry and intact. No acute rashes. NEUROLOGICAL: Alert and oriented X 3. CN II-XII grossly intact. Strength 5/5, sensation intact. Normal coordination. PSYCHIATRIC: Normal mood and affect. LABS  I have reviewed all labs for this visit.    Results for orders placed or performed during the hospital encounter of 07/20/21   CBC auto differential   Result Value Ref Range    WBC 11.0 4.0 - 11.0 K/uL    RBC 4.71 4.00 - 5.20 M/uL    Hemoglobin 14.1 12.0 - 16.0 g/dL    Hematocrit 40.2 36.0 - 48.0 %    MCV 85.4 80.0 - 100.0 fL    MCH 29.9 26.0 - 34.0 pg    MCHC 35.0 31.0 - 36.0 g/dL    RDW 15.7 (H) 12.4 - 15.4 %    Platelets 060 024 - 088 K/uL    MPV 8.2 5.0 - 10.5 fL    Neutrophils % 70.2 %    Lymphocytes % 21.6 %    Monocytes % 5.6 %    Eosinophils % 1.5 %    Basophils % 1.1 %    Neutrophils Absolute 7.7 1.7 - 7.7 K/uL    Lymphocytes Absolute 2.4 1.0 - 5.1 K/uL    Monocytes Absolute 0.6 0.0 - 1.3 K/uL    Eosinophils Absolute 0.2 0.0 - 0.6 K/uL    Basophils Absolute 0.1 0.0 - 0.2 K/uL   Comprehensive metabolic panel   Result Value Ref Range    Sodium 136 136 - 145 mmol/L    Potassium 3.8 3.5 - 5.1 mmol/L    Chloride 103 99 - 110 mmol/L    CO2 21 21 - 32 mmol/L    Anion Gap 12 3 - 16    Glucose 119 (H) 70 - 99 mg/dL    BUN 10 7 - 20 mg/dL    CREATININE 0.7 0.6 - 1.1 mg/dL    GFR Non-African American >60 >60    GFR  >60 >60 Calcium 9.5 8.3 - 10.6 mg/dL    Total Protein 7.2 6.4 - 8.2 g/dL    Albumin 4.1 3.4 - 5.0 g/dL    Albumin/Globulin Ratio 1.3 1.1 - 2.2    Total Bilirubin 0.4 0.0 - 1.0 mg/dL    Alkaline Phosphatase 92 40 - 129 U/L    ALT 17 10 - 40 U/L    AST 10 (L) 15 - 37 U/L    Globulin 3.1 g/dL   Lipase   Result Value Ref Range    Lipase 27.0 13.0 - 60.0 U/L   Urinalysis, reflex to microscopic   Result Value Ref Range    Color, UA Yellow Straw/Yellow    Clarity, UA Clear Clear    Glucose, Ur Negative Negative mg/dL    Bilirubin Urine Negative Negative    Ketones, Urine Negative Negative mg/dL    Specific Gravity, UA 1.020 1.005 - 1.030    Blood, Urine TRACE-INTACT (A) Negative    pH, UA 5.5 5.0 - 8.0    Protein, UA Negative Negative mg/dL    Urobilinogen, Urine 0.2 <2.0 E.U./dL    Nitrite, Urine Negative Negative    Leukocyte Esterase, Urine Negative Negative    Microscopic Examination YES     Urine Type NotGiven    Lactate, Sepsis   Result Value Ref Range    Lactic Acid, Sepsis 2.1 (H) 0.4 - 1.9 mmol/L   Microscopic Urinalysis   Result Value Ref Range    Hyaline Casts, UA 0-2 0 - 2 /LPF    WBC, UA 0-2 0 - 5 /HPF    RBC, UA 0-2 0 - 4 /HPF    Epithelial Cells, UA 2-5 0 - 5 /HPF    Bacteria, UA 1+ (A) None Seen /HPF               RADIOLOGY  X-RAYS:  I have reviewed radiologic plain film image(s). ALL OTHER NON-PLAIN FILM IMAGES SUCH AS CT, ULTRASOUND AND MRI HAVE BEEN READ BY THE RADIOLOGIST. No orders to display              Rechecks: Physical assessment performed. After 1 dose of morphine her pain is improved but she is requesting a second dose. She has been updated on her lab work. ED COURSE/MDM  Patient seen and evaluated. Here the patient is afebrile with normal vitals signs. Old records reviewed. Labs and imaging reviewed and results discussed with patient. Here the patient is nontoxic-appearing but she does appear uncomfortable. Her abdomen is tender everywhere I touch.   I reviewed her CT from 2 days ago that showed enteritis but no other acute findings. She had lab work including full stool studies including stool culture and C. difficile which were unremarkable. Lab work today shows a slightly elevated lactic acid of 2.1 but otherwise her labs are unremarkable, urinalysis is negative. She has been given IV fluids here, pain and nausea medicine. She has requested 2 doses of IV pain medicine due to intractable pain. She is very hesitant to go home because she states she will just be right back because her diarrhea continues. She states she has had diarrhea while she has been here as well. She is hesitant to take antibiotics as well due to her history of C. difficile. I am awaiting a callback from GI and I have placed a consult. I have also discussed the patient with Dr. Ella Niño, hospitalist for admission. He agrees to admit. Patient was reassessed as noted above . Plan of care discussed with patient. Patient in agreement with plan. CLINICAL IMPRESSION  1. Diarrhea, unspecified type    2. Intractable abdominal pain        Blood pressure 122/81, pulse 88, temperature 98 °F (36.7 °C), temperature source Oral, resp. rate 16, height 5' 5\" (1.651 m), weight 270 lb (122.5 kg), last menstrual period 07/31/2011, SpO2 95 %, not currently breastfeeding. Lisa Avendaño was admitted in stable condition.     (Please note this note was completed with a voice recognition program.  Efforts were made to edit the dictations but occasionally words are mis-transcribed.)       Babita Boudreaux MD  07/20/21 2859

## 2021-07-21 ENCOUNTER — TELEPHONE (OUTPATIENT)
Dept: SURGERY | Age: 46
End: 2021-07-21

## 2021-07-21 VITALS
HEIGHT: 65 IN | SYSTOLIC BLOOD PRESSURE: 134 MMHG | BODY MASS INDEX: 44.2 KG/M2 | OXYGEN SATURATION: 97 % | HEART RATE: 73 BPM | TEMPERATURE: 97.9 F | RESPIRATION RATE: 18 BRPM | WEIGHT: 265.31 LBS | DIASTOLIC BLOOD PRESSURE: 74 MMHG

## 2021-07-21 LAB
ALBUMIN SERPL-MCNC: 3.9 G/DL (ref 3.4–5)
ANION GAP SERPL CALCULATED.3IONS-SCNC: 9 MMOL/L (ref 3–16)
BUN BLDV-MCNC: 6 MG/DL (ref 7–20)
CALCIUM SERPL-MCNC: 9.3 MG/DL (ref 8.3–10.6)
CHLORIDE BLD-SCNC: 106 MMOL/L (ref 99–110)
CO2: 24 MMOL/L (ref 21–32)
CREAT SERPL-MCNC: 0.6 MG/DL (ref 0.6–1.1)
CRYPTOSPORIDIUM ANTIGEN STOOL: NORMAL
E HISTOLYTICA ANTIGEN STOOL: NORMAL
GFR AFRICAN AMERICAN: >60
GFR NON-AFRICAN AMERICAN: >60
GIARDIA ANTIGEN STOOL: NORMAL
GLUCOSE BLD-MCNC: 96 MG/DL (ref 70–99)
HCT VFR BLD CALC: 38.4 % (ref 36–48)
HEMOGLOBIN: 13.3 G/DL (ref 12–16)
MAGNESIUM: 1.8 MG/DL (ref 1.8–2.4)
MCH RBC QN AUTO: 29.5 PG (ref 26–34)
MCHC RBC AUTO-ENTMCNC: 34.6 G/DL (ref 31–36)
MCV RBC AUTO: 85.4 FL (ref 80–100)
PDW BLD-RTO: 15.5 % (ref 12.4–15.4)
PHOSPHORUS: 2.6 MG/DL (ref 2.5–4.9)
PLATELET # BLD: 323 K/UL (ref 135–450)
PMV BLD AUTO: 8.2 FL (ref 5–10.5)
POTASSIUM SERPL-SCNC: 4.5 MMOL/L (ref 3.5–5.1)
RBC # BLD: 4.49 M/UL (ref 4–5.2)
SODIUM BLD-SCNC: 139 MMOL/L (ref 136–145)
WBC # BLD: 7.7 K/UL (ref 4–11)

## 2021-07-21 PROCEDURE — 80069 RENAL FUNCTION PANEL: CPT

## 2021-07-21 PROCEDURE — 36415 COLL VENOUS BLD VENIPUNCTURE: CPT

## 2021-07-21 PROCEDURE — 2580000003 HC RX 258: Performed by: INTERNAL MEDICINE

## 2021-07-21 PROCEDURE — 83735 ASSAY OF MAGNESIUM: CPT

## 2021-07-21 PROCEDURE — G0378 HOSPITAL OBSERVATION PER HR: HCPCS

## 2021-07-21 PROCEDURE — 6370000000 HC RX 637 (ALT 250 FOR IP): Performed by: INTERNAL MEDICINE

## 2021-07-21 PROCEDURE — 85027 COMPLETE CBC AUTOMATED: CPT

## 2021-07-21 PROCEDURE — 2500000003 HC RX 250 WO HCPCS: Performed by: INTERNAL MEDICINE

## 2021-07-21 PROCEDURE — 99218 PR INITIAL OBSERVATION CARE/DAY 30 MINUTES: CPT | Performed by: INTERNAL MEDICINE

## 2021-07-21 RX ORDER — PANTOPRAZOLE SODIUM 40 MG/1
40 TABLET, DELAYED RELEASE ORAL
Status: DISCONTINUED | OUTPATIENT
Start: 2021-07-21 | End: 2021-07-21 | Stop reason: HOSPADM

## 2021-07-21 RX ADMIN — METHOCARBAMOL 1000 MG: 500 TABLET ORAL at 09:41

## 2021-07-21 RX ADMIN — LOSARTAN POTASSIUM 25 MG: 25 TABLET, FILM COATED ORAL at 09:52

## 2021-07-21 RX ADMIN — DESVENLAFAXINE 100 MG: 50 TABLET, EXTENDED RELEASE ORAL at 09:44

## 2021-07-21 RX ADMIN — FAMOTIDINE 40 MG: 20 TABLET ORAL at 09:48

## 2021-07-21 RX ADMIN — DICYCLOMINE HYDROCHLORIDE 40 MG: 20 TABLET ORAL at 09:41

## 2021-07-21 RX ADMIN — Medication 10 ML: at 09:43

## 2021-07-21 RX ADMIN — METHOCARBAMOL 1000 MG: 500 TABLET ORAL at 01:18

## 2021-07-21 RX ADMIN — ACETAMINOPHEN 650 MG: 325 TABLET ORAL at 09:41

## 2021-07-21 RX ADMIN — POTASSIUM CHLORIDE, DEXTROSE MONOHYDRATE AND SODIUM CHLORIDE: 150; 5; 450 INJECTION, SOLUTION INTRAVENOUS at 01:21

## 2021-07-21 RX ADMIN — BUSPIRONE HYDROCHLORIDE 30 MG: 5 TABLET ORAL at 09:42

## 2021-07-21 RX ADMIN — DICYCLOMINE HYDROCHLORIDE 40 MG: 20 TABLET ORAL at 13:10

## 2021-07-21 RX ADMIN — ONDANSETRON 4 MG: 4 TABLET, ORALLY DISINTEGRATING ORAL at 13:10

## 2021-07-21 RX ADMIN — CETIRIZINE HYDROCHLORIDE 10 MG: 10 TABLET, FILM COATED ORAL at 09:41

## 2021-07-21 RX ADMIN — PREGABALIN 150 MG: 75 CAPSULE ORAL at 09:42

## 2021-07-21 RX ADMIN — OXYCODONE AND ACETAMINOPHEN 1 TABLET: 7.5; 325 TABLET ORAL at 13:06

## 2021-07-21 RX ADMIN — CLONIDINE HYDROCHLORIDE 0.2 MG: 0.1 TABLET ORAL at 09:41

## 2021-07-21 RX ADMIN — OXYCODONE AND ACETAMINOPHEN 1 TABLET: 7.5; 325 TABLET ORAL at 06:05

## 2021-07-21 RX ADMIN — PANTOPRAZOLE SODIUM 40 MG: 40 TABLET, DELAYED RELEASE ORAL at 09:52

## 2021-07-21 ASSESSMENT — PAIN SCALES - GENERAL
PAINLEVEL_OUTOF10: 8
PAINLEVEL_OUTOF10: 9
PAINLEVEL_OUTOF10: 4
PAINLEVEL_OUTOF10: 8
PAINLEVEL_OUTOF10: 2

## 2021-07-21 ASSESSMENT — PAIN DESCRIPTION - PAIN TYPE: TYPE: ACUTE PAIN

## 2021-07-21 ASSESSMENT — PAIN DESCRIPTION - LOCATION: LOCATION: HIP;BACK;ABDOMEN

## 2021-07-21 NOTE — PROGRESS NOTES
Patient discharged. IV removed, telemetry box and leads removed and returned. Lockbox emptied. All belongings gathered and returned to patient. Discharge instructions reviewed with patient, all questions answered by RN. No further needs. Pt. Seen by Dr. Herb Huynh with [facility] and will follow up outpt. Encouraged pt. To use BRAT diet to increase bulk of stools.

## 2021-07-21 NOTE — PROGRESS NOTES
Per GI Dr. Dede Daniel, pt. May discharge home and follow up with GI outpt. For procedures. Dr. Yesi Saravai notified. Pt. Rosmery Polo with loose stools, c-diff was negative, awaiting results from other stool tests, lab states results will not result until tomorrow or later.

## 2021-07-21 NOTE — H&P
Hospital Medicine History & Physical      PCP: Thom Joe MD    Date of Admission: 2021    Date of Service: Pt seen/examined on  and Admitted to Inpatient with expected LOS greater than two midnights due to medical therapy. Chief Complaint:  Abdominal pain       History Of Present Illness:       48 y.o. female w/ hx presumed IBS and previous hx of C diff followed outpt by GI who presented to [facility] with a 2-3 day hx of persistent cramping abdominal pain w/ associated diarrhea as well as nausea w/out vomiting. The patient denies any fever/chills or other signs/sxs of systemic illness or identifiable aggravating/alleviating factors.       Past Medical History:          Diagnosis Date    Abdominal pain, other specified site 2013    Anemia     Anxiety     Chronic GERD 2016    Chronic nausea     Clostridium difficile diarrhea 2013    4 positive tests in     Depression     Fibromyalgia     GERD (gastroesophageal reflux disease)     Morbid obesity with BMI of 40.0-44.9, adult ([facility] [address]     Neuroma digital nerve 2016    PONV (postoperative nausea and vomiting)     Primary osteoarthritis of right knee 2/10/2016    Sleep apnea     Thyroid disease     hypothyroidism       Past Surgical History:          Procedure Laterality Date    BUNIONECTOMY Right      SECTION      x 2    CHOLECYSTECTOMY      COLONOSCOPY  13    NOT COMPLETE    CYSTOSCOPY      DILATION AND CURETTAGE OF UTERUS      FOOT SURGERY      neuroma and lesion excision    FOOT SURGERY Right 2016    HEMORRHOID SURGERY      HERNIA REPAIR      bilateral inguinal X3    HYSTERECTOMY      LAPAROSCOPY      OTHER SURGICAL HISTORY      Fecal transplant    OTHER SURGICAL HISTORY  10/10/2018    cystoscopy, urethral dilatation    [place] CYSTOSCOPY,DIL URETHRAL STRICTURE N/A 10/10/2018    CYSTOSCOPY, URETHRAL DILATATION performed by Rosa Vigil MD at [address] [place] [address] UPPER GASTROINTESTINAL ENDOSCOPY  05/12/2017       Medications Prior to Admission:      Prior to Admission medications    Medication Sig Start Date End Date Taking? Authorizing Provider   famotidine (PEPCID) 40 MG tablet Take 40 mg by mouth nightly as needed   Yes Historical Provider, MD   losartan (COZAAR) 25 MG tablet Take 25 mg by mouth daily   Yes Historical Provider, MD   methocarbamol (ROBAXIN) 1000 MG/10ML injection Infuse 1,000 mg intravenously every 6 hours Takes it po as needed   Yes Historical Provider, MD   adalimumab (HUMIRA) 40 MG/0.8ML injection Inject 20 mg into the skin every 14 days   Yes Historical Provider, MD   cloNIDine (CATAPRES) 0.2 MG tablet 0.2 mg 2 times daily  10/7/19  Yes Historical Provider, MD   desvenlafaxine succinate (PRISTIQ) 50 MG TB24 extended release tablet 100 mg daily  6/18/19  Yes Historical Provider, MD   vitamin D (ERGOCALCIFEROL) 01983 units CAPS capsule  6/30/19  Yes Historical Provider, MD   promethazine (PHENERGAN) 25 MG tablet TK 1 T PO Q 6 H PRN 6/6/19  Yes Historical Provider, MD   oxyCODONE-acetaminophen (PERCOCET) 7.5-325 MG per tablet Take 1 tablet by mouth 4 times daily as needed. 5/24/19  Yes Historical Provider, MD   cetirizine (ZYRTEC) 10 MG tablet TAKE 1 TABLET BY MOUTH DAILY 2/22/19  Yes Feli Ramirez DO   LYRICA 150 MG capsule Take 1 capsule by mouth 2 times daily. Pt.  Takes 200mg 2 times daily 1/31/19  Yes Historical Provider, MD   busPIRone (BUSPAR) 30 MG tablet Take 30 mg by mouth 2 times daily    Yes Historical Provider, MD   VENTOLIN  (90 Base) MCG/ACT inhaler INHALE 2 PUFFS BY MOUTH INTO THE LUNGS EVERY 6 HOURS AS NEEDED FOR WHEEZING OR SHORTNESS OF BREATH 1/2/19  Yes SILVIA Aceves - CNP   XARELTO 20 MG TABS tablet TAKE 1 TABLET BY MOUTH DAILY WITH BREAKFAST 10/8/18  Yes Feli Ramirez DO   ondansetron (ZOFRAN ODT) 4 MG disintegrating tablet Take 1 tablet by mouth every 8 hours as needed for Nausea or Vomiting 4/5/17  Yes negative. PHYSICAL EXAM:    /74   Pulse 73   Temp 97.9 °F (36.6 °C) (Oral)   Resp 18   Ht 5' 5\" (1.651 m)   Wt 265 lb 5 oz (120.3 kg)   LMP 07/31/2011   SpO2 97%   BMI 44.15 kg/m²     General appearance:  No apparent distress, appears stated age and cooperative. HEENT:  Normal cephalic, atraumatic without obvious deformity. Pupils equal, round, and reactive to light. Extra ocular muscles intact. Conjunctivae/corneas clear. Neck: Supple, with full range of motion. No jugular venous distention. Trachea midline. Respiratory:  Normal respiratory effort. Clear to auscultation, bilaterally without Rales/Wheezes/Rhonchi. Cardiovascular:  Regular rate and rhythm with normal S1/S2 without murmurs, rubs or gallops. Abdomen: Soft, non-tender, non-distended with normal bowel sounds. Musculoskeletal:  No clubbing, cyanosis or edema bilaterally. Full range of motion without deformity. Skin: Skin color, texture, turgor normal.  No rashes or lesions. Neurologic:  Neurovascularly intact without any focal sensory/motor deficits. Cranial nerves: II-XII intact, grossly non-focal.  Psychiatric:  Alert and oriented, thought content appropriate, normal insight  Capillary Refill: Brisk,< 3 seconds   Peripheral Pulses: +2 palpable, equal bilaterally       CXR:  I have reviewed the CXR with the following interpretation: N/A  EKG:  I have reviewed the EKG with the following interpretation: N/A    Labs:     Recent Labs     07/20/21  0830 07/21/21  0757   WBC 11.0 7.7   HGB 14.1 13.3   HCT 40.2 38.4    323     Recent Labs     07/20/21  0830 07/21/21  0757    139   K 3.8 4.5    106   CO2 21 24   BUN 10 6*   CREATININE 0.7 0.6   CALCIUM 9.5 9.3   PHOS  --  2.6     Recent Labs     07/20/21  0830   AST 10*   ALT 17   BILITOT 0.4   ALKPHOS 92     No results for input(s): INR in the last 72 hours. No results for input(s): Swapna Jerica in the last 72 hours.     Urinalysis:      Lab Results Component Value Date    NITRU Negative 07/20/2021    WBCUA 0-2 07/20/2021    BACTERIA 1+ 07/20/2021    RBCUA 0-2 07/20/2021    BLOODU TRACE-INTACT 07/20/2021    SPECGRAV 1.020 07/20/2021    GLUCOSEU Negative 07/20/2021    GLUCOSEU NEGATIVE 06/01/2010         ASSESSMENT:    Active Hospital Problems    Diagnosis Date Noted    Abdominal pain [R10.9] 07/20/2021       PLAN:      Abdominal Pain - of unclear etiology. GI consulted and appreciated w/ plan for close outpt f/u and continued w/up, possible outpt endoscopy. Morbid Obesity -  With Body mass index is 44.15 kg/m². Complicating assessment and treatment. Placing patient at risk for multiple co-morbidities as well as early death and contributing to the patient's presentation. Counseled on weight loss. DVT Prophylaxis: LMWH  Diet: ADULT DIET; Regular  Code Status: Full Code      PT/OT Eval Status: not ordered. Dispo - OK for discharge per GI if PO tolerated. Selina Mooney MD    Thank you Maxine Li MD for the opportunity to be involved in this patient's care. If you have any questions or concerns please feel free to contact me at [phone].

## 2021-07-21 NOTE — TELEPHONE ENCOUNTER
Patient admitted to ED 7/18 - 7/20; called to find out results of labs and next step. Patient stated that Dr. Andriy García mentioned an upper & lower scope was needed but wasn't sure if she needs to be seen in the office first or go directly to scheduling for scope.

## 2021-07-22 LAB
CALPROTECTIN, FECAL: 60 UG/G
FECAL NEUTRAL FAT: NORMAL
FECAL SPLIT FATS: NORMAL
OSMOLALITY STOOL: 331 MOSM/KG (ref 280–303)
SODIUM, FECAL: 106 MMOL/L

## 2021-07-22 NOTE — DISCHARGE SUMMARY
Hospital Medicine Discharge Summary    Patient ID: Love Francis      Patient's PCP: Jaspal Recinos MD    Admit Date: 7/20/2021     Discharge Date: 7/21/2021      Admitting Physician: Johnny Schwarz MD     Discharge Physician: Johnny Schwarz MD     Discharge Diagnoses: Active Hospital Problems    Diagnosis     Abdominal pain [R10.9]        The patient was seen and examined on day of discharge and this discharge summary is in conjunction with any daily progress note from day of discharge. Hospital Course:       Abdominal Pain - of unclear etiology. GI consulted and appreciated w/ plan for close outpt f/u and continued w/up, possible outpt endoscopy.      Morbid Obesity -  With Body mass index is 44.15 kg/m². Complicating assessment and treatment. Placing patient at risk for multiple co-morbidities as well as early death and contributing to the patient's presentation. Counseled on weight loss.        Labs: For convenience and continuity at follow-up the following most recent labs are provided:      CBC:    Lab Results   Component Value Date    WBC 7.7 07/21/2021    HGB 13.3 07/21/2021    HCT 38.4 07/21/2021     07/21/2021       Renal:    Lab Results   Component Value Date     07/21/2021    K 4.5 07/21/2021     07/21/2021    CO2 24 07/21/2021    BUN 6 07/21/2021    CREATININE 0.6 07/21/2021    CALCIUM 9.3 07/21/2021    PHOS 2.6 07/21/2021         Significant Diagnostic Studies    Radiology:   No orders to display          Consults:     IP CONSULT TO GI    Disposition: home     Condition at Discharge: Stable    Discharge Instructions/Follow-up:  w/ PCP 1-2 weeks and subspecialists as arranged.      Code Status:  Full Code    Activity: activity as tolerated    Diet: regular diet      Discharge Medications:     Discharge Medication List as of 7/21/2021  1:56 PM           Details   famotidine (PEPCID) 40 MG tablet Take 40 mg by mouth nightly as neededHistorical Med      losartan (COZAAR) 25 MG tablet Take 25 mg by mouth dailyHistorical Med      methocarbamol (ROBAXIN) 1000 MG/10ML injection Infuse 1,000 mg intravenously every 6 hours Takes it po as neededHistorical Med      adalimumab (HUMIRA) 40 MG/0.8ML injection Inject 20 mg into the skin every 14 daysHistorical Med      cloNIDine (CATAPRES) 0.2 MG tablet 0.2 mg 2 times daily , R-0Historical Med      desvenlafaxine succinate (PRISTIQ) 50 MG TB24 extended release tablet 100 mg daily , R-0Historical Med      vitamin D (ERGOCALCIFEROL) 32016 units CAPS capsule R-0Historical Med      promethazine (PHENERGAN) 25 MG tablet TK 1 T PO Q 6 H PRN, R-2Historical Med      oxyCODONE-acetaminophen (PERCOCET) 7.5-325 MG per tablet Take 1 tablet by mouth 4 times daily as needed., R-0Historical Med      lidocaine (LIDODERM) 5 % Place 1 patch onto the skin daily 12 hours on, 12 hours off., Disp-30 patch, R-0Print      cetirizine (ZYRTEC) 10 MG tablet TAKE 1 TABLET BY MOUTH DAILY, Disp-90 tablet, R-0Normal      LYRICA 150 MG capsule Take 1 capsule by mouth 2 times daily. Pt.  Takes 200mg 2 times daily, R-2, DAWHistorical Med      busPIRone (BUSPAR) 30 MG tablet Take 30 mg by mouth 2 times daily Historical Med      VENTOLIN  (90 Base) MCG/ACT inhaler INHALE 2 PUFFS BY MOUTH INTO THE LUNGS EVERY 6 HOURS AS NEEDED FOR WHEEZING OR SHORTNESS OF BREATH, Disp-1 Inhaler, R-2Normal      XARELTO 20 MG TABS tablet TAKE 1 TABLET BY MOUTH DAILY WITH BREAKFAST, Disp-30 tablet, R-5Normal      ondansetron (ZOFRAN ODT) 4 MG disintegrating tablet Take 1 tablet by mouth every 8 hours as needed for Nausea or Vomiting, Disp-15 tablet, R-0Print      dicyclomine (BENTYL) 20 MG tablet TAKE 2 TABLETS BY MOUTH THREE TIMES DAILY, Disp-180 tablet, R-3      calcium carbonate (TUMS) 500 MG chewable tablet Take 2 tablets by mouth as needed Indications: for reflux Historical Med      scopolamine (TRANSDERM-SCOP) transdermal patch Place 1 patch onto the skin every 72 hours, Disp-4 patch, R-3             Time Spent on discharge is more than 30 minutes in the examination, evaluation, counseling and review of medications and discharge plan. Signed:    Sarah Kennedy MD   7/22/2021      Thank you Lizbeth MD Barrett for the opportunity to be involved in this patient's care. If you have any questions or concerns please feel free to contact me at [phone].

## 2021-07-22 NOTE — TELEPHONE ENCOUNTER
Patient called and was upset that she does not know what her next step is because she is in pain.  I told her that we have to wait for the results to be completed and that the doctor will review them at that time and a letter will be generated and we will call prior to mailed his recommendations,

## 2021-07-23 NOTE — TELEPHONE ENCOUNTER
Patient wanted to know if they could do blood work to find out if she has Celiac disease. She would like a call back to discuss her next plan of care.

## 2021-08-02 ENCOUNTER — TELEPHONE (OUTPATIENT)
Dept: GASTROENTEROLOGY | Age: 46
End: 2021-08-02

## 2021-08-02 ENCOUNTER — OFFICE VISIT (OUTPATIENT)
Dept: GASTROENTEROLOGY | Age: 46
End: 2021-08-02
Payer: COMMERCIAL

## 2021-08-02 VITALS
BODY MASS INDEX: 43.57 KG/M2 | HEART RATE: 116 BPM | WEIGHT: 261.8 LBS | DIASTOLIC BLOOD PRESSURE: 62 MMHG | SYSTOLIC BLOOD PRESSURE: 109 MMHG

## 2021-08-02 DIAGNOSIS — R19.7 DIARRHEA, UNSPECIFIED TYPE: ICD-10-CM

## 2021-08-02 DIAGNOSIS — K21.9 GASTROESOPHAGEAL REFLUX DISEASE WITHOUT ESOPHAGITIS: Primary | ICD-10-CM

## 2021-08-02 DIAGNOSIS — R68.89 ABNORMAL WEIGHT: ICD-10-CM

## 2021-08-02 PROCEDURE — 99214 OFFICE O/P EST MOD 30 MIN: CPT | Performed by: INTERNAL MEDICINE

## 2021-08-02 PROCEDURE — G8417 CALC BMI ABV UP PARAM F/U: HCPCS | Performed by: INTERNAL MEDICINE

## 2021-08-02 PROCEDURE — 1036F TOBACCO NON-USER: CPT | Performed by: INTERNAL MEDICINE

## 2021-08-02 PROCEDURE — G8427 DOCREV CUR MEDS BY ELIG CLIN: HCPCS | Performed by: INTERNAL MEDICINE

## 2021-08-02 RX ORDER — EZETIMIBE 10 MG/1
1 TABLET ORAL DAILY
COMMUNITY
Start: 2021-07-01

## 2021-08-02 RX ORDER — DIPHENOXYLATE HYDROCHLORIDE AND ATROPINE SULFATE 2.5; .025 MG/1; MG/1
1 TABLET ORAL 2 TIMES DAILY
COMMUNITY
Start: 2021-07-28

## 2021-08-02 RX ORDER — MIRTAZAPINE 15 MG/1
15 TABLET, FILM COATED ORAL NIGHTLY
COMMUNITY

## 2021-08-02 NOTE — PROGRESS NOTES
Father     Depression Father     Other Father         PTSD    Cancer Father         Throat      SOCIAL HISTORY     Social History     Socioeconomic History    Marital status:      Spouse name: Not on file    Number of children: Not on file    Years of education: Not on file    Highest education level: Not on file   Occupational History    Not on file   Tobacco Use    Smoking status: Former Smoker     Packs/day: 1.00     Years: 28.00     Pack years: 28.00     Types: Cigarettes     Quit date: 2017     Years since quitting: 3.6    Smokeless tobacco: Never Used   Vaping Use    Vaping Use: Never used   Substance and Sexual Activity    Alcohol use: No    Drug use: No    Sexual activity: Never   Other Topics Concern    Not on file   Social History Narrative    ** Merged History Encounter **          Social Determinants of Health     Financial Resource Strain:     Difficulty of Paying Living Expenses:    Food Insecurity:     Worried About Running Out of Food in the Last Year:     Ran Out of Food in the Last Year:    Transportation Needs:     Lack of Transportation (Medical):      Lack of Transportation (Non-Medical):    Physical Activity:     Days of Exercise per Week:     Minutes of Exercise per Session:    Stress:     Feeling of Stress :    Social Connections:     Frequency of Communication with Friends and Family:     Frequency of Social Gatherings with Friends and Family:     Attends Worship Services:     Active Member of Clubs or Organizations:     Attends Club or Organization Meetings:     Marital Status:    Intimate Partner Violence:     Fear of Current or Ex-Partner:     Emotionally Abused:     Physically Abused:     Sexually Abused:      SURGICAL HISTORY     Past Surgical History:   Procedure Laterality Date    BUNIONECTOMY Right      SECTION      x 2    CHOLECYSTECTOMY      COLONOSCOPY  13    NOT COMPLETE    CYSTOSCOPY      DILATION AND CURETTAGE OF UTERUS      FOOT SURGERY      neuroma and lesion excision    FOOT SURGERY Right 01/30/2016    HEMORRHOID SURGERY      HERNIA REPAIR      bilateral inguinal X3    HYSTERECTOMY      LAPAROSCOPY      OTHER SURGICAL HISTORY      Fecal transplant    OTHER SURGICAL HISTORY  10/10/2018    cystoscopy, urethral dilatation    [place] CYSTOSCOPY,DIL URETHRAL STRICTURE N/A 10/10/2018    CYSTOSCOPY, URETHRAL DILATATION performed by Yesi Rivera MD at [address]  05/12/2017     CURRENT MEDICATIONS   (This list may include medications prescribed during this encounter as epic can not insert only the list prior to this encounter.)  Current Outpatient Rx   Medication Sig Dispense Refill    famotidine (PEPCID) 40 MG tablet Take 40 mg by mouth nightly as needed      losartan (COZAAR) 25 MG tablet Take 25 mg by mouth daily      methocarbamol (ROBAXIN) 1000 MG/10ML injection Infuse 1,000 mg intravenously every 6 hours Takes it po as needed      adalimumab (HUMIRA) 40 MG/0.8ML injection Inject 20 mg into the skin every 14 days      cloNIDine (CATAPRES) 0.2 MG tablet 0.2 mg 2 times daily   0    desvenlafaxine succinate (PRISTIQ) 50 MG TB24 extended release tablet 100 mg daily   0    vitamin D (ERGOCALCIFEROL) 98854 units CAPS capsule   0    promethazine (PHENERGAN) 25 MG tablet TK 1 T PO Q 6 H PRN  2    oxyCODONE-acetaminophen (PERCOCET) 7.5-325 MG per tablet Take 1 tablet by mouth 4 times daily as needed. 0    lidocaine (LIDODERM) 5 % Place 1 patch onto the skin daily 12 hours on, 12 hours off. 30 patch 0    cetirizine (ZYRTEC) 10 MG tablet TAKE 1 TABLET BY MOUTH DAILY 90 tablet 0    LYRICA 150 MG capsule Take 1 capsule by mouth 2 times daily. Pt.  Takes 200mg 2 times daily  2    busPIRone (BUSPAR) 30 MG tablet Take 30 mg by mouth 2 times daily       VENTOLIN  (90 Base) MCG/ACT inhaler INHALE 2 PUFFS BY MOUTH INTO THE LUNGS EVERY 6 HOURS AS NEEDED FOR WHEEZING OR SHORTNESS OF BREATH 1 Inhaler 2    XARELTO 20 MG TABS tablet TAKE 1 TABLET BY MOUTH DAILY WITH BREAKFAST 30 tablet 5    ondansetron (ZOFRAN ODT) 4 MG disintegrating tablet Take 1 tablet by mouth every 8 hours as needed for Nausea or Vomiting 15 tablet 0    dicyclomine (BENTYL) 20 MG tablet TAKE 2 TABLETS BY MOUTH THREE TIMES DAILY (Patient taking differently: take 2 tablets four times daily) 180 tablet 3    calcium carbonate (TUMS) 500 MG chewable tablet Take 2 tablets by mouth as needed Indications: for reflux       scopolamine (TRANSDERM-SCOP) transdermal patch Place 1 patch onto the skin every 72 hours (Patient taking differently: Place 1 patch onto the skin every 72 hours as needed ) 4 patch 3    ezetimibe (ZETIA) 10 MG tablet Take 1 tablet by mouth daily      diphenoxylate-atropine (LOMOTIL) 2.5-0.025 MG per tablet Take 1 tablet by mouth 2 times daily.  mirtazapine (REMERON) 15 MG tablet Take 7.5 mg by mouth nightly         ALLERGIES     Allergies   Allergen Reactions    Latex      duplicate    Clindamycin Rash     c-diff    Serzone [Nefazodone] Itching    Butrans [Buprenorphine] Hives, Diarrhea and Rash    Adhesive Tape     Aripiprazole      Too much vertigo     Avelox [Moxifloxacin Hcl In Nacl]     Flagyl [Metronidazole]      Worsened c diff    Indocin [Indometacin Sodium]     Indomethacin Hives    Latuda [Lurasidone Hcl]      Personality changes.     Moxifloxacin      Gave c dif    Neurontin [Gabapentin] Other (See Comments)     \"felt psychotic\"  Mood changes    Pseudephedrine Plus [Chlorpheniramine-Pseudoeph]     Sudafed [Pseudoephedrine Hcl]     Sulfa Antibiotics     Ultram [Tramadol]      Can take percocet , morphine     Influenza Vaccines     Vancomycin Rash       REVIEW OF SYSTEMS   No chest pain suspicious for cardiac origin  No SOB    PHYSICAL EXAM   VITAL SIGNS: /62 (Site: Left Wrist, Position: Sitting, Cuff Size: Medium Adult)   Pulse 116   Wt 261 lb 12.8 oz (118.8 kg)   [facility] 07/31/2011   BMI 43.57 kg/m²   Wt Readings from Last 1 Encounters:   08/02/21 261 lb 12.8 oz (118.8 kg)     Constitutional: Well developed, Well nourished, No acute distress, Non-toxic appearance. Heart: WNL  Lungs: Clear to A&P  Abd: soft, mild epigastric tenderness without rebound or rigidity, no masses are felt. Neurologic: Alert & oriented x 3. Psych: Good mood and memory. Pt is able to make appropriate decisions. FINAL IMPRESSION AND RECOMMENDATIONS     The patient is willing to go through the process of establishing or ruling out the diagnosis of celiac disease. Pt is counseled about GERD - management issues were explained to her and continued wt reduction is encouraged. EGD with Bx and possible polypectomy, dilation, cauterization and other interventions during the procedure as needed is recommended. The patient is explained the above procedure(s) in simple words along with its need, risks, benefits and alternatives. The risks explained to the patient included, but are not limited to, bleeding, perforation, infection, suppression of breathing, heart attack, stroke, need for hospitalization and/or surgery and remotely even death. All the risks put together account for  0.03% of cases. The patient has voiced the understanding of the above and has been given opportunity to ask questions. No question was left unanswered. The informed consent for the above procedure(s) is obtained. The total time spent face-to-face during this visit and before and after the visit was 30 minutes with more than 50% of the time spent in reviewing the electronic chart which showed that she has had C.  Diff in the past needing fecal transplant, EGD and colonoscopy, coordination of care for getting celiac disease w/u and setting up an  EGD and counseling the patient about importance of establishing the diagnosis of celiac disease and about EGD and about pt deciding at one point to see if she is a surgical candiate for plication in stead of just relying on PPI which have side effects which were explained to the patient. Keaton Ulrich MD 8/2/21 12:57 PM EDT    CC:  Amrita Busby MD      IMPORTANT: Please note that some portions of this note may have been created using Dragon voice recognition software. Some \"sound-alike\" and totally wrong word substitutions may have taken place due to known inherent limitations of any such software, including this voice recognition software. In spite of efforts to eliminate such errors, some may not have been corrected. So please read the note with this in mind and recognize such mistakes and understand the correct version using the  context. Thanks.

## 2021-08-02 NOTE — TELEPHONE ENCOUNTER
Provider:Dr. Augustine Schmitt  Has the patient been vaccinated against COVID?  No  Procedure:EGD  Sedation:MAC  Type of prep:NPO  Location:[place]   Prep instructions provided to patient during office visit

## 2021-08-06 DIAGNOSIS — Z01.812 PRE-PROCEDURE LAB EXAM: Primary | ICD-10-CM

## 2021-08-24 RX ORDER — METHOCARBAMOL 500 MG/1
1000 TABLET, FILM COATED ORAL 3 TIMES DAILY PRN
COMMUNITY

## 2021-08-24 RX ORDER — PANTOPRAZOLE SODIUM 40 MG/1
40 TABLET, DELAYED RELEASE ORAL DAILY
COMMUNITY

## 2021-08-25 ENCOUNTER — HOSPITAL ENCOUNTER (OUTPATIENT)
Age: 46
Discharge: HOME | End: 2021-08-25
Payer: COMMERCIAL

## 2021-08-25 DIAGNOSIS — Z01.812 PRE-PROCEDURE LAB EXAM: ICD-10-CM

## 2021-08-25 LAB — SARS-COV-2: NOT DETECTED

## 2021-08-25 PROCEDURE — U0003 INFECTIOUS AGENT DETECTION BY NUCLEIC ACID (DNA OR RNA); SEVERE ACUTE RESPIRATORY SYNDROME CORONAVIRUS 2 (SARS-COV-2) (CORONAVIRUS DISEASE [COVID-19]), AMPLIFIED PROBE TECHNIQUE, MAKING USE OF HIGH THROUGHPUT TECHNOLOGIES AS DESCRIBED BY CMS-2020-01-R: HCPCS

## 2021-08-25 PROCEDURE — U0005 INFEC AGEN DETEC AMPLI PROBE: HCPCS

## 2021-08-27 ENCOUNTER — ANESTHESIA EVENT (OUTPATIENT)
Dept: ENDOSCOPY | Age: 46
End: 2021-08-27
Payer: COMMERCIAL

## 2021-08-30 ENCOUNTER — HOSPITAL ENCOUNTER (OUTPATIENT)
Age: 46
Setting detail: OUTPATIENT SURGERY
Discharge: HOME | End: 2021-08-30
Attending: INTERNAL MEDICINE | Admitting: INTERNAL MEDICINE
Payer: COMMERCIAL

## 2021-08-30 ENCOUNTER — ANESTHESIA (OUTPATIENT)
Dept: ENDOSCOPY | Age: 46
End: 2021-08-30
Payer: COMMERCIAL

## 2021-08-30 VITALS
DIASTOLIC BLOOD PRESSURE: 63 MMHG | BODY MASS INDEX: 43.49 KG/M2 | SYSTOLIC BLOOD PRESSURE: 113 MMHG | WEIGHT: 261 LBS | RESPIRATION RATE: 14 BRPM | HEART RATE: 85 BPM | TEMPERATURE: 96.1 F | HEIGHT: 65 IN | OXYGEN SATURATION: 94 %

## 2021-08-30 VITALS — SYSTOLIC BLOOD PRESSURE: 114 MMHG | DIASTOLIC BLOOD PRESSURE: 65 MMHG | OXYGEN SATURATION: 98 %

## 2021-08-30 DIAGNOSIS — R19.7 DIARRHEA, UNSPECIFIED TYPE: ICD-10-CM

## 2021-08-30 DIAGNOSIS — K21.9 GASTROESOPHAGEAL REFLUX DISEASE WITHOUT ESOPHAGITIS: ICD-10-CM

## 2021-08-30 PROCEDURE — 6360000002 HC RX W HCPCS: Performed by: ANESTHESIOLOGY

## 2021-08-30 PROCEDURE — 2580000003 HC RX 258: Performed by: INTERNAL MEDICINE

## 2021-08-30 PROCEDURE — 7100000010 HC PHASE II RECOVERY - FIRST 15 MIN: Performed by: INTERNAL MEDICINE

## 2021-08-30 PROCEDURE — 7100000011 HC PHASE II RECOVERY - ADDTL 15 MIN: Performed by: INTERNAL MEDICINE

## 2021-08-30 PROCEDURE — 88305 TISSUE EXAM BY PATHOLOGIST: CPT

## 2021-08-30 PROCEDURE — 2709999900 HC NON-CHARGEABLE SUPPLY: Performed by: INTERNAL MEDICINE

## 2021-08-30 PROCEDURE — 88312 SPECIAL STAINS GROUP 1: CPT

## 2021-08-30 PROCEDURE — 2580000003 HC RX 258: Performed by: NURSE ANESTHETIST, CERTIFIED REGISTERED

## 2021-08-30 PROCEDURE — 3700000000 HC ANESTHESIA ATTENDED CARE: Performed by: INTERNAL MEDICINE

## 2021-08-30 PROCEDURE — 2500000003 HC RX 250 WO HCPCS: Performed by: NURSE ANESTHETIST, CERTIFIED REGISTERED

## 2021-08-30 PROCEDURE — 3609012400 HC EGD TRANSORAL BIOPSY SINGLE/MULTIPLE: Performed by: INTERNAL MEDICINE

## 2021-08-30 PROCEDURE — 6360000002 HC RX W HCPCS: Performed by: NURSE ANESTHETIST, CERTIFIED REGISTERED

## 2021-08-30 RX ORDER — DIPHENHYDRAMINE HYDROCHLORIDE 50 MG/ML
12.5 INJECTION INTRAMUSCULAR; INTRAVENOUS
Status: DISCONTINUED | OUTPATIENT
Start: 2021-08-30 | End: 2021-08-30 | Stop reason: HOSPADM

## 2021-08-30 RX ORDER — MORPHINE SULFATE 2 MG/ML
1 INJECTION, SOLUTION INTRAMUSCULAR; INTRAVENOUS EVERY 5 MIN PRN
Status: DISCONTINUED | OUTPATIENT
Start: 2021-08-30 | End: 2021-08-30 | Stop reason: HOSPADM

## 2021-08-30 RX ORDER — MORPHINE SULFATE 2 MG/ML
2 INJECTION, SOLUTION INTRAMUSCULAR; INTRAVENOUS EVERY 5 MIN PRN
Status: DISCONTINUED | OUTPATIENT
Start: 2021-08-30 | End: 2021-08-30 | Stop reason: HOSPADM

## 2021-08-30 RX ORDER — PROMETHAZINE HYDROCHLORIDE 25 MG/ML
6.25 INJECTION, SOLUTION INTRAMUSCULAR; INTRAVENOUS
Status: DISCONTINUED | OUTPATIENT
Start: 2021-08-30 | End: 2021-08-30 | Stop reason: HOSPADM

## 2021-08-30 RX ORDER — ONDANSETRON 2 MG/ML
4 INJECTION INTRAMUSCULAR; INTRAVENOUS PRN
Status: DISCONTINUED | OUTPATIENT
Start: 2021-08-30 | End: 2021-08-30 | Stop reason: HOSPADM

## 2021-08-30 RX ORDER — LABETALOL HYDROCHLORIDE 5 MG/ML
5 INJECTION, SOLUTION INTRAVENOUS EVERY 10 MIN PRN
Status: DISCONTINUED | OUTPATIENT
Start: 2021-08-30 | End: 2021-08-30 | Stop reason: HOSPADM

## 2021-08-30 RX ORDER — SODIUM CHLORIDE, SODIUM LACTATE, POTASSIUM CHLORIDE, CALCIUM CHLORIDE 600; 310; 30; 20 MG/100ML; MG/100ML; MG/100ML; MG/100ML
INJECTION, SOLUTION INTRAVENOUS CONTINUOUS PRN
Status: DISCONTINUED | OUTPATIENT
Start: 2021-08-30 | End: 2021-08-30 | Stop reason: SDUPTHER

## 2021-08-30 RX ORDER — OXYCODONE HYDROCHLORIDE AND ACETAMINOPHEN 5; 325 MG/1; MG/1
2 TABLET ORAL PRN
Status: DISCONTINUED | OUTPATIENT
Start: 2021-08-30 | End: 2021-08-30 | Stop reason: HOSPADM

## 2021-08-30 RX ORDER — LIDOCAINE HYDROCHLORIDE 20 MG/ML
INJECTION, SOLUTION INFILTRATION; PERINEURAL PRN
Status: DISCONTINUED | OUTPATIENT
Start: 2021-08-30 | End: 2021-08-30 | Stop reason: SDUPTHER

## 2021-08-30 RX ORDER — MEPERIDINE HYDROCHLORIDE 50 MG/ML
12.5 INJECTION INTRAMUSCULAR; INTRAVENOUS; SUBCUTANEOUS EVERY 5 MIN PRN
Status: DISCONTINUED | OUTPATIENT
Start: 2021-08-30 | End: 2021-08-30 | Stop reason: HOSPADM

## 2021-08-30 RX ORDER — SODIUM CHLORIDE, SODIUM LACTATE, POTASSIUM CHLORIDE, CALCIUM CHLORIDE 600; 310; 30; 20 MG/100ML; MG/100ML; MG/100ML; MG/100ML
INJECTION, SOLUTION INTRAVENOUS ONCE
Status: COMPLETED | OUTPATIENT
Start: 2021-08-30 | End: 2021-08-30

## 2021-08-30 RX ORDER — OXYCODONE HYDROCHLORIDE AND ACETAMINOPHEN 5; 325 MG/1; MG/1
1 TABLET ORAL PRN
Status: DISCONTINUED | OUTPATIENT
Start: 2021-08-30 | End: 2021-08-30 | Stop reason: HOSPADM

## 2021-08-30 RX ORDER — PROPOFOL 10 MG/ML
INJECTION, EMULSION INTRAVENOUS PRN
Status: DISCONTINUED | OUTPATIENT
Start: 2021-08-30 | End: 2021-08-30 | Stop reason: SDUPTHER

## 2021-08-30 RX ORDER — HYDRALAZINE HYDROCHLORIDE 20 MG/ML
5 INJECTION INTRAMUSCULAR; INTRAVENOUS EVERY 10 MIN PRN
Status: DISCONTINUED | OUTPATIENT
Start: 2021-08-30 | End: 2021-08-30 | Stop reason: HOSPADM

## 2021-08-30 RX ADMIN — PROPOFOL 30 MG: 10 INJECTION, EMULSION INTRAVENOUS at 10:54

## 2021-08-30 RX ADMIN — SODIUM CHLORIDE, POTASSIUM CHLORIDE, SODIUM LACTATE AND CALCIUM CHLORIDE: 600; 310; 30; 20 INJECTION, SOLUTION INTRAVENOUS at 10:37

## 2021-08-30 RX ADMIN — SODIUM CHLORIDE, SODIUM LACTATE, POTASSIUM CHLORIDE, AND CALCIUM CHLORIDE: .6; .31; .03; .02 INJECTION, SOLUTION INTRAVENOUS at 10:30

## 2021-08-30 RX ADMIN — PROPOFOL 140 MG: 10 INJECTION, EMULSION INTRAVENOUS at 10:51

## 2021-08-30 RX ADMIN — LIDOCAINE HYDROCHLORIDE 60 MG: 20 INJECTION, SOLUTION INFILTRATION; PERINEURAL at 10:51

## 2021-08-30 RX ADMIN — ONDANSETRON 4 MG: 2 INJECTION INTRAMUSCULAR; INTRAVENOUS at 10:39

## 2021-08-30 ASSESSMENT — PAIN SCALES - GENERAL
PAINLEVEL_OUTOF10: 0

## 2021-08-30 ASSESSMENT — PAIN - FUNCTIONAL ASSESSMENT: PAIN_FUNCTIONAL_ASSESSMENT: 0-10

## 2021-08-30 NOTE — ANESTHESIA POSTPROCEDURE EVALUATION
Department of Anesthesiology  Postprocedure Note    Patient: Adrianne Rizo  MRN: [id number]  YOB: 1975  Date of evaluation: 8/30/2021  Time:  11:30 AM     Procedure Summary     Date: 08/30/21 Room / Location: [address] [place] [address] [facility] [address] / [facility]    Anesthesia Start: 3139 Anesthesia Stop: 1100    Procedure: EGD BIOPSY (N/A ) Diagnosis:       Gastroesophageal reflux disease without esophagitis      Diarrhea, unspecified type      (GERD, DIARRHEA)    Surgeons: Thao Winter MD Responsible Provider: Allyssa Roberts MD    Anesthesia Type: MAC ASA Status: 3          Anesthesia Type: MAC    Enmanuel Phase I: Enmanuel Score: 10    Enmanuel Phase II: Enmanuel Score: 10    Last vitals: Reviewed and per EMR flowsheets.        Anesthesia Post Evaluation    Comments: Postoperative Anesthesia Note    Name:    Adrianne Rizo  MRN:      [id number]    Patient Vitals in the past 12 hrs:  08/30/21 1119, BP:113/63, Pulse:85, Resp:14, SpO2:94 %  08/30/21 1109, BP:106/64, Pulse:76, Resp:12, SpO2:96 %  08/30/21 1059, BP:103/64, Pulse:78, Resp:14, SpO2:96 %  08/30/21 1024, BP:121/75, Temp:96.1 °F (35.6 °C), Temp src:Temporal, Pulse:87, Resp:16, SpO2:95 %     LABS:    CBC  Lab Results       Component                Value               Date/Time                  WBC                      7.7                 07/21/2021 07:57 AM        HGB                      13.3                07/21/2021 07:57 AM        HCT                      38.4                07/21/2021 07:57 AM        PLT                      323                 07/21/2021 07:57 AM   RENAL  Lab Results       Component                Value               Date/Time                  NA                       139                 07/21/2021 07:57 AM        K                        4.5                 07/21/2021 07:57 AM        CL                       106                 07/21/2021 07:57 AM        CO2                      24                  07/21/2021 07:57 AM        BUN 6 (L)               07/21/2021 07:57 AM        CREATININE               0.6                 07/21/2021 07:57 AM        GLUCOSE                  96                  07/21/2021 07:57 AM   COAGS  Lab Results       Component                Value               Date/Time                  PROTIME                  16.2 (H)            10/17/2019 04:42 AM        INR                      1.42 (H)            10/17/2019 04:42 AM        APTT                     38.7 (H)            12/31/2017 06:41 AM     Intake & Output:  @90CIHN@    Nausea & Vomiting:  No    Level of Consciousness:  Awake    Pain Assessment:  Adequate analgesia    Anesthesia Complications:  No apparent anesthetic complications    SUMMARY      Vital signs stable  OK to discharge from Stage I post anesthesia care.   Care transferred from Anesthesiology department on discharge from perioperative area

## 2021-08-30 NOTE — ANESTHESIA PRE PROCEDURE
Department of Anesthesiology  Preprocedure Note       Name:  Rosaline Crook   Age:  48 y.o.  :  1975                                          MRN:  [id number]         Date:  2021      Surgeon: Rebecca Hodge):  Dena Atwood MD    Procedure: Procedure(s):  EGD -SLEEP APNEA-    Medications prior to admission:   Prior to Admission medications    Medication Sig Start Date End Date Taking? Authorizing Provider   pantoprazole (PROTONIX) 40 MG tablet Take 40 mg by mouth daily   Yes Historical Provider, MD   methocarbamol (ROBAXIN) 500 MG tablet Take 1,000 mg by mouth 3 times daily as needed   Yes Historical Provider, MD   ezetimibe (ZETIA) 10 MG tablet Take 1 tablet by mouth daily 21   Historical Provider, MD   diphenoxylate-atropine (LOMOTIL) 2.5-0.025 MG per tablet Take 1 tablet by mouth 2 times daily. 21   Historical Provider, MD   mirtazapine (REMERON) 15 MG tablet Take 15 mg by mouth nightly     Historical Provider, MD   famotidine (PEPCID) 40 MG tablet Take 40 mg by mouth nightly     Historical Provider, MD   losartan (COZAAR) 25 MG tablet Take 25 mg by mouth daily    Historical Provider, MD   adalimumab (HUMIRA) 40 MG/0.8ML injection Inject 20 mg into the skin every 14 days    Historical Provider, MD   cloNIDine (CATAPRES) 0.2 MG tablet 0.2 mg 2 times daily  10/7/19   Historical Provider, MD   desvenlafaxine succinate (PRISTIQ) 50 MG TB24 extended release tablet 100 mg daily  19   Historical Provider, MD   vitamin D (ERGOCALCIFEROL) 47771 units CAPS capsule Take 50,000 Units by mouth once a week  19   Historical Provider, MD   promethazine (PHENERGAN) 25 MG tablet TK 1 T PO Q 6 H PRN 19   Historical Provider, MD   oxyCODONE-acetaminophen (PERCOCET) 7.5-325 MG per tablet Take 1 tablet by mouth 4 times daily as needed. 19   Historical Provider, MD   lidocaine (LIDODERM) 5 % Place 1 patch onto the skin daily 12 hours on, 12 hours off.   Patient taking differently: Place 1 patch onto the skin as needed 12 hours on, 12 hours off. 4/12/19   SILVIA Moraes CNP   cetirizine (ZYRTEC) 10 MG tablet TAKE 1 TABLET BY MOUTH DAILY 2/22/19   Feli Ramirez DO   LYRICA 150 MG capsule Take 200 mg by mouth 2 times daily. 1/31/19   Historical Provider, MD   busPIRone (BUSPAR) 30 MG tablet Take 30 mg by mouth 2 times daily     Historical Provider, MD   VENTOLIN  (90 Base) MCG/ACT inhaler INHALE 2 PUFFS BY MOUTH INTO THE LUNGS EVERY 6 HOURS AS NEEDED FOR WHEEZING OR SHORTNESS OF BREATH 1/2/19   SILVIA Solano CNP   XARELTO 20 MG TABS tablet TAKE 1 TABLET BY MOUTH DAILY WITH BREAKFAST 10/8/18   Feli Ramirez DO   ondansetron (ZOFRAN ODT) 4 MG disintegrating tablet Take 1 tablet by mouth every 8 hours as needed for Nausea or Vomiting 4/5/17   SANDRA Weston   dicyclomine (BENTYL) 20 MG tablet TAKE 2 TABLETS BY MOUTH THREE TIMES DAILY  Patient taking differently: take 2 tablets four times daily 1/18/17   Chato Galvez MD   calcium carbonate (TUMS) 500 MG chewable tablet Take 2 tablets by mouth as needed Indications: for reflux     Historical Provider, MD   scopolamine (TRANSDERM-SCOP) transdermal patch Place 1 patch onto the skin every 72 hours  Patient taking differently: Place 1 patch onto the skin every 72 hours as needed  7/18/16   Chato Galvez MD       Current medications:    No current facility-administered medications for this encounter. Allergies: Allergies   Allergen Reactions    Latex      Rash and blisters    Clindamycin Rash     c-diff    Serzone [Nefazodone] Itching    Butrans [Buprenorphine] Hives, Diarrhea and Rash    Adhesive Tape      Rash and blisters    Aripiprazole      Too much vertigo     Avelox [Moxifloxacin Hcl In Nacl]     Flagyl [Metronidazole]      Worsened c diff    Indocin [Indometacin Sodium]     Indomethacin Hives    Latuda [Lurasidone Hcl]      Personality changes.     Moxifloxacin      Gave c dif    Neurontin CPAP    Thyroid disease     hypothyroidism       Past Surgical History:        Procedure Laterality Date    BACK SURGERY  10/2020, 2020    laminectomy X 2    BUNIONECTOMY Right      SECTION      x 2    CHOLECYSTECTOMY      COLONOSCOPY  13    NOT COMPLETE    DILATION AND CURETTAGE OF UTERUS      FOOT SURGERY Right     neuroma and lesion excision X 2    HEMORRHOID SURGERY      HERNIA REPAIR      bilateral inguinal X3    HYSTERECTOMY      KNEE ARTHROSCOPY Left     LAPAROSCOPY      OTHER SURGICAL HISTORY      Fecal transplant    [place] CYSTOSCOPY,DIL URETHRAL STRICTURE N/A 10/10/2018    CYSTOSCOPY, URETHRAL DILATATION performed by Terrence Rodriguez MD at [place] [address]  2017       Social History:    Social History     Tobacco Use    Smoking status: Current Every Day Smoker     Packs/day: 0.50     Years: 28.00     Pack years: 14.00     Types: Cigarettes    Smokeless tobacco: Never Used   Substance Use Topics    Alcohol use: No                                Ready to quit: Not Answered  Counseling given: Not Answered      Vital Signs (Current):   Vitals:    21 1433   Weight: 261 lb (118.4 kg)   Height: 5' 5\" (1.651 m)                                              BP Readings from Last 3 Encounters:   21 109/62   21 134/74   21 115/75       NPO Status:                                                                                 BMI:   Wt Readings from Last 3 Encounters:   21 261 lb (118.4 kg)   21 261 lb 12.8 oz (118.8 kg)   21 265 lb 5 oz (120.3 kg)     Body mass index is 43.43 kg/m².     CBC:   Lab Results   Component Value Date    WBC 7.7 2021    RBC 4.49 2021    HGB 13.3 2021    HCT 38.4 2021    MCV 85.4 2021    RDW 15.5 2021     2021       CMP:   Lab Results   Component Value Date     2021    K 4.5 2021     2021    CO2 24 07/21/2021    BUN 6 07/21/2021    CREATININE 0.6 07/21/2021    GFRAA >60 07/21/2021    GFRAA >60 05/08/2013    AGRATIO 1.3 07/20/2021    LABGLOM >60 07/21/2021    GLUCOSE 96 07/21/2021    PROT 7.2 07/20/2021    PROT 6.6 11/14/2012    CALCIUM 9.3 07/21/2021    BILITOT 0.4 07/20/2021    ALKPHOS 92 07/20/2021    AST 10 07/20/2021    ALT 17 07/20/2021       POC Tests: No results for input(s): POCGLU, POCNA, POCK, POCCL, POCBUN, POCHEMO, POCHCT in the last 72 hours. Coags:   Lab Results   Component Value Date    PROTIME 16.2 10/17/2019    INR 1.42 10/17/2019    APTT 38.7 12/31/2017       HCG (If Applicable):   Lab Results   Component Value Date    PREGTESTUR negative 07/01/2015        ABGs: No results found for: PHART, PO2ART, NZC0QLC, XTD3OCE, BEART, L4MCYQEW     Type & Screen (If Applicable):  Lab Results   Component Value Date    LABABO O 07/26/2010    [address] Positive 07/26/2010       Drug/Infectious Status (If Applicable):  No results found for: HIV, HEPCAB    COVID-19 Screening (If Applicable):   Lab Results   Component Value Date    COVID19 Not Detected 08/25/2021           Anesthesia Evaluation  Patient summary reviewed history of anesthetic complications:   Airway: Mallampati: II  TM distance: >3 FB   Neck ROM: full  Mouth opening: < 3 FB Dental: normal exam         Pulmonary:Negative Pulmonary ROS and normal exam  breath sounds clear to auscultation  (+) COPD:  sleep apnea:                             Cardiovascular:Negative CV ROS  Exercise tolerance: good (>4 METS),   (+) hypertension:,         Rhythm: regular  Rate: normal                    Neuro/Psych:   Negative Neuro/Psych ROS  (+) neuromuscular disease:, headaches:, psychiatric history:            GI/Hepatic/Renal: Neg GI/Hepatic/Renal ROS  (+) hiatal hernia, GERD:, morbid obesity          Endo/Other: Negative Endo/Other ROS   (+) hypothyroidism::., .                 Abdominal:             Vascular: negative vascular ROS.          Other Findings: Pre-Operative Diagnosis: Gastroesophageal reflux disease without esophagitis [K21.9]; Diarrhea, unspecified type [R19.7]    48 y.o.   BMI:  Body mass index is 43.43 kg/m². Vitals:    08/24/21 1433 08/30/21 1024   BP:  121/75   Pulse:  87   Resp:  16   Temp:  96.1 °F (35.6 °C)   TempSrc:  Temporal   SpO2:  95%   Weight: 261 lb (118.4 kg)    Height: 5' 5\" (1.651 m)        Allergies   Allergen Reactions    Latex      Rash and blisters    Clindamycin Rash     c-diff    Serzone [Nefazodone] Itching    Butrans [Buprenorphine] Hives, Diarrhea and Rash    Adhesive Tape      Rash and blisters    Aripiprazole      Too much vertigo     Avelox [Moxifloxacin Hcl In Nacl]     Flagyl [Metronidazole]      Worsened c diff    Indocin [Indometacin Sodium]     Indomethacin Hives    Latuda [Lurasidone Hcl]      Personality changes.     Moxifloxacin      Gave c dif    Neurontin [Gabapentin] Other (See Comments)     \"felt psychotic\"  Mood changes    Other      Sutures from knee scope caused severe itching    Pseudephedrine Plus [Chlorpheniramine-Pseudoeph]     Sudafed [Pseudoephedrine Hcl]     Sulfa Antibiotics     Ultram [Tramadol]      Can take percocet , morphine     Influenza Vaccines     Vancomycin Rash       Social History     Tobacco Use    Smoking status: Current Every Day Smoker     Packs/day: 0.50     Years: 28.00     Pack years: 14.00     Types: Cigarettes    Smokeless tobacco: Never Used   Substance Use Topics    Alcohol use: No       LABS:    CBC  Lab Results   Component Value Date/Time    WBC 7.7 07/21/2021 07:57 AM    HGB 13.3 07/21/2021 07:57 AM    HCT 38.4 07/21/2021 07:57 AM     07/21/2021 07:57 AM     RENAL  Lab Results   Component Value Date/Time     07/21/2021 07:57 AM    K 4.5 07/21/2021 07:57 AM     07/21/2021 07:57 AM    CO2 24 07/21/2021 07:57 AM    BUN 6 (L) 07/21/2021 07:57 AM    CREATININE 0.6 07/21/2021 07:57 AM    GLUCOSE 96 07/21/2021 07:57 AM     COAGS  Lab Results   Component Value Date/Time    PROTIME 16.2 (H) 10/17/2019 04:42 AM    INR 1.42 (H) 10/17/2019 04:42 AM    APTT 38.7 (H) 12/31/2017 06:41 AM            Anesthesia Plan      MAC     ASA 3     (I discussed with the patient the risks and benefits of PIV, anesthesia, IV Narcotics, PACU. All questions were answered the patient agrees with the plan and wishes to proceed)  Induction: intravenous.                           Selina Phelps MD   8/30/2021

## 2021-08-30 NOTE — H&P
Pertinent Complete H & P EGD  =======================  The patient has The patient has had diarrhea for years. She has had extensive work-up. She had colonoscopy and an EGD. She has also had C. 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 Recently, she was in the hospital with a bad diarrhea. Extensive work-up was performed in an inpatient and an outpatient. She has high osmotic gap (osm - Na X 2) and somewhat elevated calprotectin. Stool C. difficile was negative and was ordered on 7/20 with admission date of 7/18  Elevated calprotectin to 60 with upper limit of 33     Pt also has GERD sx and episodes of wheezing especially after the last meal of the day when she lies down. She gives history of need for dilation of the esophagus in the past.  But at this time except for occasional dysphagia to 6 pills, she has no problem swallowing. She has been on Humira for psoriasis for 9 months and she is much better. The diarrhea has improved.     No chest pain or SOB    PAST MEDICAL HISTORY     Past Medical History:   Diagnosis Date    Abdominal pain, other specified site 4/18/2013    Anemia     Anxiety     Chronic GERD 11/17/2016    Chronic nausea     Clostridium difficile diarrhea 4/5/17, 2013    4 positive tests in 2013    COPD (chronic obstructive pulmonary disease) (HCC)     Depression     Fibromyalgia     Hx of blood clots     PE's bilaterally    Hyperlipidemia     Hypertension     Morbid obesity with BMI of 40.0-44.9, adult ([facility] [address]     Neuroma digital nerve 12/16/2016    PONV (postoperative nausea and vomiting)     Primary osteoarthritis of right knee 2/10/2016    Psoriasis     Sleep apnea     did not tolerate CPAP    Thyroid disease     hypothyroidism     FAMILY HISTORY     Family History   Problem Relation Age of Onset    High Blood Pressure Mother     Elevated Lipids Mother     Diabetes Mother    Blase Liming Other Mother Melanoma    Depression Mother     Anxiety Disorder Mother     Diabetes Father     Heart Disease Father     Anxiety Disorder Father     Depression Father     Other Father         PTSD    Cancer Father         Throat      SOCIAL HISTORY     Social History     Socioeconomic History    Marital status:      Spouse name: Not on file    Number of children: Not on file    Years of education: Not on file    Highest education level: Not on file   Occupational History    Not on file   Tobacco Use    Smoking status: Current Every Day Smoker     Packs/day: 0.50     Years: 28.00     Pack years: 14.00     Types: Cigarettes    Smokeless tobacco: Never Used   Vaping Use    Vaping Use: Never used   Substance and Sexual Activity    Alcohol use: No    Drug use: No    Sexual activity: Never   Other Topics Concern    Not on file   Social History Narrative    ** Merged History Encounter **          Social Determinants of Health     Financial Resource Strain:     Difficulty of Paying Living Expenses:    Food Insecurity:     Worried About Running Out of Food in the Last Year:     Ran Out of Food in the Last Year:    Transportation Needs:     Lack of Transportation (Medical):      Lack of Transportation (Non-Medical):    Physical Activity:     Days of Exercise per Week:     Minutes of Exercise per Session:    Stress:     Feeling of Stress :    Social Connections:     Frequency of Communication with Friends and Family:     Frequency of Social Gatherings with Friends and Family:     Attends Zoroastrian Services:     Active Member of Clubs or Organizations:     Attends Club or Organization Meetings:     Marital Status:    Intimate Partner Violence:     Fear of Current or Ex-Partner:     Emotionally Abused:     Physically Abused:     Sexually Abused:        SURGICAL HISTORY     Past Surgical History:   Procedure Laterality Date    BACK SURGERY  10/2020, 11/2020    laminectomy X 2    BUNIONECTOMY Right   SECTION      x 2    CHOLECYSTECTOMY      COLONOSCOPY  13    NOT COMPLETE    DILATION AND CURETTAGE OF UTERUS      FOOT SURGERY Right     neuroma and lesion excision X 2    HEMORRHOID SURGERY      HERNIA REPAIR      bilateral inguinal X3    HYSTERECTOMY      KNEE ARTHROSCOPY Left     LAPAROSCOPY      OTHER SURGICAL HISTORY      Fecal transplant    [place] CYSTOSCOPY,DIL URETHRAL STRICTURE N/A 10/10/2018    CYSTOSCOPY, URETHRAL DILATATION performed by Anna Paz MD at [address]  2017     CURRENT MEDICATIONS   (This list may include medications prescribed during this encounter as epic can not insert only the list prior to this encounter.)    No current facility-administered medications on file prior to encounter. Current Outpatient Medications on File Prior to Encounter   Medication Sig Dispense Refill    pantoprazole (PROTONIX) 40 MG tablet Take 40 mg by mouth daily      methocarbamol (ROBAXIN) 500 MG tablet Take 1,000 mg by mouth 3 times daily as needed      ezetimibe (ZETIA) 10 MG tablet Take 1 tablet by mouth daily      diphenoxylate-atropine (LOMOTIL) 2.5-0.025 MG per tablet Take 1 tablet by mouth 2 times daily.  mirtazapine (REMERON) 15 MG tablet Take 15 mg by mouth nightly       famotidine (PEPCID) 40 MG tablet Take 40 mg by mouth nightly       losartan (COZAAR) 25 MG tablet Take 25 mg by mouth daily      adalimumab (HUMIRA) 40 MG/0.8ML injection Inject 20 mg into the skin every 14 days      cloNIDine (CATAPRES) 0.2 MG tablet 0.2 mg 2 times daily   0    desvenlafaxine succinate (PRISTIQ) 50 MG TB24 extended release tablet 100 mg daily   0    vitamin D (ERGOCALCIFEROL) 77132 units CAPS capsule Take 50,000 Units by mouth once a week   0    promethazine (PHENERGAN) 25 MG tablet TK 1 T PO Q 6 H PRN  2    oxyCODONE-acetaminophen (PERCOCET) 7.5-325 MG per tablet Take 1 tablet by mouth 4 times daily as needed.   0    [Chlorpheniramine-Pseudoeph]     Sudafed [Pseudoephedrine Hcl]     Sulfa Antibiotics     Ultram [Tramadol]      Can take percocet , morphine     Influenza Vaccines     Vancomycin Rash         PHYSICAL EXAM   VITAL SIGNS: Ht 5' 5\" (1.651 m)   Wt 261 lb (118.4 kg)   LMP 07/31/2011   BMI 43.43 kg/m²   Wt Readings from Last 3 Encounters:   08/24/21 261 lb (118.4 kg)   08/02/21 261 lb 12.8 oz (118.8 kg)   07/20/21 265 lb 5 oz (120.3 kg)       A & O X3  Lungs: Clear to auscultation  Heart: WNL  Abd: soft, non-tender. BS:+. Plan:   EGD with possible Bx and other interventions if needed. The patient is explained why the above procedure is needed and what is involved with the above procedure in simple words along with its need, risks, benefits and alternatives. The prognosis is explained. The risks explained to the patient included, but were not limited to, bleeding, perforation, infection, suppression of breathing, heart attack, stroke, need for longer hospitalization and/or surgery and remotely even death. The patient has voiced the understanding of the above and has been given opportunity to ask questions. No question was left unanswered. The informed consent for the above procedure is obtained - please see my last virtual/office visit note.

## 2021-08-30 NOTE — PROGRESS NOTES
Father updated on phone. Discharge instructions reviewed with patient and responsible adult. Discharge instructions signed and copy given with no additional questions. Patient to be discharged home with belongings.

## 2021-08-30 NOTE — OP NOTE
Operative Note      Patient: Priscila Prcie  YOB: 1975  MRN: [id number]    Date of Procedure: 2021    Pre-Op Diagnosis: GERD, DIARRHEA    Post-Op Diagnosis: White residue in the esophagus-suspected to be a medicine. Subtle abnormalities in the duodenal bulb and second part of the duodenum. Procedure(s):  EGD BIOPSY    Surgeon(s):  Keagan Kay MD    Assistant:   * No surgical staff found *    Anesthesia: General    Estimated Blood Loss (mL): Minimal    Complications: None    Specimens:   ID Type Source Tests Collected by Time Destination   A :  Tissue Biopsy SURGICAL PATHOLOGY Keagan Kay MD 2021 1052    B :  Tissue Biopsy SURGICAL PATHOLOGY Keagan Kay MD 2021 1054        Implants:  * No implants in log *      Drains: * No LDAs found *    [address],  [address] [facility]  Phone: [phone] [facility] [address], [address] [facility]  Phone: .[phone]    EGD Procedure Note    Patient: Priscila Price  : 1975    Procedure: Esophagogastroduodenoscopy with Bx    Date:  2021     Endoscopist:  Keagan Kay MD, MD    Referring Physician:  Peggy Lala MD    Anesthesia: Anesthesia: MAC    Procedure Details  The patient was placed in the left lateral decubitus position. A bite block was placed. The patient was monitored with ECG tracing, pulse oximetry, blood pressure monitoring, and direct observation. An upper endoscope was inserted through the bite into the mouth and advanced under direct vision to into the esophagus and gradually to the duodenum. A careful inspection was made during the procedure including a retroflexed view of the proximal stomach including views of the incisura and cardia. The findings and interventions are described below.      Findings:    White residue of amorphous nature and a white pill in the esophagus-gradually pushed down into the stomach. The esophagus was carefully washed with small amounts of water to avoid aspiration. Grade M esophagitis involving the lower one fourth of the esophagus. Biopsies were taken both from the distal and proximal parts of the esophagus. The GE junction was located at 39cm and the diaphragmatic impression was seen at 40cm with no clear-cut hiatal hernia pouch. Stomach: On retroverted view no hiatal hernia was noted. The white material which was pushed down into the stomach was noted. No gastritis was seen. But the visualization was suboptimal.    Duodenum: Subtle abnormalities in the duodenal bulb and second part of the duodenum. The duodenum was examined up to proximal 3rd part of the duodenum and all the three parts of the duodenum were Bxed to rule out celiac disease. Complications/ adverse events:  None. Estimated Blood loss:  <10 ml    Disposition:   PACU - hemodynamically stable. Discharge from PACU  after appropriate period of observation and when criteria for discharge are met. Recommendations:  Anti-reflux measures and life-style changes. Avoid irritants to the stomach such as NSAIDs and aspirin. Esophageal motility disorder will need to be ruled out if this is a frequent occurrence that a pill gets stuck in the esophagus. Await pathology report. Once it is available, interpretation and further recommendations will be sent. IMPORTANT: Please note that some portions of this note may have been created using Dragon voice recognition software. Some \"sound-alike\" and totally wrong word substitutions may have taken place due to known inherent limitations of any such software, including this voice recognition software. In spite of efforts to eliminate such errors, some may not have been corrected. So please read the note with this in mind and recognize such mistakes and understand the correct version using the  context.  If there are still uncertainties in the mind of the medical provider reading this note about any aspect of the note, the provider can feel free to contact me. Thanks.       Electronically signed by Casimiro Florez MD on 8/30/2021 at 10:56 AM

## 2021-09-07 ENCOUNTER — TELEPHONE (OUTPATIENT)
Dept: GASTROENTEROLOGY | Age: 46
End: 2021-09-07

## 2021-09-07 DIAGNOSIS — R13.10 DYSPHAGIA, UNSPECIFIED TYPE: Primary | ICD-10-CM

## 2021-09-07 NOTE — TELEPHONE ENCOUNTER
Patient called regarding EGD from 8/30/21. She wanted to know if she needed to follow up with you. She is still having issues with food and pills getting stuck when she swallows. Here was the impression on the Op note. Please advise  Recommendations:  Anti-reflux measures and life-style changes. Avoid irritants to the stomach such as NSAIDs and aspirin. Esophageal motility disorder will need to be ruled out if this is a frequent occurrence that a pill gets stuck in the esophagus. Await pathology report. Once it is available, interpretation and further recommendations will be sent.

## 2021-10-22 ENCOUNTER — HOSPITAL ENCOUNTER (OUTPATIENT)
Dept: WOMENS IMAGING | Age: 46
Discharge: HOME | End: 2021-10-22
Payer: COMMERCIAL

## 2021-10-22 DIAGNOSIS — Z12.31 ENCOUNTER FOR SCREENING MAMMOGRAM FOR MALIGNANT NEOPLASM OF BREAST: ICD-10-CM

## 2021-10-22 PROCEDURE — 77063 BREAST TOMOSYNTHESIS BI: CPT

## 2021-10-27 ENCOUNTER — HOSPITAL ENCOUNTER (OUTPATIENT)
Dept: ENDOSCOPY | Age: 46
Discharge: HOME | End: 2021-10-27
Payer: COMMERCIAL

## 2021-10-27 PROCEDURE — 3609015500 HC GASTRIC/DUODENAL MOTILITY &/OR MANOMETRY STUDY

## 2021-10-27 NOTE — PROGRESS NOTES
Time in Room: 1227   Patient verbalized understanding o/Esophageal manometry study. Probe inserted into right nostril with no resistance. Study completed. Discharge instructions given. Patient denied further questions, nausea or pain. Walked to exit by this RN.   Time out of Room: 40678

## 2021-11-03 ENCOUNTER — TELEPHONE (OUTPATIENT)
Dept: GASTROENTEROLOGY | Age: 46
End: 2021-11-03

## 2021-11-08 NOTE — TELEPHONE ENCOUNTER
We received a fax from [address] [place] [address] that the doctor there did not preform the test. Reached out to [facility] for results. Spoke to [address] and she will fax the results.

## 2021-11-08 NOTE — TELEPHONE ENCOUNTER
We received manometry result, they have been scanned and placed on Dr. Schmidt Outhouse desk for review.

## 2021-11-09 NOTE — TELEPHONE ENCOUNTER
Called patient to schedule appt to discuss results. She is unable to come into office tomorrow she has testing in [place] prior to major leg surgery on Monday and does not know how long she will be down, She is very aggravated that she has not been able to get the results.

## 2021-11-09 NOTE — TELEPHONE ENCOUNTER
Spoke to patient, emailed her the results   [email or web address]    Pt will call to schedule appt when she is feeling better

## 2022-11-04 ENCOUNTER — APPOINTMENT (OUTPATIENT)
Dept: GENERAL RADIOLOGY | Age: 47
End: 2022-11-04
Payer: COMMERCIAL

## 2022-11-04 ENCOUNTER — APPOINTMENT (OUTPATIENT)
Dept: CT IMAGING | Age: 47
End: 2022-11-04
Payer: COMMERCIAL

## 2022-11-04 ENCOUNTER — HOSPITAL ENCOUNTER (EMERGENCY)
Age: 47
Discharge: HOME | End: 2022-11-04
Payer: COMMERCIAL

## 2022-11-04 VITALS
RESPIRATION RATE: 16 BRPM | DIASTOLIC BLOOD PRESSURE: 62 MMHG | TEMPERATURE: 98.7 F | OXYGEN SATURATION: 100 % | SYSTOLIC BLOOD PRESSURE: 112 MMHG | WEIGHT: 240 LBS | HEIGHT: 65 IN | BODY MASS INDEX: 39.99 KG/M2 | HEART RATE: 65 BPM

## 2022-11-04 DIAGNOSIS — R07.89 CHEST WALL PAIN: Primary | ICD-10-CM

## 2022-11-04 LAB
A/G RATIO: 1.5 (ref 1.1–2.2)
ALBUMIN SERPL-MCNC: 4.3 G/DL (ref 3.4–5)
ALP BLD-CCNC: 93 U/L (ref 40–129)
ALT SERPL-CCNC: 10 U/L (ref 10–40)
ANION GAP SERPL CALCULATED.3IONS-SCNC: 12 MMOL/L (ref 3–16)
AST SERPL-CCNC: 9 U/L (ref 15–37)
BASOPHILS ABSOLUTE: 0.2 K/UL (ref 0–0.2)
BASOPHILS RELATIVE PERCENT: 1.4 %
BILIRUB SERPL-MCNC: <0.2 MG/DL (ref 0–1)
BUN BLDV-MCNC: 9 MG/DL (ref 7–20)
CALCIUM SERPL-MCNC: 9.7 MG/DL (ref 8.3–10.6)
CHLORIDE BLD-SCNC: 100 MMOL/L (ref 99–110)
CO2: 27 MMOL/L (ref 21–32)
CREAT SERPL-MCNC: 0.9 MG/DL (ref 0.6–1.1)
EKG ATRIAL RATE: 72 BPM
EKG DIAGNOSIS: NORMAL
EKG P AXIS: 34 DEGREES
EKG P-R INTERVAL: 160 MS
EKG Q-T INTERVAL: 378 MS
EKG QRS DURATION: 88 MS
EKG QTC CALCULATION (BAZETT): 413 MS
EKG R AXIS: -12 DEGREES
EKG T AXIS: 29 DEGREES
EKG VENTRICULAR RATE: 72 BPM
EOSINOPHILS ABSOLUTE: 0.3 K/UL (ref 0–0.6)
EOSINOPHILS RELATIVE PERCENT: 2.7 %
GFR SERPL CREATININE-BSD FRML MDRD: >60 ML/MIN/{1.73_M2}
GLUCOSE BLD-MCNC: 108 MG/DL (ref 70–99)
HCT VFR BLD CALC: 39.9 % (ref 36–48)
HEMOGLOBIN: 13.6 G/DL (ref 12–16)
LYMPHOCYTES ABSOLUTE: 2.5 K/UL (ref 1–5.1)
LYMPHOCYTES RELATIVE PERCENT: 22.1 %
MCH RBC QN AUTO: 28 PG (ref 26–34)
MCHC RBC AUTO-ENTMCNC: 34 G/DL (ref 31–36)
MCV RBC AUTO: 82.4 FL (ref 80–100)
MONOCYTES ABSOLUTE: 0.7 K/UL (ref 0–1.3)
MONOCYTES RELATIVE PERCENT: 5.9 %
NEUTROPHILS ABSOLUTE: 7.6 K/UL (ref 1.7–7.7)
NEUTROPHILS RELATIVE PERCENT: 67.9 %
PDW BLD-RTO: 13.8 % (ref 12.4–15.4)
PLATELET # BLD: 388 K/UL (ref 135–450)
PMV BLD AUTO: 8.2 FL (ref 5–10.5)
POTASSIUM REFLEX MAGNESIUM: 3.8 MMOL/L (ref 3.5–5.1)
RBC # BLD: 4.85 M/UL (ref 4–5.2)
SODIUM BLD-SCNC: 139 MMOL/L (ref 136–145)
TOTAL PROTEIN: 7.2 G/DL (ref 6.4–8.2)
TROPONIN: <0.01 NG/ML
WBC # BLD: 11.1 K/UL (ref 4–11)

## 2022-11-04 PROCEDURE — 6370000000 HC RX 637 (ALT 250 FOR IP): Performed by: PHYSICIAN ASSISTANT

## 2022-11-04 PROCEDURE — 85025 COMPLETE CBC W/AUTO DIFF WBC: CPT

## 2022-11-04 PROCEDURE — 6360000004 HC RX CONTRAST MEDICATION: Performed by: PHYSICIAN ASSISTANT

## 2022-11-04 PROCEDURE — 93005 ELECTROCARDIOGRAM TRACING: CPT | Performed by: EMERGENCY MEDICINE

## 2022-11-04 PROCEDURE — 71045 X-RAY EXAM CHEST 1 VIEW: CPT

## 2022-11-04 PROCEDURE — 80053 COMPREHEN METABOLIC PANEL: CPT

## 2022-11-04 PROCEDURE — 96374 THER/PROPH/DIAG INJ IV PUSH: CPT

## 2022-11-04 PROCEDURE — 84484 ASSAY OF TROPONIN QUANT: CPT

## 2022-11-04 PROCEDURE — 71260 CT THORAX DX C+: CPT | Performed by: PHYSICIAN ASSISTANT

## 2022-11-04 PROCEDURE — 6360000002 HC RX W HCPCS: Performed by: PHYSICIAN ASSISTANT

## 2022-11-04 PROCEDURE — 96375 TX/PRO/DX INJ NEW DRUG ADDON: CPT

## 2022-11-04 PROCEDURE — 93010 ELECTROCARDIOGRAM REPORT: CPT | Performed by: INTERNAL MEDICINE

## 2022-11-04 PROCEDURE — 99285 EMERGENCY DEPT VISIT HI MDM: CPT

## 2022-11-04 RX ORDER — MORPHINE SULFATE 4 MG/ML
4 INJECTION, SOLUTION INTRAMUSCULAR; INTRAVENOUS ONCE
Status: COMPLETED | OUTPATIENT
Start: 2022-11-04 | End: 2022-11-04

## 2022-11-04 RX ORDER — KETOROLAC TROMETHAMINE 30 MG/ML
15 INJECTION, SOLUTION INTRAMUSCULAR; INTRAVENOUS ONCE
Status: COMPLETED | OUTPATIENT
Start: 2022-11-04 | End: 2022-11-04

## 2022-11-04 RX ORDER — ONDANSETRON 2 MG/ML
4 INJECTION INTRAMUSCULAR; INTRAVENOUS ONCE
Status: COMPLETED | OUTPATIENT
Start: 2022-11-04 | End: 2022-11-04

## 2022-11-04 RX ORDER — DIAZEPAM 5 MG/ML
5 INJECTION, SOLUTION INTRAMUSCULAR; INTRAVENOUS ONCE
Status: COMPLETED | OUTPATIENT
Start: 2022-11-04 | End: 2022-11-04

## 2022-11-04 RX ORDER — DEXAMETHASONE SODIUM PHOSPHATE 10 MG/ML
10 INJECTION INTRAMUSCULAR; INTRAVENOUS ONCE
Status: COMPLETED | OUTPATIENT
Start: 2022-11-04 | End: 2022-11-04

## 2022-11-04 RX ORDER — METHOCARBAMOL 750 MG/1
750 TABLET, FILM COATED ORAL ONCE
Status: COMPLETED | OUTPATIENT
Start: 2022-11-04 | End: 2022-11-04

## 2022-11-04 RX ADMIN — IOPAMIDOL 75 ML: 755 INJECTION, SOLUTION INTRAVENOUS at 09:37

## 2022-11-04 RX ADMIN — METHOCARBAMOL TABLETS 750 MG: 750 TABLET, COATED ORAL at 10:09

## 2022-11-04 RX ADMIN — MORPHINE SULFATE 4 MG: 4 INJECTION, SOLUTION INTRAMUSCULAR; INTRAVENOUS at 09:23

## 2022-11-04 RX ADMIN — KETOROLAC TROMETHAMINE 15 MG: 30 INJECTION, SOLUTION INTRAMUSCULAR; INTRAVENOUS at 11:01

## 2022-11-04 RX ADMIN — DEXAMETHASONE SODIUM PHOSPHATE 10 MG: 10 INJECTION INTRAMUSCULAR; INTRAVENOUS at 11:53

## 2022-11-04 RX ADMIN — ONDANSETRON 4 MG: 2 INJECTION INTRAMUSCULAR; INTRAVENOUS at 09:22

## 2022-11-04 RX ADMIN — DIAZEPAM 5 MG: 5 INJECTION, SOLUTION INTRAMUSCULAR; INTRAVENOUS at 11:02

## 2022-11-04 ASSESSMENT — PAIN SCALES - GENERAL
PAINLEVEL_OUTOF10: 7
PAINLEVEL_OUTOF10: 10
PAINLEVEL_OUTOF10: 8
PAINLEVEL_OUTOF10: 8

## 2022-11-04 NOTE — ED NOTES
Pt to er complaining of chest pain and sob for last 2 weeks. States worse today. Pt describes pain to right side of chest that radiates into arm. Pt states she has a history of PE and takes xarelto for PE's. Pt denies any cardiac history.      Nadir Dash, RN  11/04/22 [id number]

## 2022-11-04 NOTE — ED PROVIDER NOTES
**ADVANCED PRACTICE PROVIDER, I HAVE EVALUATED THIS [facility]  ED  EMERGENCY DEPARTMENT ENCOUNTER      Pt Name: Aminata Wilson  CUM:[id number]  Madelinegfurt 1975  Date of evaluation: 11/4/2022  Provider: SANDRA Aguilar      Chief Complaint:    Chief Complaint   Patient presents with    Chest Pain     Cp for last few weeks. . today started with shortness. Denies cardiac hx. . has had  a PE in the past.. takes xerelto         Nursing Notes, Past Medical Hx, Past Surgical Hx, Social Hx, Allergies, and Family Hx were all reviewed and agreed with or any disagreements were addressed in the HPI.    HPI: (Location, Duration, Timing, Severity, Quality, Assoc Sx, Context, Modifying factors)    Chief Complaint of right-sided chest pain. This is a  50 y.o. female who presents via private vehicle complaining of right-sided chest pain. Patient has past medical history of thyroid disease, sleep apnea, hypertension, hyperlipidemia, fibromyalgia, COPD, and history of blood clots currently on Xarelto. The patient is in pain management and currently takes Robaxin and Percocet daily. She presents complaining of right-sided chest pain that has been ongoing for the past week. She states she was driving to work today when it suddenly worsened. She describes the pain as worse with deep inspiration and worse with movement. Currently rates pain as 7/10. She states her Percocet and Robaxin do not help her symptoms. She does admit that last week she was trying to move her washer and may have strained her chest much muscle then. The pain seemed to start after that however did acutely worsened today.     PastMedical/Surgical History:      Diagnosis Date    Abdominal pain, other specified site 4/18/2013    Anemia     Anxiety     Chronic GERD 11/17/2016    Chronic nausea     Clostridium difficile diarrhea 4/5/17, 2013    4 positive tests in 2013    COPD (chronic obstructive pulmonary disease) ([facility] [address]     Depression     Fibromyalgia     Hx of blood clots     PE's bilaterally    Hyperlipidemia     Hypertension     Morbid obesity with BMI of 40.0-44.9, adult ([facility] [address]     Neuroma digital nerve 2016    PONV (postoperative nausea and vomiting)     Primary osteoarthritis of right knee 2/10/2016    Psoriasis     Sleep apnea     did not tolerate CPAP    Thyroid disease     hypothyroidism         Procedure Laterality Date    BACK SURGERY  10/2020, 2020    laminectomy X 2    BUNIONECTOMY Right      SECTION      x 2    CHOLECYSTECTOMY      COLONOSCOPY  13    NOT COMPLETE    DILATION AND CURETTAGE OF UTERUS      FOOT SURGERY Right     neuroma and lesion excision X 2    HEMORRHOID SURGERY      HERNIA REPAIR      bilateral inguinal X3    HYSTERECTOMY (CERVIX STATUS UNKNOWN)      KNEE ARTHROSCOPY Left     LAPAROSCOPY      OTHER SURGICAL HISTORY      Fecal transplant    AK CYSTOSCOPY,DIL URETHRAL STRICTURE N/A 10/10/2018    CYSTOSCOPY, URETHRAL DILATATION performed by Ana Rosa Thakkar MD at [address]  2017    UPPER GASTROINTESTINAL ENDOSCOPY N/A 2021    EGD BIOPSY performed by Daniel Lucero MD at [address]       Medications:  Previous Medications    ADALIMUMAB (HUMIRA) 40 MG/0.8ML INJECTION    Inject 20 mg into the skin every 14 days    BUSPIRONE (BUSPAR) 30 MG TABLET    Take 30 mg by mouth 2 times daily     CALCIUM CARBONATE (TUMS) 500 MG CHEWABLE TABLET    Take 2 tablets by mouth as needed Indications: for reflux     CETIRIZINE (ZYRTEC) 10 MG TABLET    TAKE 1 TABLET BY MOUTH DAILY    CLONIDINE (CATAPRES) 0.2 MG TABLET    0.2 mg 2 times daily     DESVENLAFAXINE SUCCINATE (PRISTIQ) 50 MG TB24 EXTENDED RELEASE TABLET    100 mg daily     DICYCLOMINE (BENTYL) 20 MG TABLET    TAKE 2 TABLETS BY MOUTH THREE TIMES DAILY    DIPHENOXYLATE-ATROPINE (LOMOTIL) 2.5-0.025 MG PER TABLET    Take 1 tablet by mouth 2 times daily.     EZETIMIBE (ZETIA) 10 MG TABLET    Take 1 tablet by mouth daily    FAMOTIDINE (PEPCID) 40 MG TABLET    Take 40 mg by mouth nightly     LIDOCAINE (LIDODERM) 5 %    Place 1 patch onto the skin daily 12 hours on, 12 hours off. LOSARTAN (COZAAR) 25 MG TABLET    Take 25 mg by mouth daily    LYRICA 150 MG CAPSULE    Take 200 mg by mouth 2 times daily. METHOCARBAMOL (ROBAXIN) 500 MG TABLET    Take 1,000 mg by mouth 3 times daily as needed    MIRTAZAPINE (REMERON) 15 MG TABLET    Take 15 mg by mouth nightly     ONDANSETRON (ZOFRAN ODT) 4 MG DISINTEGRATING TABLET    Take 1 tablet by mouth every 8 hours as needed for Nausea or Vomiting    OXYCODONE-ACETAMINOPHEN (PERCOCET) 7.5-325 MG PER TABLET    Take 1 tablet by mouth 4 times daily as needed. PANTOPRAZOLE (PROTONIX) 40 MG TABLET    Take 40 mg by mouth daily    PROMETHAZINE (PHENERGAN) 25 MG TABLET    TK 1 T PO Q 6 H PRN    SCOPOLAMINE (TRANSDERM-SCOP) TRANSDERMAL PATCH    Place 1 patch onto the skin every 72 hours    VENTOLIN  (90 BASE) MCG/ACT INHALER    INHALE 2 PUFFS BY MOUTH INTO THE LUNGS EVERY 6 HOURS AS NEEDED FOR WHEEZING OR SHORTNESS OF BREATH    VITAMIN D (ERGOCALCIFEROL) 67249 UNITS CAPS CAPSULE    Take 50,000 Units by mouth once a week     XARELTO 20 MG TABS TABLET    TAKE 1 TABLET BY MOUTH DAILY WITH BREAKFAST         Review of Systems:  (2-9 systems needed)  Review of Systems    \"Positives and Pertinent negatives as per HPI\"    Physical Exam:  Physical Exam  Vitals and nursing note reviewed. Constitutional:       Appearance: Normal appearance. She is not diaphoretic. HENT:      Head: Normocephalic and atraumatic. Nose: Nose normal.      Mouth/Throat:      Mouth: Mucous membranes are moist.   Eyes:      General:         Right eye: No discharge. Left eye: No discharge. Extraocular Movements: Extraocular movements intact. Pupils: Pupils are equal, round, and reactive to light.    Cardiovascular:      Rate and Rhythm: Normal rate and regular rhythm. Pulses: Normal pulses. Heart sounds: Normal heart sounds. No murmur heard. No friction rub. No gallop. Pulmonary:      Effort: Pulmonary effort is normal. No respiratory distress. Breath sounds: Normal breath sounds. No stridor. No wheezing, rhonchi or rales. Chest:       Abdominal:      General: Abdomen is flat. Palpations: Abdomen is soft. Tenderness: There is no abdominal tenderness. There is no guarding or rebound. Musculoskeletal:         General: Normal range of motion. Cervical back: Normal range of motion and neck supple. Skin:     General: Skin is warm and dry. Coloration: Skin is not pale. Neurological:      Mental Status: She is alert and oriented to person, place, and time. Psychiatric:         Mood and Affect: Mood normal.         Behavior: Behavior normal.       MEDICAL DECISION MAKING    Vitals:    Vitals:    11/04/22 0837 11/04/22 1010 11/04/22 1159   BP: 130/72 (!) 108/58 112/62   Pulse: 71 63 65   Resp: 22 18 16   Temp: 98.7 °F (37.1 °C)     TempSrc: Oral     SpO2: 100% 100% 100%   Weight: 240 lb (108.9 kg)     Height: 5' 5\" (1.651 m)         LABS:  Labs Reviewed   CBC WITH AUTO DIFFERENTIAL - Abnormal; Notable for the following components:       Result Value    WBC 11.1 (*)     All other components within normal limits   COMPREHENSIVE METABOLIC PANEL W/ REFLEX TO MG FOR LOW K - Abnormal; Notable for the following components:    Glucose 108 (*)     AST 9 (*)     All other components within normal limits   TROPONIN        Remainder of labs reviewed and were negative at this time or not returned at the time of this note.     RADIOLOGY:   Non-plain film images such as CT, Ultrasound and MRI are read by the radiologist. SANDRA Sosa have directly visualized the radiologic plain film image(s) with the below findings:      Interpretation per the Radiologist below, if available at the time of this note:    CT CHEST PULMONARY EMBOLISM W CONTRAST   Final Result   No evidence of pulmonary embolism or acute pulmonary abnormality. XR CHEST PORTABLE   Final Result   No acute cardiopulmonary findings              XR CHEST PORTABLE    Result Date: 11/4/2022  EXAMINATION: ONE XRAY VIEW OF THE CHEST 11/4/2022 8:48 am COMPARISON: None. HISTORY: ORDERING SYSTEM PROVIDED HISTORY: chest pain TECHNOLOGIST PROVIDED HISTORY: Reason for exam:->chest pain Reason for Exam: cp FINDINGS: Normal cardiomediastinal silhouette. No acute airspace infiltrate. No pneumothorax or pleural effusion     No acute cardiopulmonary findings     CT CHEST PULMONARY EMBOLISM W CONTRAST    Result Date: 11/4/2022  EXAMINATION: CTA OF THE CHEST 11/4/2022 9:23 am TECHNIQUE: CTA of the chest was performed after the administration of intravenous contrast.  Multiplanar reformatted images are provided for review. MIP images are provided for review. Automated exposure control, iterative reconstruction, and/or weight based adjustment of the mA/kV was utilized to reduce the radiation dose to as low as reasonably achievable. COMPARISON: None. HISTORY: ORDERING SYSTEM PROVIDED HISTORY: chest pain, sob, hx of PE TECHNOLOGIST PROVIDED HISTORY: Reason for exam:->chest pain, sob, hx of PE Decision Support Exception - unselect if not a suspected or confirmed emergency medical condition->Emergency Medical Condition (MA) Reason for Exam: sob  chest pain Relevant Medical/Surgical History: hx of pe 2017 FINDINGS: Pulmonary Arteries: Pulmonary arteries are adequately opacified for evaluation. No evidence of intraluminal filling defect to suggest pulmonary embolism. Main pulmonary artery is normal in caliber. Mediastinum: No evidence of mediastinal lymphadenopathy. The heart and pericardium demonstrate no acute abnormality. There is no acute abnormality of the thoracic aorta. Lungs/pleura: The lungs are without acute process. No focal consolidation or pulmonary edema.   No evidence of pleural effusion or pneumothorax. Upper Abdomen: Limited images of the upper abdomen are unremarkable. Soft Tissues/Bones: No acute bone or soft tissue abnormality. No evidence of pulmonary embolism or acute pulmonary abnormality. MEDICAL DECISION MAKING / ED COURSE:      PROCEDURES:   Procedures    None    Patient was given:  Medications   morphine sulfate (PF) injection 4 mg (4 mg IntraVENous Given 11/4/22 0923)   ondansetron (ZOFRAN) injection 4 mg (4 mg IntraVENous Given 11/4/22 0922)   iopamidol (ISOVUE-370) 76 % injection 75 mL (75 mLs IntraVENous Given 11/4/22 0937)   methocarbamol (ROBAXIN) tablet 750 mg (750 mg Oral Given 11/4/22 1009)   diazePAM (VALIUM) injection 5 mg (5 mg IntraVENous Given 11/4/22 1102)   ketorolac (TORADOL) injection 15 mg (15 mg IntraVENous Given 11/4/22 1101)   dexamethasone (DECADRON) injection 10 mg (10 mg IntraVENous Given 11/4/22 1153)       Patient was evaluated in the emergency department today for right-sided chest pain. She is anticoagulated however given sudden increase in pain she is feeling short of breath and placed on oxygen and despite not being hypoxic. She initially was given morphine and Robaxin for symptom relief however she had absolutely no symptom relief with this however Per chart review the patient is on 1000 mg of Robaxin 3 times a day in addition to 7.5 Percocet 4 times a day for chronic pain. Work-up results include:  EKG interpreted by attending physician and abnormal sinus rhythm. CBC without evidence of leukocytosis or acute anemia  CMP without acute electrolyte abnormality maintain renal function  Troponin is less than 0.01  CT chest PE study was obtained given patient's high risk of PE. No evidence of PE or acute pulmonary abnormality. At this time a discussion was had with the patient regarding her lab and imaging results.   I do suspect her pain is musculoskeletal in nature as it is reproducible on exam.  She has significant medication management restraints due to already being prescribed multiple medications for chronic pain. We will try Valium and Toradol in the emergency department and hopefully will be able to discharge home the patient home with follow-up with pain management. I offered to prescribe short course of steroid to help with acute inflammation however the patient states she is unable to take steroids due to side effects. She did ultimately request a one-time dose of a steroid in the emergency department and then feels that she is comfortable being discharged home. I encouraged her to take her home medications as prescribed. The patient tolerated their visit well. I evaluated the patient. The physician was available for consultation as needed. The patient and / or the family were informed of the results of any tests, a time was given to answer questions, a plan was proposed and they agreed with plan. I am the Primary Clinician of Record. CLINICAL IMPRESSION:  1.  Chest wall pain      Results for orders placed or performed during the hospital encounter of 11/04/22   CBC with Auto Differential   Result Value Ref Range    WBC 11.1 (H) 4.0 - 11.0 K/uL    RBC 4.85 4.00 - 5.20 M/uL    Hemoglobin 13.6 12.0 - 16.0 g/dL    Hematocrit 39.9 36.0 - 48.0 %    MCV 82.4 80.0 - 100.0 fL    MCH 28.0 26.0 - 34.0 pg    MCHC 34.0 31.0 - 36.0 g/dL    RDW 13.8 12.4 - 15.4 %    Platelets 504 575 - 408 K/uL    MPV 8.2 5.0 - 10.5 fL    Neutrophils % 67.9 %    Lymphocytes % 22.1 %    Monocytes % 5.9 %    Eosinophils % 2.7 %    Basophils % 1.4 %    Neutrophils Absolute 7.6 1.7 - 7.7 K/uL    Lymphocytes Absolute 2.5 1.0 - 5.1 K/uL    Monocytes Absolute 0.7 0.0 - 1.3 K/uL    Eosinophils Absolute 0.3 0.0 - 0.6 K/uL    Basophils Absolute 0.2 0.0 - 0.2 K/uL   Comprehensive Metabolic Panel w/ Reflex to MG   Result Value Ref Range    Sodium 139 136 - 145 mmol/L    Potassium reflex Magnesium 3.8 3.5 - 5.1 mmol/L    Chloride 100 99 - 110 mmol/L    CO2 27 21 - 32 mmol/L    Anion Gap 12 3 - 16    Glucose 108 (H) 70 - 99 mg/dL    BUN 9 7 - 20 mg/dL    Creatinine 0.9 0.6 - 1.1 mg/dL    Est, Glom Filt Rate >60 >60    Calcium 9.7 8.3 - 10.6 mg/dL    Total Protein 7.2 6.4 - 8.2 g/dL    Albumin 4.3 3.4 - 5.0 g/dL    Albumin/Globulin Ratio 1.5 1.1 - 2.2    Total Bilirubin <0.2 0.0 - 1.0 mg/dL    Alkaline Phosphatase 93 40 - 129 U/L    ALT 10 10 - 40 U/L    AST 9 (L) 15 - 37 U/L   Troponin   Result Value Ref Range    Troponin <0.01 <0.01 ng/mL   EKG 12 Lead   Result Value Ref Range    Ventricular Rate 72 BPM    Atrial Rate 72 BPM    P-R Interval 160 ms    QRS Duration 88 ms    Q-T Interval 378 ms    QTc Calculation (Bazett) 413 ms    P Axis 34 degrees    R Axis -12 degrees    T Axis 29 degrees    Diagnosis       Normal sinus rhythmModerate voltage criteria for LVH, may be normal variantBorderline ECGConfirmed by Kelley Lowery MD, [facility] ([id number]) on 11/4/2022 11:24:49 AM       I estimate there is LOW risk for PULMONARY EMBOLISM, ACUTE CORONARY SYNDROME, OR THORACIC AORTIC DISSECTION, thus I consider the discharge disposition reasonable. Jovi Brown and I have discussed the diagnosis and risks, and we agree with discharging home to follow-up with their primary doctor. We also discussed returning to the Emergency Department immediately if new or worsening symptoms occur. We have discussed the symptoms which are most concerning (e.g., bloody sputum, fever, worsening pain or shortness of breath, vomiting) that necessitate immediate return. FINAL Impression    1. Chest wall pain        Blood pressure 112/62, pulse 65, temperature 98.7 °F (37.1 °C), temperature source Oral, resp. rate 16, height 5' 5\" (1.651 m), weight 240 lb (108.9 kg), last menstrual period 07/31/2011, SpO2 100 %, not currently breastfeeding.     DISPOSITION Decision To Discharge 11/04/2022 12:02:25 PM      PATIENT REFERRED TO:  [facility]  ED  [address] [facility] [address] [id number]  [phone]  Go to   If symptoms worsen    [address] [facility]  [address] [address]  [phone]          DISCHARGE MEDICATIONS:  New Prescriptions    No medications on file       DISCONTINUED MEDICATIONS:  Discontinued Medications    No medications on file              (Please note the MDM and HPI sections of this note were completed with a voice recognition program.  Efforts were made to edit the dictations but occasionally words are mis-transcribed.)    Electronically signed, Leighton Rouse,           Leighton Rouse  11/04/22 1209

## 2022-11-04 NOTE — ED NOTES
Pt asking to be placed on O2. . told pt she was 100% on room air. Pink Levi  pt placed on 2 litters for comfort     Nick Whitney RN  11/04/22 6601

## 2023-09-03 ENCOUNTER — APPOINTMENT (OUTPATIENT)
Dept: GENERAL RADIOLOGY | Age: 48
End: 2023-09-03
Payer: COMMERCIAL

## 2023-09-03 ENCOUNTER — HOSPITAL ENCOUNTER (EMERGENCY)
Age: 48
Discharge: HOME | End: 2023-09-03
Payer: COMMERCIAL

## 2023-09-03 VITALS
RESPIRATION RATE: 16 BRPM | HEART RATE: 71 BPM | DIASTOLIC BLOOD PRESSURE: 73 MMHG | TEMPERATURE: 98.2 F | OXYGEN SATURATION: 94 % | WEIGHT: 260 LBS | BODY MASS INDEX: 43.27 KG/M2 | SYSTOLIC BLOOD PRESSURE: 101 MMHG

## 2023-09-03 DIAGNOSIS — M54.32 LEFT SCIATIC NERVE PAIN: Primary | ICD-10-CM

## 2023-09-03 PROCEDURE — 6370000000 HC RX 637 (ALT 250 FOR IP): Performed by: PHYSICIAN ASSISTANT

## 2023-09-03 PROCEDURE — 96372 THER/PROPH/DIAG INJ SC/IM: CPT

## 2023-09-03 PROCEDURE — 99284 EMERGENCY DEPT VISIT MOD MDM: CPT

## 2023-09-03 PROCEDURE — 6360000002 HC RX W HCPCS: Performed by: PHYSICIAN ASSISTANT

## 2023-09-03 PROCEDURE — 73502 X-RAY EXAM HIP UNI 2-3 VIEWS: CPT

## 2023-09-03 RX ORDER — KETOROLAC TROMETHAMINE 10 MG/1
10 TABLET, FILM COATED ORAL ONCE
Status: COMPLETED | OUTPATIENT
Start: 2023-09-03 | End: 2023-09-03

## 2023-09-03 RX ORDER — MORPHINE SULFATE 4 MG/ML
4 INJECTION, SOLUTION INTRAMUSCULAR; INTRAVENOUS ONCE
Status: COMPLETED | OUTPATIENT
Start: 2023-09-03 | End: 2023-09-03

## 2023-09-03 RX ORDER — METHOCARBAMOL 750 MG/1
750 TABLET, FILM COATED ORAL ONCE
Status: COMPLETED | OUTPATIENT
Start: 2023-09-03 | End: 2023-09-03

## 2023-09-03 RX ORDER — PREDNISONE 20 MG/1
60 TABLET ORAL ONCE
Status: COMPLETED | OUTPATIENT
Start: 2023-09-03 | End: 2023-09-03

## 2023-09-03 RX ORDER — PREDNISONE 20 MG/1
60 TABLET ORAL DAILY
Qty: 15 TABLET | Refills: 0 | Status: SHIPPED | OUTPATIENT
Start: 2023-09-03 | End: 2023-09-08

## 2023-09-03 RX ADMIN — PREDNISONE 60 MG: 20 TABLET ORAL at 18:05

## 2023-09-03 RX ADMIN — MORPHINE SULFATE 4 MG: 4 INJECTION, SOLUTION INTRAMUSCULAR; INTRAVENOUS at 18:05

## 2023-09-03 RX ADMIN — METHOCARBAMOL 750 MG: 750 TABLET ORAL at 18:05

## 2023-09-03 RX ADMIN — KETOROLAC TROMETHAMINE 10 MG: 10 TABLET, FILM COATED ORAL at 18:05

## 2023-09-03 ASSESSMENT — PAIN DESCRIPTION - ORIENTATION
ORIENTATION: LEFT
ORIENTATION: LEFT

## 2023-09-03 ASSESSMENT — PAIN DESCRIPTION - LOCATION
LOCATION: HIP
LOCATION: HIP

## 2023-09-03 ASSESSMENT — PAIN - FUNCTIONAL ASSESSMENT: PAIN_FUNCTIONAL_ASSESSMENT: 0-10

## 2023-09-03 ASSESSMENT — PAIN SCALES - GENERAL
PAINLEVEL_OUTOF10: 4
PAINLEVEL_OUTOF10: 10
PAINLEVEL_OUTOF10: 10

## 2023-09-03 ASSESSMENT — PAIN DESCRIPTION - DESCRIPTORS: DESCRIPTORS: ACHING

## 2023-09-12 ENCOUNTER — HOSPITAL ENCOUNTER (OUTPATIENT)
Dept: CT IMAGING | Age: 48
Discharge: HOME | End: 2023-09-12
Payer: COMMERCIAL

## 2023-09-12 ENCOUNTER — HOSPITAL ENCOUNTER (OUTPATIENT)
Dept: CARDIOLOGY | Age: 48
Discharge: HOME | End: 2023-09-12
Payer: COMMERCIAL

## 2023-09-12 DIAGNOSIS — F17.210 CIGARETTE SMOKER: ICD-10-CM

## 2023-09-12 DIAGNOSIS — Z12.2 SCREENING FOR MALIGNANT NEOPLASM OF RESPIRATORY ORGAN: ICD-10-CM

## 2023-09-12 PROCEDURE — 71271 CT THORAX LUNG CANCER SCR C-: CPT

## 2024-02-14 ENCOUNTER — HOSPITAL ENCOUNTER (OUTPATIENT)
Dept: ULTRASOUND IMAGING | Age: 49
Discharge: HOME | End: 2024-02-14
Payer: COMMERCIAL

## 2024-02-14 ENCOUNTER — HOSPITAL ENCOUNTER (OUTPATIENT)
Dept: WOMENS IMAGING | Age: 49
Discharge: HOME | End: 2024-02-14
Payer: COMMERCIAL

## 2024-02-14 VITALS — HEIGHT: 65 IN | BODY MASS INDEX: 45.82 KG/M2 | WEIGHT: 275 LBS

## 2024-02-14 DIAGNOSIS — Z12.31 VISIT FOR SCREENING MAMMOGRAM: ICD-10-CM

## 2024-02-14 DIAGNOSIS — E04.9 ENLARGEMENT OF THYROID: ICD-10-CM

## 2024-02-14 PROCEDURE — 76536 US EXAM OF HEAD AND NECK: CPT

## 2024-02-14 PROCEDURE — 77063 BREAST TOMOSYNTHESIS BI: CPT

## 2024-02-21 DIAGNOSIS — K52.9 CHRONIC DIARRHEA: Primary | ICD-10-CM

## 2024-02-27 ENCOUNTER — HOSPITAL ENCOUNTER (EMERGENCY)
Age: 49
Discharge: HOME | End: 2024-02-27
Attending: STUDENT IN AN ORGANIZED HEALTH CARE EDUCATION/TRAINING PROGRAM
Payer: COMMERCIAL

## 2024-02-27 ENCOUNTER — APPOINTMENT (OUTPATIENT)
Dept: CT IMAGING | Age: 49
End: 2024-02-27
Payer: COMMERCIAL

## 2024-02-27 VITALS
HEIGHT: 65 IN | WEIGHT: 275 LBS | TEMPERATURE: 98 F | DIASTOLIC BLOOD PRESSURE: 78 MMHG | BODY MASS INDEX: 45.82 KG/M2 | HEART RATE: 88 BPM | OXYGEN SATURATION: 96 % | RESPIRATION RATE: 18 BRPM | SYSTOLIC BLOOD PRESSURE: 120 MMHG

## 2024-02-27 DIAGNOSIS — R10.13 ABDOMINAL PAIN, EPIGASTRIC: Primary | ICD-10-CM

## 2024-02-27 LAB
ALBUMIN SERPL-MCNC: 4.4 G/DL (ref 3.4–5)
ALBUMIN/GLOB SERPL: 1.6 {RATIO} (ref 1.1–2.2)
ALP SERPL-CCNC: 74 U/L (ref 40–129)
ALT SERPL-CCNC: 12 U/L (ref 10–40)
ANION GAP SERPL CALCULATED.3IONS-SCNC: 10 MMOL/L (ref 3–16)
AST SERPL-CCNC: 12 U/L (ref 15–37)
BASOPHILS # BLD: 0.1 K/UL (ref 0–0.2)
BASOPHILS NFR BLD: 0.6 %
BILIRUB SERPL-MCNC: 0.4 MG/DL (ref 0–1)
BUN SERPL-MCNC: 7 MG/DL (ref 7–20)
CALCIUM SERPL-MCNC: 9.4 MG/DL (ref 8.3–10.6)
CHLORIDE SERPL-SCNC: 100 MMOL/L (ref 99–110)
CO2 SERPL-SCNC: 24 MMOL/L (ref 21–32)
CREAT SERPL-MCNC: 0.9 MG/DL (ref 0.6–1.1)
DEPRECATED RDW RBC AUTO: 15 % (ref 12.4–15.4)
EKG ATRIAL RATE: 91 BPM
EKG DIAGNOSIS: NORMAL
EKG P AXIS: 50 DEGREES
EKG P-R INTERVAL: 148 MS
EKG Q-T INTERVAL: 354 MS
EKG QRS DURATION: 80 MS
EKG QTC CALCULATION (BAZETT): 435 MS
EKG R AXIS: -8 DEGREES
EKG T AXIS: 26 DEGREES
EKG VENTRICULAR RATE: 91 BPM
EOSINOPHIL # BLD: 0.2 K/UL (ref 0–0.6)
EOSINOPHIL NFR BLD: 3 %
GFR SERPLBLD CREATININE-BSD FMLA CKD-EPI: >60 ML/MIN/{1.73_M2}
GLUCOSE SERPL-MCNC: 110 MG/DL (ref 70–99)
HCT VFR BLD AUTO: 43.5 % (ref 36–48)
HGB BLD-MCNC: 14.8 G/DL (ref 12–16)
INR PPP: 1.04 (ref 0.84–1.16)
LIPASE SERPL-CCNC: 41 U/L (ref 13–60)
LYMPHOCYTES # BLD: 2.9 K/UL (ref 1–5.1)
LYMPHOCYTES NFR BLD: 34.3 %
MCH RBC QN AUTO: 27.5 PG (ref 26–34)
MCHC RBC AUTO-ENTMCNC: 34.1 G/DL (ref 31–36)
MCV RBC AUTO: 80.7 FL (ref 80–100)
MONOCYTES # BLD: 0.6 K/UL (ref 0–1.3)
MONOCYTES NFR BLD: 6.8 %
NEUTROPHILS # BLD: 4.6 K/UL (ref 1.7–7.7)
NEUTROPHILS NFR BLD: 55.3 %
PLATELET # BLD AUTO: 364 K/UL (ref 135–450)
PMV BLD AUTO: 8.3 FL (ref 5–10.5)
POTASSIUM SERPL-SCNC: 4.2 MMOL/L (ref 3.5–5.1)
PROT SERPL-MCNC: 7.2 G/DL (ref 6.4–8.2)
PROTHROMBIN TIME: 13.6 SEC (ref 11.5–14.8)
RBC # BLD AUTO: 5.39 M/UL (ref 4–5.2)
SODIUM SERPL-SCNC: 134 MMOL/L (ref 136–145)
TROPONIN, HIGH SENSITIVITY: 8 NG/L (ref 0–14)
TROPONIN, HIGH SENSITIVITY: 8 NG/L (ref 0–14)
WBC # BLD AUTO: 8.4 K/UL (ref 4–11)

## 2024-02-27 PROCEDURE — 85025 COMPLETE CBC W/AUTO DIFF WBC: CPT

## 2024-02-27 PROCEDURE — 93005 ELECTROCARDIOGRAM TRACING: CPT | Performed by: STUDENT IN AN ORGANIZED HEALTH CARE EDUCATION/TRAINING PROGRAM

## 2024-02-27 PROCEDURE — 6370000000 HC RX 637 (ALT 250 FOR IP): Performed by: STUDENT IN AN ORGANIZED HEALTH CARE EDUCATION/TRAINING PROGRAM

## 2024-02-27 PROCEDURE — 71260 CT THORAX DX C+: CPT

## 2024-02-27 PROCEDURE — 6360000004 HC RX CONTRAST MEDICATION: Performed by: STUDENT IN AN ORGANIZED HEALTH CARE EDUCATION/TRAINING PROGRAM

## 2024-02-27 PROCEDURE — 80053 COMPREHEN METABOLIC PANEL: CPT

## 2024-02-27 PROCEDURE — 84484 ASSAY OF TROPONIN QUANT: CPT

## 2024-02-27 PROCEDURE — 83690 ASSAY OF LIPASE: CPT

## 2024-02-27 PROCEDURE — 93010 ELECTROCARDIOGRAM REPORT: CPT | Performed by: INTERNAL MEDICINE

## 2024-02-27 PROCEDURE — 36415 COLL VENOUS BLD VENIPUNCTURE: CPT

## 2024-02-27 PROCEDURE — 85610 PROTHROMBIN TIME: CPT

## 2024-02-27 PROCEDURE — 99285 EMERGENCY DEPT VISIT HI MDM: CPT

## 2024-02-27 RX ADMIN — ALUMINUM HYDROXIDE, MAGNESIUM HYDROXIDE, AND SIMETHICONE: 200; 200; 20 SUSPENSION ORAL at 16:30

## 2024-02-27 RX ADMIN — IOPAMIDOL 75 ML: 755 INJECTION, SOLUTION INTRAVENOUS at 15:46

## 2024-02-27 ASSESSMENT — PAIN SCALES - GENERAL: PAINLEVEL_OUTOF10: 10

## 2024-02-27 ASSESSMENT — PAIN DESCRIPTION - DESCRIPTORS: DESCRIPTORS: BURNING

## 2024-02-27 ASSESSMENT — PAIN DESCRIPTION - ORIENTATION: ORIENTATION: RIGHT;LEFT

## 2024-02-27 ASSESSMENT — PAIN DESCRIPTION - LOCATION: LOCATION: ABDOMEN;THROAT

## 2024-02-27 ASSESSMENT — PAIN - FUNCTIONAL ASSESSMENT: PAIN_FUNCTIONAL_ASSESSMENT: 0-10

## 2024-02-27 NOTE — ED PROVIDER NOTES
Continue tacrolimus and prednisone 10 mg daily.  Will monitor daily tacrolimus levels and adjust dose as needed. Received pulse steroids x 3 days through 4/25 with minimal improvement.   created using Dragon dictation software.  Efforts were made by me to ensure accuracy, however some errors may be present due to limitations of this technology and occasionally words are not transcribed correctly.      Will Iniguez,   02/27/24 1714

## 2024-02-27 NOTE — DISCHARGE INSTRUCTIONS
Return to nearest ED if you have severe worsening pain, nausea and vomiting, or other concerning symptoms.  As we discussed you can take your home medications as needed.  Follow-up with your GI doctor, call their office tomorrow.

## 2024-03-14 ENCOUNTER — HOSPITAL ENCOUNTER (OUTPATIENT)
Age: 49
Setting detail: SPECIMEN
Discharge: HOME | End: 2024-03-14
Payer: COMMERCIAL

## 2024-03-14 DIAGNOSIS — K52.9 CHRONIC DIARRHEA: ICD-10-CM

## 2024-03-14 LAB — C DIFF TOX A+B STL QL IA: NORMAL

## 2024-03-14 PROCEDURE — 87449 NOS EACH ORGANISM AG IA: CPT

## 2024-03-14 PROCEDURE — 82784 ASSAY IGA/IGD/IGG/IGM EACH: CPT

## 2024-03-14 PROCEDURE — 82653 EL-1 FECAL QUANTITATIVE: CPT

## 2024-03-14 PROCEDURE — 87506 IADNA-DNA/RNA PROBE TQ 6-11: CPT

## 2024-03-14 PROCEDURE — 87328 CRYPTOSPORIDIUM AG IA: CPT

## 2024-03-14 PROCEDURE — 83993 ASSAY FOR CALPROTECTIN FECAL: CPT

## 2024-03-14 PROCEDURE — 36415 COLL VENOUS BLD VENIPUNCTURE: CPT

## 2024-03-14 PROCEDURE — 87324 CLOSTRIDIUM AG IA: CPT

## 2024-03-14 PROCEDURE — 83516 IMMUNOASSAY NONANTIBODY: CPT

## 2024-03-15 LAB
CRYPTOSP AG STL QL IA: NORMAL
G LAMBLIA AG STL QL IA: NORMAL
GI PATHOGENS PNL STL NAA+PROBE: NORMAL
IGA SERPL-MCNC: 127 MG/DL (ref 70–400)
TISSUE TRANSGLUTAMINASE IGA: <0.5 U/ML (ref 0–14)

## 2024-03-16 LAB — CALPROTECTIN STL-MCNT: 6 UG/G

## 2024-03-17 LAB — ELASTASE PANC STL-MCNT: >800 UG/G

## 2024-03-22 ENCOUNTER — HOSPITAL ENCOUNTER (OUTPATIENT)
Dept: CARDIOLOGY | Age: 49
Discharge: HOME | End: 2024-03-22

## 2024-04-05 NOTE — PROGRESS NOTES
Ángela M Dayanarmaleer    Age 48 y.o.    female    1975    MRN [id number]    4/15/2024  Arrival Time_____________  OR Time____________30 Min     Procedure(s):  COLONOSCOPY         **LATEX SENSITIVE**                      General    Surgeon(s):  Audrey, Hardeep, MD       Phone [phone] (home)     [facility]  Date  Info Source  Home  Cell         Work  _____________________________________________________________________  _____________________________________________________________________  _____________________________________________________________________  _____________________________________________________________________  _____________________________________________________________________    PCP _____________________________ Phone_________________     H&P  ________________  Bringing      Chart              Epic      DOS      Called________  EKG ________________   Bringing      Chart              Epic      DOS      Called________  LABS________________   Bringing     Chart              Epic      DOS      Called________  Cardiac Clearance ______ Bringing      Chart              Epic      DOS      Called________  Pulmonary Clearance____ Bringing      Chart              Epic      DOS      Called________    Cardiologist________________________ Phone___________________________  Pulmonologist_______________________Phone___________________________    ? Advance Directives   ? Oriental orthodox concerns / Waiver on Chart            PAT Communications________________  ? Pre-op Instructions Given /Understood          _________________________________  ? Directions to Surgery Center                          _________________________________  ? Transportation Home_______________      __________________________________  ? Crutches/Walker__________________        __________________________________    Orders: Hard copy/ EPIC                 Transcribed/ EPIC              _______Wt.    ________Pharmacy

## 2024-04-09 RX ORDER — MONTELUKAST SODIUM 4 MG/1
1 TABLET, CHEWABLE ORAL 2 TIMES DAILY
COMMUNITY
Start: 2024-02-21

## 2024-04-09 RX ORDER — PROPRANOLOL HYDROCHLORIDE 80 MG/1
120 TABLET ORAL NIGHTLY
COMMUNITY

## 2024-04-09 NOTE — PROGRESS NOTES
Date and time of surgery : 4/9/24             Arrival Time:  1100     Bring Picture ID and insurance card.  Please wear simple, loose fitting clothing to the hospital.   Do not bring valuables (money, credit cards, checkbooks, etc.)   Do not wear any makeup (including  eye makeup) and no nail polish or artificial nails on your fingers or toes.  DO NOT wear any jewelry or piercings on day of surgery.  All body piercing jewelry must be removed.  If you have dentures, they will be removed before going to the OR; we will provide you a container.  If you wear contact lenses or glasses, they will be removed; please bring a case for them.  Shower the evening before or morning of surgery with antibacterial soap.  Nothing to eat or drink after midnight the day before surgery.   You may brush your teeth and gargle the morning of surgery.  DO NOT SWALLOW WATER.   Do not take any morning meds the day of your surgery.  Aspirin, Ibuprofen, Advil, Naproxen, Vitamin E and other Anti-inflammatory products and supplements should be stopped for 5 -7days before surgery or as directed by your physician.  Do not smoke or drink any alcoholic beverages 24 hours prior to surgery.  This includes NA Beer. Refrain from the usage of any recreational drugs, including non-prescribed prescription drugs.   You MUST plan for a responsible adult to stay on site while you are here and take you home after your surgery. You will not be allowed to leave alone or drive yourself home. It is strongly suggested someone stay with you the first 24 hrs. Your surgery will be cancelled if you do not have a ride home.  To help prevent infection, change your sheets the night before surgery.   If you  have a Living Will and Durable Power of  for Healthcare, please bring in a copy.  Notify your Surgeon if you develop any illness between now and time of surgery. Cough, cold, fever, sore throat, nausea, vomiting, etc.  Please notify your surgeon if you

## 2024-04-12 RX ORDER — MIDAZOLAM HYDROCHLORIDE 1 MG/ML
2 INJECTION INTRAMUSCULAR; INTRAVENOUS
Status: CANCELLED | OUTPATIENT
Start: 2024-04-12 | End: 2024-04-13

## 2024-04-12 RX ORDER — SODIUM CHLORIDE 0.9 % (FLUSH) 0.9 %
5-40 SYRINGE (ML) INJECTION EVERY 12 HOURS SCHEDULED
Status: CANCELLED | OUTPATIENT
Start: 2024-04-12

## 2024-04-12 RX ORDER — SODIUM CHLORIDE, SODIUM LACTATE, POTASSIUM CHLORIDE, CALCIUM CHLORIDE 600; 310; 30; 20 MG/100ML; MG/100ML; MG/100ML; MG/100ML
INJECTION, SOLUTION INTRAVENOUS CONTINUOUS
Status: CANCELLED | OUTPATIENT
Start: 2024-04-12

## 2024-04-12 RX ORDER — SODIUM CHLORIDE 9 MG/ML
INJECTION, SOLUTION INTRAVENOUS PRN
Status: CANCELLED | OUTPATIENT
Start: 2024-04-12

## 2024-04-12 RX ORDER — SODIUM CHLORIDE 0.9 % (FLUSH) 0.9 %
5-40 SYRINGE (ML) INJECTION PRN
Status: CANCELLED | OUTPATIENT
Start: 2024-04-12

## 2024-04-12 NOTE — H&P
Pseudephedrine plus [chlorpheniramine-pseudoeph], Sudafed [pseudoephedrine hcl], Sulfa antibiotics, Ultram [tramadol], Influenza vaccines, Nylon, and Vancomycin    Social History:   Social History     Socioeconomic History    Marital status:      Spouse name: Not on file    Number of children: Not on file    Years of education: Not on file    Highest education level: Not on file   Occupational History    Not on file   Tobacco Use    Smoking status: Every Day     Current packs/day: 0.75     Average packs/day: 0.8 packs/day for 35.0 years (26.3 ttl pk-yrs)     Types: Cigarettes    Smokeless tobacco: Never    Tobacco comments:     Per provider order for CT Lung Screening 9/2023   Vaping Use    Vaping Use: Never used   Substance and Sexual Activity    Alcohol use: No    Drug use: No    Sexual activity: Never   Other Topics Concern    Not on file   Social History Narrative    ** Merged History Encounter **          Social Determinants of Health     Financial Resource Strain: Not on file   Food Insecurity: Not on file   Transportation Needs: Not on file   Physical Activity: Not on file   Stress: Not on file   Social Connections: Not on file   Intimate Partner Violence: Not on file   Housing Stability: Not on file      Family History:       Problem Relation Age of Onset    High Blood Pressure Mother     Elevated Lipids Mother     Diabetes Mother     Other Mother         Melanoma    Depression Mother     Anxiety Disorder Mother     Diabetes Father     Heart Disease Father     Anxiety Disorder Father     Depression Father     Other Father         PTSD    Cancer Father         Throat     Breast Cancer Paternal Cousin 45    Breast Cancer Paternal Cousin 65        PHYSICAL EXAM:      Ht 1.651 m (5' 5\")   Wt 122.5 kg (270 lb)   LMP 07/31/2011   BMI 44.93 kg/m²  I        Heart:  Normal apical impulse, regular rate and rhythm, normal S1 and S2, no S3 or S4, and no murmur noted    Lungs:  No increased work of breathing,

## 2024-04-15 ENCOUNTER — ANESTHESIA (OUTPATIENT)
Dept: ENDOSCOPY | Age: 49
End: 2024-04-15
Payer: COMMERCIAL

## 2024-04-15 ENCOUNTER — ANESTHESIA EVENT (OUTPATIENT)
Dept: ENDOSCOPY | Age: 49
End: 2024-04-15
Payer: COMMERCIAL

## 2024-04-15 ENCOUNTER — HOSPITAL ENCOUNTER (OUTPATIENT)
Age: 49
Setting detail: OUTPATIENT SURGERY
Discharge: HOME | End: 2024-04-15
Attending: INTERNAL MEDICINE | Admitting: INTERNAL MEDICINE
Payer: COMMERCIAL

## 2024-04-15 VITALS
HEIGHT: 65 IN | OXYGEN SATURATION: 96 % | WEIGHT: 270 LBS | TEMPERATURE: 98.6 F | BODY MASS INDEX: 44.98 KG/M2 | DIASTOLIC BLOOD PRESSURE: 96 MMHG | SYSTOLIC BLOOD PRESSURE: 138 MMHG | RESPIRATION RATE: 18 BRPM | HEART RATE: 66 BPM

## 2024-04-15 DIAGNOSIS — K62.5 RECTAL BLEEDING: ICD-10-CM

## 2024-04-15 DIAGNOSIS — K52.9 CHRONIC DIARRHEA: ICD-10-CM

## 2024-04-15 PROCEDURE — 7100000010 HC PHASE II RECOVERY - FIRST 15 MIN: Performed by: INTERNAL MEDICINE

## 2024-04-15 PROCEDURE — 3700000001 HC ADD 15 MINUTES (ANESTHESIA): Performed by: INTERNAL MEDICINE

## 2024-04-15 PROCEDURE — 3700000000 HC ANESTHESIA ATTENDED CARE: Performed by: INTERNAL MEDICINE

## 2024-04-15 PROCEDURE — 3609010600 HC COLONOSCOPY POLYPECTOMY SNARE/COLD BIOPSY: Performed by: INTERNAL MEDICINE

## 2024-04-15 PROCEDURE — 2500000003 HC RX 250 WO HCPCS: Performed by: NURSE ANESTHETIST, CERTIFIED REGISTERED

## 2024-04-15 PROCEDURE — 2709999900 HC NON-CHARGEABLE SUPPLY: Performed by: INTERNAL MEDICINE

## 2024-04-15 PROCEDURE — 7100000011 HC PHASE II RECOVERY - ADDTL 15 MIN: Performed by: INTERNAL MEDICINE

## 2024-04-15 PROCEDURE — 2500000003 HC RX 250 WO HCPCS: Performed by: ANESTHESIOLOGY

## 2024-04-15 PROCEDURE — 3609010300 HC COLONOSCOPY W/BIOPSY SINGLE/MULTIPLE: Performed by: INTERNAL MEDICINE

## 2024-04-15 PROCEDURE — 88305 TISSUE EXAM BY PATHOLOGIST: CPT

## 2024-04-15 PROCEDURE — 6360000002 HC RX W HCPCS: Performed by: NURSE ANESTHETIST, CERTIFIED REGISTERED

## 2024-04-15 PROCEDURE — 6360000002 HC RX W HCPCS: Performed by: ANESTHESIOLOGY

## 2024-04-15 PROCEDURE — 2580000003 HC RX 258: Performed by: NURSE ANESTHETIST, CERTIFIED REGISTERED

## 2024-04-15 RX ORDER — FAMOTIDINE 10 MG/ML
20 INJECTION, SOLUTION INTRAVENOUS ONCE
Status: COMPLETED | OUTPATIENT
Start: 2024-04-15 | End: 2024-04-15

## 2024-04-15 RX ORDER — SODIUM CHLORIDE 9 MG/ML
INJECTION, SOLUTION INTRAVENOUS PRN
Status: DISCONTINUED | OUTPATIENT
Start: 2024-04-15 | End: 2024-04-15 | Stop reason: HOSPADM

## 2024-04-15 RX ORDER — NALOXONE HYDROCHLORIDE 0.4 MG/ML
INJECTION, SOLUTION INTRAMUSCULAR; INTRAVENOUS; SUBCUTANEOUS PRN
Status: DISCONTINUED | OUTPATIENT
Start: 2024-04-15 | End: 2024-04-15 | Stop reason: HOSPADM

## 2024-04-15 RX ORDER — CYCLOBENZAPRINE HCL 10 MG
10 TABLET ORAL 3 TIMES DAILY PRN
COMMUNITY

## 2024-04-15 RX ORDER — OXYCODONE HYDROCHLORIDE 5 MG/1
5 TABLET ORAL PRN
Status: DISCONTINUED | OUTPATIENT
Start: 2024-04-15 | End: 2024-04-15 | Stop reason: HOSPADM

## 2024-04-15 RX ORDER — SODIUM CHLORIDE 0.9 % (FLUSH) 0.9 %
5-40 SYRINGE (ML) INJECTION EVERY 12 HOURS SCHEDULED
Status: DISCONTINUED | OUTPATIENT
Start: 2024-04-15 | End: 2024-04-15 | Stop reason: HOSPADM

## 2024-04-15 RX ORDER — ONDANSETRON 2 MG/ML
4 INJECTION INTRAMUSCULAR; INTRAVENOUS EVERY 30 MIN PRN
Status: DISCONTINUED | OUTPATIENT
Start: 2024-04-15 | End: 2024-04-15 | Stop reason: HOSPADM

## 2024-04-15 RX ORDER — LEVOTHYROXINE SODIUM 0.07 MG/1
75 TABLET ORAL DAILY
COMMUNITY

## 2024-04-15 RX ORDER — LIDOCAINE HYDROCHLORIDE 20 MG/ML
INJECTION, SOLUTION EPIDURAL; INFILTRATION; INTRACAUDAL; PERINEURAL PRN
Status: DISCONTINUED | OUTPATIENT
Start: 2024-04-15 | End: 2024-04-15 | Stop reason: SDUPTHER

## 2024-04-15 RX ORDER — HYDROXYZINE 50 MG/1
50 TABLET, FILM COATED ORAL 3 TIMES DAILY PRN
COMMUNITY

## 2024-04-15 RX ORDER — PROPOFOL 10 MG/ML
INJECTION, EMULSION INTRAVENOUS PRN
Status: DISCONTINUED | OUTPATIENT
Start: 2024-04-15 | End: 2024-04-15 | Stop reason: SDUPTHER

## 2024-04-15 RX ORDER — OXYCODONE HYDROCHLORIDE 5 MG/1
10 TABLET ORAL PRN
Status: DISCONTINUED | OUTPATIENT
Start: 2024-04-15 | End: 2024-04-15 | Stop reason: HOSPADM

## 2024-04-15 RX ORDER — HYDROCORTISONE ACETATE 25 MG/1
25 SUPPOSITORY RECTAL 2 TIMES DAILY PRN
Qty: 1 SUPPOSITORY | Refills: 2 | Status: SHIPPED | OUTPATIENT
Start: 2024-04-15 | End: 2024-05-15

## 2024-04-15 RX ORDER — SODIUM CHLORIDE, SODIUM LACTATE, POTASSIUM CHLORIDE, CALCIUM CHLORIDE 600; 310; 30; 20 MG/100ML; MG/100ML; MG/100ML; MG/100ML
INJECTION, SOLUTION INTRAVENOUS CONTINUOUS PRN
Status: DISCONTINUED | OUTPATIENT
Start: 2024-04-15 | End: 2024-04-15 | Stop reason: SDUPTHER

## 2024-04-15 RX ORDER — SODIUM CHLORIDE 0.9 % (FLUSH) 0.9 %
5-40 SYRINGE (ML) INJECTION PRN
Status: DISCONTINUED | OUTPATIENT
Start: 2024-04-15 | End: 2024-04-15 | Stop reason: HOSPADM

## 2024-04-15 RX ADMIN — LIDOCAINE HYDROCHLORIDE 60 MG: 20 INJECTION, SOLUTION EPIDURAL; INFILTRATION; INTRACAUDAL; PERINEURAL at 12:16

## 2024-04-15 RX ADMIN — PROPOFOL 50 MG: 10 INJECTION, EMULSION INTRAVENOUS at 12:20

## 2024-04-15 RX ADMIN — PROPOFOL 50 MG: 10 INJECTION, EMULSION INTRAVENOUS at 12:18

## 2024-04-15 RX ADMIN — PROPOFOL 50 MG: 10 INJECTION, EMULSION INTRAVENOUS at 12:26

## 2024-04-15 RX ADMIN — PROPOFOL 50 MG: 10 INJECTION, EMULSION INTRAVENOUS at 12:33

## 2024-04-15 RX ADMIN — PROPOFOL 50 MG: 10 INJECTION, EMULSION INTRAVENOUS at 12:24

## 2024-04-15 RX ADMIN — PROPOFOL 50 MG: 10 INJECTION, EMULSION INTRAVENOUS at 12:21

## 2024-04-15 RX ADMIN — PROPOFOL 50 MG: 10 INJECTION, EMULSION INTRAVENOUS at 12:29

## 2024-04-15 RX ADMIN — PROPOFOL 150 MG: 10 INJECTION, EMULSION INTRAVENOUS at 12:16

## 2024-04-15 RX ADMIN — HYDROMORPHONE HYDROCHLORIDE 0.25 MG: 1 INJECTION, SOLUTION INTRAMUSCULAR; INTRAVENOUS; SUBCUTANEOUS at 12:51

## 2024-04-15 RX ADMIN — PROPOFOL 50 MG: 10 INJECTION, EMULSION INTRAVENOUS at 12:31

## 2024-04-15 RX ADMIN — SODIUM CHLORIDE, SODIUM LACTATE, POTASSIUM CHLORIDE, AND CALCIUM CHLORIDE: .6; .31; .03; .02 INJECTION, SOLUTION INTRAVENOUS at 12:03

## 2024-04-15 RX ADMIN — FAMOTIDINE 20 MG: 10 INJECTION, SOLUTION INTRAVENOUS at 11:35

## 2024-04-15 ASSESSMENT — PAIN SCALES - GENERAL
PAINLEVEL_OUTOF10: 4
PAINLEVEL_OUTOF10: 3
PAINLEVEL_OUTOF10: 5
PAINLEVEL_OUTOF10: 0

## 2024-04-15 ASSESSMENT — PAIN DESCRIPTION - LOCATION
LOCATION: BUTTOCKS

## 2024-04-15 ASSESSMENT — PAIN - FUNCTIONAL ASSESSMENT
PAIN_FUNCTIONAL_ASSESSMENT: ACTIVITIES ARE NOT PREVENTED
PAIN_FUNCTIONAL_ASSESSMENT: 0-10
PAIN_FUNCTIONAL_ASSESSMENT: ACTIVITIES ARE NOT PREVENTED
PAIN_FUNCTIONAL_ASSESSMENT: ACTIVITIES ARE NOT PREVENTED

## 2024-04-15 ASSESSMENT — PAIN DESCRIPTION - DESCRIPTORS
DESCRIPTORS: BURNING

## 2024-04-15 ASSESSMENT — COPD QUESTIONNAIRES: CAT_SEVERITY: MILD

## 2024-04-15 NOTE — ANESTHESIA PRE PROCEDURE
Department of Anesthesiology  Preprocedure Note       Name:  Ann M Buergermeier   Age:  48 y.o.  :  1975                                          MRN:  [id number]         Date:  4/15/2024      Surgeon: Surgeon(s):  Hardeep Ventura MD    Procedure: Procedure(s):  COLONOSCOPY         **LATEX SENSITIVE**    Medications prior to admission:   Prior to Admission medications    Medication Sig Start Date End Date Taking? Authorizing Provider   NONFORMULARY Dupixant  SQ   Yes Graham Maddox MD   Risankizumab-rzaa (SKYRIZI, 150 MG DOSE, SC) Inject into the skin Once every 3 months   Yes Graham Maddox MD   levothyroxine (SYNTHROID) 75 MCG tablet Take 1 tablet by mouth Daily   Yes Graham Maddox MD   hydrOXYzine HCl (ATARAX) 50 MG tablet Take 1 tablet by mouth 3 times daily as needed for Itching   Yes Graham Maddox MD   cyclobenzaprine (FLEXERIL) 10 MG tablet Take 1 tablet by mouth 3 times daily as needed for Muscle spasms   Yes Graham Maddox MD   L-METHYLFOLATE PO Take by mouth   Yes Graham Maddox MD   colestipol (COLESTID) 1 g tablet Take 1 tablet by mouth 2 times daily 24  Yes Graham Maddox MD   propranolol (INDERAL) 80 MG tablet Take 1.5 tablets by mouth nightly    Graham Maddox MD   pantoprazole (PROTONIX) 40 MG tablet Take 1 tablet by mouth daily    Graham Maddox MD   methocarbamol (ROBAXIN) 500 MG tablet Take 1,000 mg by mouth 3 times daily as needed  Patient not taking: Reported on 4/15/2024    Graham Maddox MD   ezetimibe (ZETIA) 10 MG tablet Take 1 tablet by mouth daily 21   Graham Maddox MD   diphenoxylate-atropine (LOMOTIL) 2.5-0.025 MG per tablet Take 1 tablet by mouth 2 times daily. 21   Graham Maddox MD   mirtazapine (REMERON) 15 MG tablet Take 15 mg by mouth nightly   Patient not taking: Reported on 4/15/2024    Graham Maddox MD   famotidine (PEPCID) 40 MG tablet Take 40 mg by mouth 
MD Graham   scopolamine (TRANSDERM-SCOP) transdermal patch Place 1 patch onto the skin every 72 hours  Patient taking differently: Place 1 patch onto the skin every 72 hours as needed 7/18/16   Vargas Wagoner MD       Current medications:    No current facility-administered medications for this encounter.       Allergies:    Allergies   Allergen Reactions   • Latex      Rash and blisters   • Clindamycin Rash     c-diff   • Serzone [Nefazodone] Itching   • Butrans [Buprenorphine] Hives, Diarrhea and Rash   • Adhesive Tape      Rash and blisters   • Aripiprazole      Too much vertigo    • Avelox [Moxifloxacin Hcl In Nacl]    • Flagyl [Metronidazole]      Worsened c diff   • Indocin [Indometacin Sodium]    • Indomethacin Hives   • Latuda [Lurasidone Hcl]      Personality changes.   • Moxifloxacin      Gave c dif   • Neurontin [Gabapentin] Other (See Comments)     \"felt psychotic\"  Mood changes   • Other      Sutures from knee scope caused severe itching   • Pseudephedrine Plus [Chlorpheniramine-Pseudoeph]    • Sudafed [Pseudoephedrine Hcl]    • Sulfa Antibiotics    • Ultram [Tramadol]      Can take percocet , morphine    • Influenza Vaccines    • Nylon Rash   • Vancomycin Rash       Problem List:    Patient Active Problem List   Diagnosis Code   • Depression with anxiety F41.8   • Hypothyroidism E03.9   • Vitamin D deficiency E55.9   • IBS (irritable bowel syndrome) K58.9   • Arthralgia M25.50   • Morbid obesity with BMI of 40.0-44.9, adult ([facility]) E66.01, Z68.41   • Chronic GERD K21.9   • Neuroma digital nerve G58.8   • Mitchell's neuroma of right foot G57.61   • Hiatal hernia K44.9   • Acute pancreatitis K85.90   • 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

## 2024-04-15 NOTE — DISCHARGE INSTRUCTIONS
account, please click on the \"Sign Up Now\" link.  Current as of: May 12, 2017  Content Version: 11.6  © 1712-5544 [employer]. Care instructions adapted under license by [email or web address]. If you have questions about a medical condition or this instruction, always ask your healthcare professional. [employer] disclaims any warranty or liability for your use of this information.

## 2024-04-17 NOTE — ANESTHESIA POSTPROCEDURE EVALUATION
Department of Anesthesiology  Postprocedure Note    Patient: Ann M Buergermeier  MRN: [id number]  YOB: 1975  Date of evaluation: 4/17/2024    Procedure Summary       Date: 04/15/24 Room / Location: [place] [address] / [facility]    Anesthesia Start: 1207 Anesthesia Stop: 1238    Procedures:       COLONOSCOPY BIOPSY      COLONOSCOPY POLYPECTOMY SNARE/COLD BIOPSY Diagnosis:       Rectal bleeding      Chronic diarrhea      (Rectal bleeding [K62.5])      (Chronic diarrhea [K52.9])    Surgeons: Hardeep Ventura MD Responsible Provider: Kee Strauss MD    Anesthesia Type: MAC ASA Status: 3            Anesthesia Type: No value filed.    Enmanuel Phase I: Enmanuel Score: 10    Enmanuel Phase II: Enmanuel Score: 10    Anesthesia Post Evaluation    Patient location during evaluation: PACU  Level of consciousness: awake  Airway patency: patent  Nausea & Vomiting: no vomiting  Cardiovascular status: blood pressure returned to baseline  Respiratory status: acceptable  Hydration status: stable  Pain management: adequate    No notable events documented.

## 2024-04-25 ENCOUNTER — HOSPITAL ENCOUNTER (OUTPATIENT)
Dept: CARDIOLOGY | Age: 49
Discharge: HOME | End: 2024-04-25
Payer: COMMERCIAL

## 2024-04-25 PROCEDURE — 78452 HT MUSCLE IMAGE SPECT MULT: CPT

## 2024-04-25 PROCEDURE — 3430000000 HC RX DIAGNOSTIC RADIOPHARMACEUTICAL: Performed by: FAMILY MEDICINE

## 2024-04-25 PROCEDURE — 93017 CV STRESS TEST TRACING ONLY: CPT

## 2024-04-25 PROCEDURE — A9502 TC99M TETROFOSMIN: HCPCS | Performed by: FAMILY MEDICINE

## 2024-04-25 PROCEDURE — 6360000002 HC RX W HCPCS: Performed by: FAMILY MEDICINE

## 2024-04-25 RX ORDER — REGADENOSON 0.08 MG/ML
0.4 INJECTION, SOLUTION INTRAVENOUS
Status: COMPLETED | OUTPATIENT
Start: 2024-04-25 | End: 2024-04-25

## 2024-04-25 RX ADMIN — TETROFOSMIN 33.6 MILLICURIE: 1.38 INJECTION, POWDER, LYOPHILIZED, FOR SOLUTION INTRAVENOUS at 12:45

## 2024-04-25 RX ADMIN — REGADENOSON 0.4 MG: 0.08 INJECTION, SOLUTION INTRAVENOUS at 12:45

## 2024-04-26 ENCOUNTER — HOSPITAL ENCOUNTER (OUTPATIENT)
Dept: CARDIOLOGY | Age: 49
Discharge: HOME | End: 2024-04-26
Payer: COMMERCIAL

## 2024-04-26 PROCEDURE — 3430000000 HC RX DIAGNOSTIC RADIOPHARMACEUTICAL: Performed by: FAMILY MEDICINE

## 2024-04-26 PROCEDURE — A9502 TC99M TETROFOSMIN: HCPCS | Performed by: FAMILY MEDICINE

## 2024-04-26 RX ADMIN — TETROFOSMIN 31 MILLICURIE: 1.38 INJECTION, POWDER, LYOPHILIZED, FOR SOLUTION INTRAVENOUS at 12:49

## 2024-04-30 ENCOUNTER — HOSPITAL ENCOUNTER (EMERGENCY)
Age: 49
Discharge: HOME | End: 2024-04-30
Payer: COMMERCIAL

## 2024-04-30 ENCOUNTER — APPOINTMENT (OUTPATIENT)
Dept: GENERAL RADIOLOGY | Age: 49
End: 2024-04-30
Payer: COMMERCIAL

## 2024-04-30 VITALS
OXYGEN SATURATION: 97 % | SYSTOLIC BLOOD PRESSURE: 131 MMHG | TEMPERATURE: 98 F | BODY MASS INDEX: 44.98 KG/M2 | RESPIRATION RATE: 20 BRPM | WEIGHT: 270 LBS | HEART RATE: 72 BPM | HEIGHT: 65 IN | DIASTOLIC BLOOD PRESSURE: 87 MMHG

## 2024-04-30 DIAGNOSIS — R07.89 CHEST WALL PAIN: Primary | ICD-10-CM

## 2024-04-30 PROCEDURE — 6370000000 HC RX 637 (ALT 250 FOR IP): Performed by: PHYSICIAN ASSISTANT

## 2024-04-30 PROCEDURE — 99284 EMERGENCY DEPT VISIT MOD MDM: CPT

## 2024-04-30 PROCEDURE — 96372 THER/PROPH/DIAG INJ SC/IM: CPT

## 2024-04-30 PROCEDURE — 71046 X-RAY EXAM CHEST 2 VIEWS: CPT

## 2024-04-30 PROCEDURE — 6360000002 HC RX W HCPCS: Performed by: PHYSICIAN ASSISTANT

## 2024-04-30 RX ORDER — ACETAMINOPHEN 325 MG/1
650 TABLET ORAL ONCE
Status: COMPLETED | OUTPATIENT
Start: 2024-04-30 | End: 2024-04-30

## 2024-04-30 RX ORDER — KETOROLAC TROMETHAMINE 30 MG/ML
30 INJECTION, SOLUTION INTRAMUSCULAR; INTRAVENOUS ONCE
Status: COMPLETED | OUTPATIENT
Start: 2024-04-30 | End: 2024-04-30

## 2024-04-30 RX ADMIN — KETOROLAC TROMETHAMINE 30 MG: 30 INJECTION, SOLUTION INTRAMUSCULAR; INTRAVENOUS at 10:36

## 2024-04-30 RX ADMIN — ACETAMINOPHEN 650 MG: 325 TABLET ORAL at 09:55

## 2024-04-30 ASSESSMENT — PAIN SCALES - GENERAL
PAINLEVEL_OUTOF10: 10

## 2024-04-30 ASSESSMENT — PAIN DESCRIPTION - ORIENTATION: ORIENTATION: LEFT

## 2024-04-30 ASSESSMENT — PAIN DESCRIPTION - LOCATION: LOCATION: RIB CAGE

## 2024-04-30 ASSESSMENT — PAIN DESCRIPTION - PAIN TYPE: TYPE: ACUTE PAIN

## 2024-04-30 ASSESSMENT — PAIN - FUNCTIONAL ASSESSMENT: PAIN_FUNCTIONAL_ASSESSMENT: 0-10

## 2024-04-30 NOTE — ED PROVIDER NOTES
Normal ROM.   CARDIOVASCULAR: RRR. Intact distal pulses.  PULMONARY/CHEST WALL: Effort normal. No tachypnea. Lungs clear to ausculation.  Tenderness to palpate the lower aspect of the left lateral chest wall.  No crepitus to palpate.  No overlying bruising or other skin change.  ABDOMEN: Soft. Nondistended. No tenderness to palpate.   /ANORECTAL: Not assessed  MUSKULOSKELETAL: Normal ROM. No acute deformities. No edema. No tenderness to palpate.  SKIN: Warm and dry. No rash.  NEUROLOGICAL: Alert and oriented x 3. GCS 15. CN II-XII grossly intact. Strength is 5/5 in all extremities and sensation is intact. Normal gait.   PSYCHIATRIC: Normal affect      DIAGNOSTIC RESULTS:       RADIOLOGY:  All x-ray studies are viewed/reviewed by me. My interpretation: No acute cardiopulmonary abnormality.    Formal interpretations per the radiologist are as follows:      XR CHEST (2 VW)    Result Date: 4/30/2024  EXAMINATION: TWO XRAY VIEWS OF THE CHEST 4/30/2024 9:54 am COMPARISON: 4 November 2022 HISTORY: ORDERING SYSTEM PROVIDED HISTORY: left chest wall pain from injury TECHNOLOGIST PROVIDED HISTORY: Reason for exam:->left chest wall pain from injury Reason for Exam: left chest wall pain from injury FINDINGS: Lungs: Well inflated. Normal bronchovascular markings. Heart and mediastinum: Heart is normal in size. Trachea is midline.Daryc are symmetrical, no mass. Osseous structures: Unremarkable Abdomen: Nonspecific bowel gas pattern.     No acute cardiopulmonary disease.  No change, compared to the prior study.           PROCEDURES:   N/A      CRITICAL CARE TIME:     None        EMERGENCY DEPARTMENT COURSE and DIFFERENTIAL DIAGNOSIS/MDM:   Vitals:    Vitals:    04/30/24 0944   BP: 131/87   Pulse: 72   Resp: 20   Temp: 98 °F (36.7 °C)   TempSrc: Oral   SpO2: 97%   Weight: 122.5 kg (270 lb)   Height: 1.651 m (5' 5\")       Patient was given the following medications:  Medications   acetaminophen (TYLENOL) tablet 650 mg (650 mg Oral

## 2024-09-27 DIAGNOSIS — R19.7 DIARRHEA, UNSPECIFIED TYPE: Primary | ICD-10-CM

## 2025-01-10 DIAGNOSIS — K58.0 IRRITABLE BOWEL SYNDROME WITH DIARRHEA: Primary | ICD-10-CM

## 2025-01-10 RX ORDER — DIPHENOXYLATE HYDROCHLORIDE AND ATROPINE SULFATE 2.5; .025 MG/1; MG/1
1 TABLET ORAL 4 TIMES DAILY PRN
Qty: 120 TABLET | Refills: 0 | Status: SHIPPED | OUTPATIENT
Start: 2025-01-10 | End: 2025-02-09

## 2025-02-19 ENCOUNTER — HOSPITAL ENCOUNTER (OUTPATIENT)
Dept: WOMENS IMAGING | Age: 50
Discharge: HOME | End: 2025-02-19
Payer: COMMERCIAL

## 2025-02-19 VITALS — BODY MASS INDEX: 48.32 KG/M2 | WEIGHT: 290 LBS | HEIGHT: 65 IN

## 2025-02-19 DIAGNOSIS — Z12.31 OTHER SCREENING MAMMOGRAM: ICD-10-CM

## 2025-02-19 PROCEDURE — 77063 BREAST TOMOSYNTHESIS BI: CPT

## 2025-02-25 ENCOUNTER — HOSPITAL ENCOUNTER (EMERGENCY)
Age: 50
Discharge: HOME | End: 2025-02-25
Payer: COMMERCIAL

## 2025-02-25 ENCOUNTER — APPOINTMENT (OUTPATIENT)
Dept: GENERAL RADIOLOGY | Age: 50
End: 2025-02-25
Payer: COMMERCIAL

## 2025-02-25 VITALS
RESPIRATION RATE: 18 BRPM | HEART RATE: 90 BPM | DIASTOLIC BLOOD PRESSURE: 67 MMHG | WEIGHT: 274 LBS | HEIGHT: 65 IN | TEMPERATURE: 100.1 F | SYSTOLIC BLOOD PRESSURE: 144 MMHG | BODY MASS INDEX: 45.65 KG/M2 | OXYGEN SATURATION: 95 %

## 2025-02-25 DIAGNOSIS — J10.1 INFLUENZA A: Primary | ICD-10-CM

## 2025-02-25 LAB
ALBUMIN SERPL-MCNC: 3.9 G/DL (ref 3.4–5)
ALBUMIN/GLOB SERPL: 1.6 {RATIO} (ref 1.1–2.2)
ALP SERPL-CCNC: 75 U/L (ref 40–129)
ALT SERPL-CCNC: 14 U/L (ref 10–40)
ANION GAP SERPL CALCULATED.3IONS-SCNC: 12 MMOL/L (ref 3–16)
AST SERPL-CCNC: 16 U/L (ref 15–37)
BASOPHILS # BLD: 0 K/UL (ref 0–0.2)
BASOPHILS NFR BLD: 0 %
BILIRUB SERPL-MCNC: 0.6 MG/DL (ref 0–1)
BUN SERPL-MCNC: 11 MG/DL (ref 7–20)
CALCIUM SERPL-MCNC: 9.2 MG/DL (ref 8.3–10.6)
CHLORIDE SERPL-SCNC: 100 MMOL/L (ref 99–110)
CO2 SERPL-SCNC: 26 MMOL/L (ref 21–32)
CREAT SERPL-MCNC: 0.8 MG/DL (ref 0.6–1.1)
DEPRECATED RDW RBC AUTO: 14.4 % (ref 12.4–15.4)
EKG ATRIAL RATE: 87 BPM
EKG DIAGNOSIS: NORMAL
EKG P AXIS: 32 DEGREES
EKG P-R INTERVAL: 138 MS
EKG Q-T INTERVAL: 350 MS
EKG QRS DURATION: 92 MS
EKG QTC CALCULATION (BAZETT): 421 MS
EKG R AXIS: -4 DEGREES
EKG T AXIS: 35 DEGREES
EKG VENTRICULAR RATE: 87 BPM
EOSINOPHIL # BLD: 0.1 K/UL (ref 0–0.6)
EOSINOPHIL NFR BLD: 1 %
FLUAV RNA RESP QL NAA+PROBE: DETECTED
FLUBV RNA RESP QL NAA+PROBE: NOT DETECTED
GFR SERPLBLD CREATININE-BSD FMLA CKD-EPI: 89 ML/MIN/{1.73_M2}
GLUCOSE SERPL-MCNC: 100 MG/DL (ref 70–99)
HCT VFR BLD AUTO: 36.9 % (ref 36–48)
HGB BLD-MCNC: 12.7 G/DL (ref 12–16)
LYMPHOCYTES # BLD: 0.4 K/UL (ref 1–5.1)
LYMPHOCYTES NFR BLD: 5 %
MACROCYTES BLD QL SMEAR: ABNORMAL
MCH RBC QN AUTO: 29 PG (ref 26–34)
MCHC RBC AUTO-ENTMCNC: 34.4 G/DL (ref 31–36)
MCV RBC AUTO: 84.3 FL (ref 80–100)
MICROCYTES BLD QL SMEAR: ABNORMAL
MONOCYTES # BLD: 0.2 K/UL (ref 0–1.3)
MONOCYTES NFR BLD: 2 %
NEUTROPHILS # BLD: 7 K/UL (ref 1.7–7.7)
NEUTROPHILS NFR BLD: 91 %
NEUTS BAND NFR BLD MANUAL: 1 % (ref 0–7)
PLATELET # BLD AUTO: 273 K/UL (ref 135–450)
PLATELET BLD QL SMEAR: ADEQUATE
PMV BLD AUTO: 8.5 FL (ref 5–10.5)
POTASSIUM SERPL-SCNC: 3.9 MMOL/L (ref 3.5–5.1)
PROT SERPL-MCNC: 6.3 G/DL (ref 6.4–8.2)
RBC # BLD AUTO: 4.37 M/UL (ref 4–5.2)
S PYO AG THROAT QL: NEGATIVE
SARS-COV-2 RNA RESP QL NAA+PROBE: NOT DETECTED
SLIDE REVIEW: ABNORMAL
SODIUM SERPL-SCNC: 138 MMOL/L (ref 136–145)
TROPONIN, HIGH SENSITIVITY: 9 NG/L (ref 0–14)
WBC # BLD AUTO: 7.6 K/UL (ref 4–11)

## 2025-02-25 PROCEDURE — 87880 STREP A ASSAY W/OPTIC: CPT

## 2025-02-25 PROCEDURE — 80053 COMPREHEN METABOLIC PANEL: CPT

## 2025-02-25 PROCEDURE — 6370000000 HC RX 637 (ALT 250 FOR IP)

## 2025-02-25 PROCEDURE — 93010 ELECTROCARDIOGRAM REPORT: CPT | Performed by: INTERNAL MEDICINE

## 2025-02-25 PROCEDURE — 87636 SARSCOV2 & INF A&B AMP PRB: CPT

## 2025-02-25 PROCEDURE — 84484 ASSAY OF TROPONIN QUANT: CPT

## 2025-02-25 PROCEDURE — 2580000003 HC RX 258

## 2025-02-25 PROCEDURE — 93005 ELECTROCARDIOGRAM TRACING: CPT

## 2025-02-25 PROCEDURE — 96374 THER/PROPH/DIAG INJ IV PUSH: CPT

## 2025-02-25 PROCEDURE — 71045 X-RAY EXAM CHEST 1 VIEW: CPT

## 2025-02-25 PROCEDURE — 96375 TX/PRO/DX INJ NEW DRUG ADDON: CPT

## 2025-02-25 PROCEDURE — 6360000002 HC RX W HCPCS

## 2025-02-25 PROCEDURE — 85025 COMPLETE CBC W/AUTO DIFF WBC: CPT

## 2025-02-25 PROCEDURE — 99285 EMERGENCY DEPT VISIT HI MDM: CPT

## 2025-02-25 RX ORDER — DIPHENHYDRAMINE HYDROCHLORIDE 50 MG/ML
25 INJECTION INTRAMUSCULAR; INTRAVENOUS ONCE
Status: COMPLETED | OUTPATIENT
Start: 2025-02-25 | End: 2025-02-25

## 2025-02-25 RX ORDER — ACETAMINOPHEN 500 MG
1000 TABLET ORAL
Status: COMPLETED | OUTPATIENT
Start: 2025-02-25 | End: 2025-02-25

## 2025-02-25 RX ORDER — ONDANSETRON 4 MG/1
4 TABLET, ORALLY DISINTEGRATING ORAL 3 TIMES DAILY PRN
Qty: 21 TABLET | Refills: 0 | Status: SHIPPED | OUTPATIENT
Start: 2025-02-25

## 2025-02-25 RX ORDER — LIDOCAINE HYDROCHLORIDE 20 MG/ML
10 SOLUTION OROPHARYNGEAL
Qty: 100 ML | Refills: 0 | Status: SHIPPED | OUTPATIENT
Start: 2025-02-25

## 2025-02-25 RX ORDER — LIDOCAINE HYDROCHLORIDE 20 MG/ML
10 SOLUTION OROPHARYNGEAL ONCE
Status: COMPLETED | OUTPATIENT
Start: 2025-02-25 | End: 2025-02-25

## 2025-02-25 RX ORDER — METOCLOPRAMIDE HYDROCHLORIDE 5 MG/ML
10 INJECTION INTRAMUSCULAR; INTRAVENOUS ONCE
Status: COMPLETED | OUTPATIENT
Start: 2025-02-25 | End: 2025-02-25

## 2025-02-25 RX ORDER — BENZONATATE 200 MG/1
200 CAPSULE ORAL 3 TIMES DAILY PRN
Qty: 30 CAPSULE | Refills: 0 | Status: SHIPPED | OUTPATIENT
Start: 2025-02-25 | End: 2025-03-07

## 2025-02-25 RX ORDER — 0.9 % SODIUM CHLORIDE 0.9 %
1000 INTRAVENOUS SOLUTION INTRAVENOUS ONCE
Status: COMPLETED | OUTPATIENT
Start: 2025-02-25 | End: 2025-02-25

## 2025-02-25 RX ADMIN — DIPHENHYDRAMINE HYDROCHLORIDE 25 MG: 50 INJECTION INTRAMUSCULAR; INTRAVENOUS at 13:19

## 2025-02-25 RX ADMIN — METOCLOPRAMIDE HYDROCHLORIDE 10 MG: 5 INJECTION INTRAMUSCULAR; INTRAVENOUS at 13:18

## 2025-02-25 RX ADMIN — LIDOCAINE HYDROCHLORIDE 10 ML: 20 SOLUTION ORAL at 14:51

## 2025-02-25 RX ADMIN — ACETAMINOPHEN 1000 MG: 500 TABLET ORAL at 13:18

## 2025-02-25 RX ADMIN — SODIUM CHLORIDE 1000 ML: 0.9 INJECTION, SOLUTION INTRAVENOUS at 13:20

## 2025-02-25 ASSESSMENT — PAIN DESCRIPTION - DESCRIPTORS: DESCRIPTORS: ACHING

## 2025-02-25 ASSESSMENT — PAIN SCALES - GENERAL: PAINLEVEL_OUTOF10: 10

## 2025-02-25 ASSESSMENT — PAIN DESCRIPTION - LOCATION: LOCATION: GENERALIZED

## 2025-02-25 ASSESSMENT — PAIN - FUNCTIONAL ASSESSMENT: PAIN_FUNCTIONAL_ASSESSMENT: 0-10

## 2025-02-25 NOTE — ED PROVIDER NOTES
The Ekg interpreted by me shows  normal sinus rhythm with a rate of 87  Axis is   32  QTc is  normal  Intervals and Durations are unremarkable.      ST Segments: nonspecific changes  No significant change from prior EKG dated 2/27/2024          Salvatore Peterson MD  02/25/25 8832    
and drink plenty of fluids.  Use acetaminophen as needed for fever, chills, body aches, and headache.  Patient states that she does have chronic pain meds and is advised to take as prescribed.  Patient advised to use Zofran as needed for nausea.  Viscous lidocaine as needed for throat pain.  Benzonatate as needed for coughing.  Follow-up closely with PCP for further evaluation and management.  Return to the ED for any new or worsening symptoms.  Patient understands and agrees with plan for discharge.  All questions answered. [JM]      ED Course User Index  [JM] Sagar Jc, SAEMUS       Is this patient to be included in the SEP-1 Core Measure due to severe sepsis or septic shock?   No   Exclusion criteria - the patient is NOT to be included for SEP-1 Core Measure due to:  Viral etiology found or highly suspected (including COVID-19) without concomitant bacterial infection    DDx:  Viral illness, COVID, flu, strep, bronchitis, pneumonia, sinusitis, pharyngitis, tonsillitis, otitis media, MAYCOL, dehydration, electrolyte abnormality, anemia, pneumothorax, PE, ACS, cardiac arrhythmia, and sepsis.    FINAL IMPRESSION  1. Influenza A      Patient referred to:  Megan Moya DO  [address] [id number] [phone]    In 1 week      [facility] Emergency Department  [address] [facility] [address] [phone]    If symptoms worsen      Discharge Medications:   Discharge Medication List as of 2/25/2025  2:46 PM        START taking these medications    Details   benzonatate (TESSALON) 200 MG capsule Take 1 capsule by mouth 3 times daily as needed for Cough, Disp-30 capsule, R-0Normal      lidocaine viscous hcl (XYLOCAINE) 2 % SOLN solution Take 10 mLs by mouth every 3 hours as needed for Irritation (Sore throat) Gargle and spit., Disp-100 mL, R-0Normal           Discontinued Medications:  Discharge Medication List as of 2/25/2025  2:46 PM        Risk management discussed and shared

## 2025-03-10 ENCOUNTER — TRANSCRIBE ORDERS (OUTPATIENT)
Dept: ADMINISTRATIVE | Age: 50
End: 2025-03-10

## 2025-03-10 DIAGNOSIS — R93.89 ABNORMAL CXR (CHEST X-RAY): ICD-10-CM

## 2025-03-10 DIAGNOSIS — I51.7 CARDIAC ENLARGEMENT: Primary | ICD-10-CM

## 2025-05-07 ENCOUNTER — HOSPITAL ENCOUNTER (OUTPATIENT)
Age: 50
Discharge: HOME | End: 2025-05-09
Payer: COMMERCIAL

## 2025-05-07 VITALS — SYSTOLIC BLOOD PRESSURE: 144 MMHG | DIASTOLIC BLOOD PRESSURE: 67 MMHG

## 2025-05-07 DIAGNOSIS — R93.89 ABNORMAL CXR (CHEST X-RAY): ICD-10-CM

## 2025-05-07 DIAGNOSIS — I51.7 CARDIAC ENLARGEMENT: ICD-10-CM

## 2025-05-07 LAB
ECHO AO ASC DIAM: 3 CM
ECHO AO ROOT DIAM: 3 CM
ECHO AV AREA PEAK VELOCITY: 1.9 CM2
ECHO AV AREA VTI: 2 CM2
ECHO AV MEAN GRADIENT: 6 MMHG
ECHO AV MEAN VELOCITY: 1.1 M/S
ECHO AV PEAK GRADIENT: 10 MMHG
ECHO AV PEAK VELOCITY: 1.6 M/S
ECHO AV VELOCITY RATIO: 0.75
ECHO AV VTI: 25.5 CM
ECHO EST RA PRESSURE: 3 MMHG
ECHO IVC PROX: 1.3 CM
ECHO LA AREA 2C: 15.6 CM2
ECHO LA AREA 4C: 17.3 CM2
ECHO LA DIAMETER: 2.7 CM
ECHO LA MAJOR AXIS: 5.2 CM
ECHO LA MINOR AXIS: 4.8 CM
ECHO LA TO AORTIC ROOT RATIO: 0.9
ECHO LA VOL BP: 44 ML (ref 22–52)
ECHO LA VOL MOD A2C: 40 ML (ref 22–52)
ECHO LA VOL MOD A4C: 46 ML (ref 22–52)
ECHO LV E' LATERAL VELOCITY: 12.7 CM/S
ECHO LV E' SEPTAL VELOCITY: 11.5 CM/S
ECHO LV EDV A2C: 88 ML
ECHO LV EDV A4C: 94 ML
ECHO LV EF PHYSICIAN: 58 %
ECHO LV EJECTION FRACTION A2C: 62 %
ECHO LV EJECTION FRACTION A4C: 56 %
ECHO LV EJECTION FRACTION BIPLANE: 56 % (ref 55–100)
ECHO LV ESV A2C: 33 ML
ECHO LV ESV A4C: 42 ML
ECHO LV FRACTIONAL SHORTENING: 29 % (ref 28–44)
ECHO LV INTERNAL DIMENSION DIASTOLIC: 4.9 CM (ref 3.9–5.3)
ECHO LV INTERNAL DIMENSION SYSTOLIC: 3.5 CM
ECHO LV IVSD: 1 CM (ref 0.6–0.9)
ECHO LV MASS 2D: 188.1 G (ref 67–162)
ECHO LV POSTERIOR WALL DIASTOLIC: 1.1 CM (ref 0.6–0.9)
ECHO LV RELATIVE WALL THICKNESS RATIO: 0.45
ECHO LVOT AREA: 2.5 CM2
ECHO LVOT AV VTI INDEX: 0.77
ECHO LVOT DIAM: 1.8 CM
ECHO LVOT MEAN GRADIENT: 3 MMHG
ECHO LVOT PEAK GRADIENT: 6 MMHG
ECHO LVOT PEAK VELOCITY: 1.2 M/S
ECHO LVOT SV: 50.1 ML
ECHO LVOT VTI: 19.7 CM
ECHO MV A VELOCITY: 0.84 M/S
ECHO MV E DECELERATION TIME (DT): 195 MS
ECHO MV E VELOCITY: 0.6 M/S
ECHO MV E/A RATIO: 0.71
ECHO MV E/E' LATERAL: 4.72
ECHO MV E/E' RATIO (AVERAGED): 4.97
ECHO MV E/E' SEPTAL: 5.22
ECHO PV MAX VELOCITY: 1.2 M/S
ECHO PV MEAN GRADIENT: 3 MMHG
ECHO PV MEAN VELOCITY: 0.9 M/S
ECHO PV PEAK GRADIENT: 6 MMHG
ECHO PV VTI: 17.2 CM
ECHO RA AREA 4C: 12 CM2
ECHO RA VOLUME: 27 ML
ECHO RV BASAL DIMENSION: 3.4 CM
ECHO RV FREE WALL PEAK S': 11.6 CM/S
ECHO RV LONGITUDINAL DIMENSION: 6.7 CM
ECHO RV MID DIMENSION: 2.7 CM
ECHO RV TAPSE: 2 CM (ref 1.7–?)

## 2025-05-07 PROCEDURE — 93306 TTE W/DOPPLER COMPLETE: CPT

## 2025-05-07 PROCEDURE — 93306 TTE W/DOPPLER COMPLETE: CPT | Performed by: INTERNAL MEDICINE

## 2025-05-07 PROCEDURE — 93356 MYOCRD STRAIN IMG SPCKL TRCK: CPT | Performed by: INTERNAL MEDICINE

## (undated) DEVICE — SOLUTION IV IRRIG 500ML 0.9% SODIUM CHL 2F7123

## (undated) DEVICE — CYSTO/BLADDER IRRIGATION SET, REGULATING CLAMP

## (undated) DEVICE — GLOVE ORANGE PI 8   MSG9080

## (undated) DEVICE — ELECTRODE,ECG,STRESS,FOAM,3PK: Brand: MEDLINE

## (undated) DEVICE — CONMED SCOPE SAVER BITE BLOCK, 20X27 MM: Brand: SCOPE SAVER

## (undated) DEVICE — GAUZE,SPONGE,4"X4",8PLY,STRL,LF,10/TRAY: Brand: MEDLINE

## (undated) DEVICE — DBD-PACK,CYSTOSCOPY,PK VI,AURORA: Brand: MEDLINE

## (undated) DEVICE — BAG URIN STRL FOR URO CTCH SYS

## (undated) DEVICE — Z DUP USE 2522782 SOLUTION IRRIG 1000ML STRL H2O PLAS CONTAINER UROMATIC

## (undated) DEVICE — BOWL MED L 32OZ PLAS W/ MOLD GRAD EZ OPN PEEL PCH

## (undated) DEVICE — FORCEPS BX L240CM JAW DIA2.8MM L CAP W/ NDL MIC MESH TOOTH

## (undated) DEVICE — Device

## (undated) DEVICE — PREP SOL PVP IODINE 4%  4 OZ/BTL

## (undated) DEVICE — MEDI-VAC NON-CONDUCTIVE SUCTION TUBING: Brand: CARDINAL HEALTH

## (undated) DEVICE — CANNULA NSL 13FT TUBE AD ETCO2 DIV SAMP M

## (undated) DEVICE — ENDOSCOPIC KIT 2 12 FT OP4 DE2 GWN SYR

## (undated) DEVICE — SNARE ENDOSCP L240CM SHTH DIA24MM LOOP W10MM POLYP RND REINF

## (undated) DEVICE — GOWN SIRUS NONREIN LG W/TWL: Brand: MEDLINE INDUSTRIES, INC.

## (undated) DEVICE — TRAP SPEC POLYPR SGL CHMBR FN MESH SCRN

## (undated) DEVICE — CANNULA NSL AD TBNG L7FT PVC STR NONFLARED PRNG O2 DEL W STD